# Patient Record
Sex: MALE | Race: BLACK OR AFRICAN AMERICAN
[De-identification: names, ages, dates, MRNs, and addresses within clinical notes are randomized per-mention and may not be internally consistent; named-entity substitution may affect disease eponyms.]

---

## 2018-02-06 ENCOUNTER — HOSPITAL ENCOUNTER (INPATIENT)
Dept: HOSPITAL 72 - EMR | Age: 63
LOS: 5 days | Discharge: NURSING, REHAB OR LONG-TERM CARE | DRG: 190 | End: 2018-02-11
Payer: MEDICARE

## 2018-02-06 VITALS — WEIGHT: 169 LBS | HEIGHT: 74 IN | BODY MASS INDEX: 21.69 KG/M2

## 2018-02-06 VITALS — DIASTOLIC BLOOD PRESSURE: 98 MMHG | SYSTOLIC BLOOD PRESSURE: 146 MMHG

## 2018-02-06 VITALS — DIASTOLIC BLOOD PRESSURE: 79 MMHG | SYSTOLIC BLOOD PRESSURE: 138 MMHG

## 2018-02-06 VITALS — DIASTOLIC BLOOD PRESSURE: 82 MMHG | SYSTOLIC BLOOD PRESSURE: 136 MMHG

## 2018-02-06 DIAGNOSIS — J44.1: ICD-10-CM

## 2018-02-06 DIAGNOSIS — J18.9: ICD-10-CM

## 2018-02-06 DIAGNOSIS — F17.200: ICD-10-CM

## 2018-02-06 DIAGNOSIS — J44.0: Primary | ICD-10-CM

## 2018-02-06 DIAGNOSIS — Z23: ICD-10-CM

## 2018-02-06 DIAGNOSIS — E87.6: ICD-10-CM

## 2018-02-06 LAB
ADD MANUAL DIFF: NO
ALBUMIN SERPL-MCNC: 4 G/DL (ref 3.4–5)
ALBUMIN/GLOB SERPL: 0.9 {RATIO} (ref 1–2.7)
ALP SERPL-CCNC: 81 U/L (ref 46–116)
ALT SERPL-CCNC: 23 U/L (ref 12–78)
ANION GAP SERPL CALC-SCNC: 9 MMOL/L (ref 5–15)
AST SERPL-CCNC: 24 U/L (ref 15–37)
BASOPHILS NFR BLD AUTO: 0.7 % (ref 0–2)
BILIRUB SERPL-MCNC: 0.7 MG/DL (ref 0.2–1)
BUN SERPL-MCNC: 8 MG/DL (ref 7–18)
CALCIUM SERPL-MCNC: 9 MG/DL (ref 8.5–10.1)
CHLORIDE SERPL-SCNC: 101 MMOL/L (ref 98–107)
CK MB SERPL-MCNC: 1.6 NG/ML (ref 0–3.6)
CK SERPL-CCNC: 265 U/L (ref 26–308)
CO2 SERPL-SCNC: 29 MMOL/L (ref 21–32)
CREAT SERPL-MCNC: 0.9 MG/DL (ref 0.55–1.3)
EOSINOPHIL NFR BLD AUTO: 0.1 % (ref 0–3)
ERYTHROCYTE [DISTWIDTH] IN BLOOD BY AUTOMATED COUNT: 11.8 % (ref 11.6–14.8)
GLOBULIN SER-MCNC: 4.3 G/DL
HCT VFR BLD CALC: 46.3 % (ref 42–52)
HGB BLD-MCNC: 15.9 G/DL (ref 14.2–18)
LYMPHOCYTES NFR BLD AUTO: 9.5 % (ref 20–45)
MCV RBC AUTO: 84 FL (ref 80–99)
MONOCYTES NFR BLD AUTO: 8.8 % (ref 1–10)
NEUTROPHILS NFR BLD AUTO: 80.9 % (ref 45–75)
PLATELET # BLD: 152 K/UL (ref 150–450)
POTASSIUM SERPL-SCNC: 3.8 MMOL/L (ref 3.5–5.1)
RBC # BLD AUTO: 5.48 M/UL (ref 4.7–6.1)
SODIUM SERPL-SCNC: 139 MMOL/L (ref 136–145)
WBC # BLD AUTO: 11 K/UL (ref 4.8–10.8)

## 2018-02-06 PROCEDURE — 84100 ASSAY OF PHOSPHORUS: CPT

## 2018-02-06 PROCEDURE — 82550 ASSAY OF CK (CPK): CPT

## 2018-02-06 PROCEDURE — 99285 EMERGENCY DEPT VISIT HI MDM: CPT

## 2018-02-06 PROCEDURE — 71045 X-RAY EXAM CHEST 1 VIEW: CPT

## 2018-02-06 PROCEDURE — 90732 PPSV23 VACC 2 YRS+ SUBQ/IM: CPT

## 2018-02-06 PROCEDURE — 94664 DEMO&/EVAL PT USE INHALER: CPT

## 2018-02-06 PROCEDURE — 80048 BASIC METABOLIC PNL TOTAL CA: CPT

## 2018-02-06 PROCEDURE — 84484 ASSAY OF TROPONIN QUANT: CPT

## 2018-02-06 PROCEDURE — 80053 COMPREHEN METABOLIC PANEL: CPT

## 2018-02-06 PROCEDURE — 87070 CULTURE OTHR SPECIMN AEROBIC: CPT

## 2018-02-06 PROCEDURE — 36415 COLL VENOUS BLD VENIPUNCTURE: CPT

## 2018-02-06 PROCEDURE — 83735 ASSAY OF MAGNESIUM: CPT

## 2018-02-06 PROCEDURE — 87040 BLOOD CULTURE FOR BACTERIA: CPT

## 2018-02-06 PROCEDURE — 85025 COMPLETE CBC W/AUTO DIFF WBC: CPT

## 2018-02-06 PROCEDURE — 85007 BL SMEAR W/DIFF WBC COUNT: CPT

## 2018-02-06 PROCEDURE — 94640 AIRWAY INHALATION TREATMENT: CPT

## 2018-02-06 PROCEDURE — 93005 ELECTROCARDIOGRAM TRACING: CPT

## 2018-02-06 PROCEDURE — 87205 SMEAR GRAM STAIN: CPT

## 2018-02-06 PROCEDURE — 82553 CREATINE MB FRACTION: CPT

## 2018-02-06 PROCEDURE — 83880 ASSAY OF NATRIURETIC PEPTIDE: CPT

## 2018-02-06 RX ADMIN — THEOPHYLLINE ANHYDROUS SCH MG: 100 CAPSULE, EXTENDED RELEASE ORAL at 21:48

## 2018-02-06 RX ADMIN — HEPARIN SODIUM SCH UNITS: 5000 INJECTION INTRAVENOUS; SUBCUTANEOUS at 21:49

## 2018-02-06 RX ADMIN — METHYLPREDNISOLONE SODIUM SUCCINATE SCH MG: 125 INJECTION, POWDER, FOR SOLUTION INTRAMUSCULAR; INTRAVENOUS at 23:44

## 2018-02-06 RX ADMIN — DEXTROSE MONOHYDRATE SCH MLS/HR: 50 INJECTION, SOLUTION INTRAVENOUS at 23:37

## 2018-02-06 NOTE — CONSULTATION
History of Present Illness


General


Date patient seen:  Feb 6, 2018


Chief Complaint:  Upper Respiratory Illness


Referring physician:  Dr. Uriarte


Reason for Consultation:  Shortness of breath suspected COPD excacerbation





Present Illness


HPI


The patient is a 62-year-old man with pmhx COPD who presents 


to [facility] with chief complaint of increased shortness of breath

, 


coughing and congestion.  THe patient admits that he has run out of his inhaler


medicine and has not seen his primary care doctor for extended period of time. 


Patient states he used to receive breathing treatments at home but has since


not been able to get his medicine due to insurance reasons and cost. The patient


is being admitted for treatment and I was asked to consult from pulmonary point


of view.


Allergies:  


Coded Allergies:  


     No Known Allergies (Unverified , 2/6/18)





Medication History


Scheduled


Fluticasone/Salmeterol (Advair 250-50 Diskus), 1 PUFF INH EVERY 12 HOURS, (

Reported)


Nicotine 14MG Patch* (Nicoderm Cq 14MG*), 1 EACH TD DAILY, (Reported)


Quetiapine Fumarate* (Seroquel*), 100 MG ORAL QHS, (Reported)


Theophylline (Theodur*), 100 MG ORAL TWICE A DAY, (Reported)


Trazodone Hcl* (Desyrel*), 50 MG ORAL BEDTIME, (Reported)





Scheduled PRN


Cyclobenzaprine Hcl* (Flexeril*), 10 MG ORAL QHS PRN for For Pain, (Reported)


Ibuprofen (Ibuprofen*), 800 MG ORAL THREE TIMES A DAY PRN for For Pain, (

Reported)


Ipratropium/Albuterol Sulfate (Combivent Respimat Inhal [place]), 1 PUFF IH for 

Bronchospasm, (Reported)





Patient History


Healthcare decision maker





Resuscitation status





Advanced Directive on File








Past Medical/Surgical History


Past Medical/Surgical History:  


(1) Acute exacerbation of chronic obstructive pulmonary disease (COPD)


(2) Pneumonia


(3) COPD exacerbation





Review of Systems


Constitutional:  Reports: malaise, weakness


Respiratory:  Reports: cough, shortness of breath, stridor, wheezing





Physical Exam


General Appearance:  severe distress


Lines, tubes and drains:  peripheral


HEENT:  normocephalic, atraumatic, anicteric, PERRL


Neck:  non-tender, normal alignment, supple, normal inspection


Respiratory/Chest:  chest wall non-tender, decreased breath sounds, expiratory 

wheezing, inspiratory wheezing, pleural rub


Breasts:  no masses


Cardiovascular/Chest:  regular rhythm, no JVD, tachycardia


Abdomen:  normal bowel sounds, non tender, soft, no organomegaly, no mass


Genitourinary/Rectal:  normal genital exam, normal rectal exam


Extremities:  normal range of motion, non-tender, normal inspection, no calf 

tenderness


Skin Exam:  normal pigmentation, warm/dry


Neurologic:  CNs II-XII grossly normal, no motor/sensory deficits





Last 24 Hour Vital Signs








  Date Time  Temp Pulse Resp B/P (MAP) Pulse Ox O2 Delivery O2 Flow Rate FiO2


 


2/6/18 17:32  92 18  99 Nasal Cannula 2.0 28


 


2/6/18 17:07  92 21   Nasal Cannula 2.0 28


 


2/6/18 17:03  92 18  96 Nasal Cannula 2.0 28


 


2/6/18 16:17 99.3  24 136/82 93 Room Air  


 


2/6/18 16:17  82 24   Nasal Cannula 2.0 93


 


2/6/18 15:29 99.3 82 24 136/82 93 Room Air  











Laboratory Tests








Test


  2/6/18


17:00


 


White Blood Count


  11.0 K/UL


(4.8-10.8)  H


 


Red Blood Count


  5.48 M/UL


(4.70-6.10)


 


Hemoglobin


  15.9 G/DL


(14.2-18.0)


 


Hematocrit


  46.3 %


(42.0-52.0)


 


Mean Corpuscular Volume 84 FL (80-99)  


 


Mean Corpuscular Hemoglobin


  29.0 PG


(27.0-31.0)


 


Mean Corpuscular Hemoglobin


Concent 34.3 G/DL


(32.0-36.0)


 


Red Cell Distribution Width


  11.8 %


(11.6-14.8)


 


Platelet Count


  152 K/UL


(150-450)


 


Mean Platelet Volume


  8.1 FL


(6.5-10.1)


 


Neutrophils (%) (Auto)


  80.9 %


(45.0-75.0)  H


 


Lymphocytes (%) (Auto)


  9.5 %


(20.0-45.0)  L


 


Monocytes (%) (Auto)


  8.8 %


(1.0-10.0)


 


Eosinophils (%) (Auto)


  0.1 %


(0.0-3.0)


 


Basophils (%) (Auto)


  0.7 %


(0.0-2.0)


 


Sodium Level


  139 MMOL/L


(136-145)


 


Potassium Level


  3.8 MMOL/L


(3.5-5.1)


 


Chloride Level


  101 MMOL/L


()


 


Carbon Dioxide Level


  29 MMOL/L


(21-32)


 


Anion Gap


  9 mmol/L


(5-15)


 


Blood Urea Nitrogen


  8 mg/dL (7-18)


 


 


Creatinine


  0.9 MG/DL


(0.55-1.30)


 


Estimat Glomerular Filtration


Rate > 60 mL/min


(>60)


 


Glucose Level


  85 MG/DL


()


 


Calcium Level


  9.0 MG/DL


(8.5-10.1)


 


Total Bilirubin


  0.7 MG/DL


(0.2-1.0)


 


Aspartate Amino Transf


(AST/SGOT) 24 U/L (15-37)


 


 


Alanine Aminotransferase


(ALT/SGPT) 23 U/L (12-78)


 


 


Alkaline Phosphatase


  81 U/L


()


 


Total Creatine Kinase


  265 U/L


()


 


Creatine Kinase MB


  1.6 NG/ML


(0.0-3.6)


 


Creatine Kinase MB Relative


Index 0.6  


 


 


Troponin I


  0.000 ng/mL


(0.000-0.056)


 


Pro-B-Type Natriuretic Peptide


  422 pg/mL


(0-125)  H


 


Total Protein


  8.3 G/DL


(6.4-8.2)  H


 


Albumin


  4.0 G/DL


(3.4-5.0)


 


Globulin 4.3 g/dL  


 


Albumin/Globulin Ratio


  0.9 (1.0-2.7)


L








Height (Feet):  6


Height (Inches):  2.00


Weight (Pounds):  169


Medications





Current Medications








 Medications


  (Trade)  Dose


 Ordered  Sig/Zana


 Route


 PRN Reason  Start Time


 Stop Time Status Last Admin


Dose Admin


 


 Levofloxacin  150 ml @ 


 100 mls/hr  NOW  ONCE


 IVPB


   2/6/18 18:15


 2/6/18 19:44  2/6/18 18:15


 











Assessment/Plan


Problem List:  


(1) Acute exacerbation of chronic obstructive pulmonary disease (COPD)


ICD Codes:  J44.1 - Chronic obstructive pulmonary disease with (acute) 

exacerbation


SNOMED:  901816373


Status:  stable, progressing


Assessment/Plan


Respiratory treatment


IV steroids


IV abx


Check cultures


DVT propylasix











NIALL PAT Feb 6, 2018 19:20

## 2018-02-06 NOTE — EMERGENCY ROOM REPORT
History of Present Illness


General


Chief Complaint:  Upper Respiratory Illness


Source:  Medical Record





Present Illness


HPI


Patient presents with complaints of cough and shortness of breath


Patient has history of COPD denies any vomiting or diarrhea


He feels that he had a cold earlier


And has not been worsening


Questionable low-grade fever


Denies any chest pain


Denies any calf pain or swelling denies any pleurisy





Patient had tried breathing treatment at home however not fully resolving his 

discomfort


Allergies:  


Coded Allergies:  


     No Known Allergies (Unverified , 2/6/18)





Patient History


Past Medical History:  see triage record


Pertinent Family History:  none


Reviewed Nursing Documentation:  PMH: Agreed, PSxH: Agreed





Nursing Documentation-PMH


Hx COPD:  Yes


History Of Psychiatric Problem:  Yes - Schizophrenia





Review of Systems


All Other Systems:  negative except mentioned in HPI





Physical Exam





Vital Signs








  Date Time  Temp Pulse Resp B/P (MAP) Pulse Ox O2 Delivery O2 Flow Rate FiO2


 


2/6/18 15:29 99.3 82 24 136/82 93 Room Air  


 


2/6/18 16:17       2.0 93








Sp02 EP Interpretation:  reviewed, normal


General Appearance:  mild distress - Appears short of breath


Head:  normocephalic, atraumatic


Eyes:  bilateral eye PERRL, bilateral eye EOMI


ENT:  hearing grossly normal, normal pharynx, TMs + canals normal, uvula midline


Neck:  full range of motion, supple, no meningismus, no bony tend


Respiratory:  no retraction, no accessory muscle use, wheezing - Patient has 

wheezing and crackles diffusely appears mildly tachypneic,


Cardiovascular #1:  normal peripheral pulses, regular rate, rhythm, no edema, 

no gallop, no JVD, no murmur


Gastrointestinal:  normal bowel sounds, non tender, soft, no mass, no 

organomegaly, non-distended, no guarding, no hernia, no pulsatile mass, no 

rebound


Genitourinary:  no CVA tenderness


Musculoskeletal:  normal inspection


Neurologic:  oriented x3, responsive, CNs III-XII nml as tested, motor strength/

tone normal, sensory intact


Psychiatric:  mood/affect normal


Skin:  normal color, no rash, warm/dry, palpation normal


Lymphatic:  normal inspection, no adenopathy





Medical Decision Making


Diagnostic Impression:  


 Primary Impression:  


 Pneumonia


 Additional Impression:  


 COPD exacerbation


ER Course


Patient is a fairly complex patient with multiple differential to consideration 

including but not limited to cardiac cardiopulmonary and vascular emergencies





Patient's x-ray shows some increased perihilar fullness consideration for 

pneumonia is also made





Patient COPD is addressed with reading treatments and steroids patient has done 

better however requiring repeat evaluation and further inpatient care





Labs








Test


  2/6/18


17:00


 


White Blood Count


  11.0 K/UL


(4.8-10.8)


 


Red Blood Count


  5.48 M/UL


(4.70-6.10)


 


Hemoglobin


  15.9 G/DL


(14.2-18.0)


 


Hematocrit


  46.3 %


(42.0-52.0)


 


Mean Corpuscular Volume 84 FL (80-99) 


 


Mean Corpuscular Hemoglobin


  29.0 PG


(27.0-31.0)


 


Mean Corpuscular Hemoglobin


Concent 34.3 G/DL


(32.0-36.0)


 


Red Cell Distribution Width


  11.8 %


(11.6-14.8)


 


Platelet Count


  152 K/UL


(150-450)


 


Mean Platelet Volume


  8.1 FL


(6.5-10.1)


 


Neutrophils (%) (Auto)


  80.9 %


(45.0-75.0)


 


Lymphocytes (%) (Auto)


  9.5 %


(20.0-45.0)


 


Monocytes (%) (Auto)


  8.8 %


(1.0-10.0)


 


Eosinophils (%) (Auto)


  0.1 %


(0.0-3.0)


 


Basophils (%) (Auto)


  0.7 %


(0.0-2.0)


 


Sodium Level


  139 MMOL/L


(136-145)


 


Potassium Level


  3.8 MMOL/L


(3.5-5.1)


 


Chloride Level


  101 MMOL/L


()


 


Carbon Dioxide Level


  29 MMOL/L


(21-32)


 


Anion Gap


  9 mmol/L


(5-15)


 


Blood Urea Nitrogen 8 mg/dL (7-18) 


 


Creatinine


  0.9 MG/DL


(0.55-1.30)


 


Estimat Glomerular Filtration


Rate > 60 mL/min


(>60)


 


Glucose Level


  85 MG/DL


()


 


Calcium Level


  9.0 MG/DL


(8.5-10.1)


 


Total Bilirubin


  0.7 MG/DL


(0.2-1.0)


 


Aspartate Amino Transf


(AST/SGOT) 24 U/L (15-37) 


 


 


Alanine Aminotransferase


(ALT/SGPT) 23 U/L (12-78) 


 


 


Alkaline Phosphatase


  81 U/L


()


 


Total Creatine Kinase


  265 U/L


()


 


Creatine Kinase MB


  1.6 NG/ML


(0.0-3.6)


 


Creatine Kinase MB Relative


Index 0.6 


 


 


Troponin I


  0.000 ng/mL


(0.000-0.056)


 


Pro-B-Type Natriuretic Peptide


  422 pg/mL


(0-125)


 


Total Protein


  8.3 G/DL


(6.4-8.2)


 


Albumin


  4.0 G/DL


(3.4-5.0)


 


Globulin 4.3 g/dL 


 


Albumin/Globulin Ratio 0.9 (1.0-2.7) 








Rhythm Strip Diag. Results


EP Interpretation:  yes


Rate:  77


Rhythm:  NSR, no PVC's, no ectopy





Chest X-Ray Diagnostic Results


Chest X-Ray Diagnostic Results :  


   Chest X-Ray Ordered:  Yes


   # of Views/Limited/Complete:  1 View


   Indication:  Shortness of Breath


   EP Interpretation:  Yes


   Interpretation:  no effusion, no pneumothorax, other - Mild increased 

perihilar fullness


   Impression:  Other - Parahilar fullness consider pneumonia


   Electronically Signed by:  Max Cortes DO





Last Vital Signs








  Date Time  Temp Pulse Resp B/P (MAP) Pulse Ox O2 Delivery O2 Flow Rate FiO2


 


2/6/18 17:32  92 18  99 Nasal Cannula 2.0 28


 


2/6/18 16:17 99.3   136/82    








Status:  improved


Disposition:  ADMITTED AS INPATIENT


Condition:  Serious


Referrals:  


NON PHYSICIAN (PCP)











MAX CORTES D.O. Feb 6, 2018 20:11

## 2018-02-07 VITALS — DIASTOLIC BLOOD PRESSURE: 90 MMHG | SYSTOLIC BLOOD PRESSURE: 140 MMHG

## 2018-02-07 VITALS — SYSTOLIC BLOOD PRESSURE: 156 MMHG | DIASTOLIC BLOOD PRESSURE: 93 MMHG

## 2018-02-07 VITALS — SYSTOLIC BLOOD PRESSURE: 123 MMHG | DIASTOLIC BLOOD PRESSURE: 74 MMHG

## 2018-02-07 VITALS — DIASTOLIC BLOOD PRESSURE: 80 MMHG | SYSTOLIC BLOOD PRESSURE: 137 MMHG

## 2018-02-07 VITALS — SYSTOLIC BLOOD PRESSURE: 112 MMHG | DIASTOLIC BLOOD PRESSURE: 78 MMHG

## 2018-02-07 VITALS — SYSTOLIC BLOOD PRESSURE: 143 MMHG | DIASTOLIC BLOOD PRESSURE: 86 MMHG

## 2018-02-07 RX ADMIN — METHYLPREDNISOLONE SODIUM SUCCINATE SCH MG: 125 INJECTION, POWDER, FOR SOLUTION INTRAMUSCULAR; INTRAVENOUS at 17:20

## 2018-02-07 RX ADMIN — DEXTROSE MONOHYDRATE SCH MLS/HR: 50 INJECTION, SOLUTION INTRAVENOUS at 05:10

## 2018-02-07 RX ADMIN — THEOPHYLLINE ANHYDROUS SCH MG: 100 CAPSULE, EXTENDED RELEASE ORAL at 08:49

## 2018-02-07 RX ADMIN — HEPARIN SODIUM SCH UNITS: 5000 INJECTION INTRAVENOUS; SUBCUTANEOUS at 08:49

## 2018-02-07 RX ADMIN — DEXTROSE MONOHYDRATE SCH MLS/HR: 50 INJECTION, SOLUTION INTRAVENOUS at 13:12

## 2018-02-07 RX ADMIN — METHYLPREDNISOLONE SODIUM SUCCINATE SCH MG: 125 INJECTION, POWDER, FOR SOLUTION INTRAMUSCULAR; INTRAVENOUS at 11:36

## 2018-02-07 RX ADMIN — THEOPHYLLINE ANHYDROUS SCH MG: 100 CAPSULE, EXTENDED RELEASE ORAL at 20:41

## 2018-02-07 RX ADMIN — DEXTROSE MONOHYDRATE SCH MLS/HR: 50 INJECTION, SOLUTION INTRAVENOUS at 20:42

## 2018-02-07 RX ADMIN — METHYLPREDNISOLONE SODIUM SUCCINATE SCH MG: 125 INJECTION, POWDER, FOR SOLUTION INTRAMUSCULAR; INTRAVENOUS at 05:10

## 2018-02-07 RX ADMIN — METHYLPREDNISOLONE SODIUM SUCCINATE SCH MG: 125 INJECTION, POWDER, FOR SOLUTION INTRAMUSCULAR; INTRAVENOUS at 22:59

## 2018-02-07 RX ADMIN — HEPARIN SODIUM SCH UNITS: 5000 INJECTION INTRAVENOUS; SUBCUTANEOUS at 20:43

## 2018-02-07 NOTE — DIAGNOSTIC IMAGING REPORT
Indication: Chest pain

 

Technique: One view of the chest

 

Comparison: none

 

Findings: The lungs are hyperinflated. There is some central bronchial wall

thickening and prominent reticular interstitial markings at the lung bases. No acute

infiltrates, effusions, or congestion. Normal heart size. Retrocardiac lucency could

represent a hiatal hernia

 

Impression: COPD

 

No acute process

 

Possible hiatal hernia

## 2018-02-07 NOTE — PULMONOLOGY PROGRESS NOTE
Assessment/Plan


Problems:  


(1) Acute exacerbation of chronic obstructive pulmonary disease (COPD)


Assessment/Plan


respiratory treatment


iv abx


iv steroids


check cultures





Subjective


ROS Limited/Unobtainable:  No


Interval Events:  feeling better


Allergies:  


Coded Allergies:  


     No Known Allergies (Unverified , 2/6/18)





Objective





Last 24 Hour Vital Signs








  Date Time  Temp Pulse Resp B/P (MAP) Pulse Ox O2 Delivery O2 Flow Rate FiO2


 


2/7/18 12:01 97.6 71 21 143/86 94 Room Air  


 


2/7/18 08:15 97.9 78 21 137/80 95 Room Air  


 


2/7/18 04:00 98.2 83 21 123/74 98   


 


2/7/18 04:00      Nasal Cannula 2.0 


 


2/7/18 00:00      Nasal Cannula 2.0 


 


2/7/18 00:00 98.1 79 21 112/78 94   


 


2/6/18 20:34 99.3 92 18 138/79 99 Nasal Cannula 2.0 28


 


2/6/18 20:31 99.3 92 18 138/79 99 Nasal Cannula 2.0 28


 


2/6/18 20:15 99.1 91 21 146/98 97   


 


2/6/18 17:32  92 18  99 Nasal Cannula 2.0 28


 


2/6/18 17:07  92 21   Nasal Cannula 2.0 28


 


2/6/18 17:03  92 18  96 Nasal Cannula 2.0 28


 


2/6/18 16:17 99.3  24 136/82 93 Room Air  


 


2/6/18 16:17  82 24   Nasal Cannula 2.0 93

















Intake and Output  


 


 2/6/18 2/7/18





 19:00 07:00


 


Intake Total  137.5 ml


 


Balance  137.5 ml


 


  


 


IV Total  137.5 ml


 


# Voids  3


 


# Bowel Movements  2








Objective


Head:  normocephalic, atraumatic


Eyes:  bilateral eye PERRL, bilateral eye EOMI


ENT:  hearing grossly normal, normal pharynx, TMs + canals normal, uvula midline


Neck:  full range of motion, supple, no meningismus, no bony tend


Respiratory:  no retraction, no accessory muscle use, wheezing - Patient has 

wheezing and crackles diffusely appears mildly tachypneic,


Cardiovascular #1:  normal peripheral pulses, regular rate, rhythm, no edema, 

no gallop, no JVD, no murmur


Gastrointestinal:  normal bowel sounds, non tender, soft, no mass, no 

organomegaly, non-distended, no guarding, no hernia, no pulsatile mass, no 

rebound


Genitourinary:  no CVA tenderness


Musculoskeletal:  normal inspection


Neurologic:  oriented x3, responsive, CNs III-XII nml as tested, motor strength/

tone normal, sensory intact


Psychiatric:  mood/affect normal


Laboratory Tests


2/6/18 17:00: 


White Blood Count 11.0H, Red Blood Count 5.48, Hemoglobin 15.9, Hematocrit 46.3

, Mean Corpuscular Volume 84, Mean Corpuscular Hemoglobin 29.0, Mean 

Corpuscular Hemoglobin Concent 34.3, Red Cell Distribution Width 11.8, Platelet 

Count 152, Mean Platelet Volume 8.1, Neutrophils (%) (Auto) 80.9H, Lymphocytes (

%) (Auto) 9.5L, Monocytes (%) (Auto) 8.8, Eosinophils (%) (Auto) 0.1, Basophils 

(%) (Auto) 0.7, Sodium Level 139, Potassium Level 3.8, Chloride Level 101, 

Carbon Dioxide Level 29, Anion Gap 9, Blood Urea Nitrogen 8, Creatinine 0.9, 

Estimat Glomerular Filtration Rate > 60, Glucose Level 85, Calcium Level 9.0, 

Total Bilirubin 0.7, Aspartate Amino Transf (AST/SGOT) 24, Alanine 

Aminotransferase (ALT/SGPT) 23, Alkaline Phosphatase 81, Total Creatine Kinase 

265, Creatine Kinase MB 1.6, Creatine Kinase MB Relative Index 0.6, Troponin I 

0.000, Pro-B-Type Natriuretic Peptide 422H, Total Protein 8.3H, Albumin 4.0, 

Globulin 4.3, Albumin/Globulin Ratio 0.9L





Current Medications








 Medications


  (Trade)  Dose


 Ordered  Sig/Zana


 Route


 PRN Reason  Start Time


 Stop Time Status Last Admin


Dose Admin


 


 Albuterol/


 Ipratropium


  (Albuterol/


 Ipratropium)  3 ml  Q4H  PRN


 HHN


 dyspnea  2/6/18 19:30


 2/11/18 19:29   


 


 


 Dextrose


  (Dextrose 50%)    STAT  PRN


 IV


 Hypoglycemia  2/6/18 19:30


 3/8/18 19:29   


 


 


 Heparin Sodium


  (Porcine)


  (Heparin 5000


 units/ml)  5,000 units  EVERY 12  HOURS


 SUBQ


   2/6/18 21:00


 3/8/18 20:59  2/6/18 21:49


 


 


 Ketorolac


 Tromethamine


  (Toradol 30mg)  30 mg  Q8H  PRN


 IV


 moderate pain 4-6  2/6/18 19:30


 2/11/18 19:29   


 


 


 Lorazepam


  (Ativan 2mg/ml


 1ml)  0.5 mg  Q4H  PRN


 IV


 For Anxiety  2/6/18 19:30


 2/13/18 19:29   


 


 


 Methylprednisolone


 Sodium Succinate


  (Solu-MEDROL)  60 mg  EVERY 6  HOURS


 IV


   2/7/18 00:00


 3/9/18 00:00  2/7/18 11:36


 


 


 Morphine Sulfate


  (Morphine


 Sulfate)  2 mg  Q4H  PRN


 IVP


 severe pain 7-10  2/6/18 19:30


 2/13/18 19:29   


 


 


 Nitroglycerin


  (Ntg)  0.4 mg  Q5M X 3 DOSES PRN


 SL


 Prn Chest Pain  2/6/18 19:30


 3/8/18 19:29   


 


 


 Ondansetron HCl


  (Zofran)  4 mg  Q6H  PRN


 IVP


 Nausea & Vomiting  2/6/18 19:30


 3/8/18 19:29   


 


 


 Piperacillin Sod/


 Tazobactam Sod


 3.375 gm/Sodium


 Chloride  110 ml @ 


 27.5 mls/hr  Q8HR


 IVPB


   2/6/18 22:00


 2/13/18 21:59  2/7/18 13:12


 


 


 Promethazine HCl/


 Codeine


  (Phenergan with


 Codeine)  5 ml  Q6H  PRN


 ORAL


 cough  2/6/18 19:30


 3/8/18 19:29   


 


 


 Temazepam


  (Restoril)  15 mg  HSPRN  PRN


 ORAL


 Insomnia  2/6/18 19:30


 2/13/18 19:29  2/6/18 21:48


 


 


 Theophylline


  (Julien-Dur)  100 mg  EVERY 12  HOURS


 ORAL


   2/6/18 21:00


 3/8/18 20:59  2/7/18 08:49


 

















NIALL PAT Feb 7, 2018 15:36

## 2018-02-07 NOTE — HISTORY AND PHYSICAL REPORT
DATE OF ADMISSION:  02/06/2018



TIME:  12 noon.



CONSULTANTS:  Toan Norman M.D.



CHIEF COMPLAINT:  Shortness of breath and exacerbation of chronic

obstructive pulmonary disease.



BRIEF HISTORY:  The patient is a 62-year-old male, who lives at home,

presents with one week of increased shortness of breath, coughing and

congested.  The patient was getting worse.  The patient came to [facility]

last night, diagnosed with exacerbation of COPD and admitted to medical

floor for further treatment.  Currently, slight short of breath, in bed.

No complaint.



PAST MEDICAL HISTORY:  COPD.



PAST SURGICAL HISTORY:  Lung wedge resection for _____.



MEDICATIONS:  Methylprednisolone, Zosyn, theophylline, albuterol,

Ketoralac, lorazepam, morphine, nitroglycerin, Zofran, Phenergan with

codeine, and temazepam.



ALLERGIES:  Denies.



SOCIAL HISTORY:  No smoke.  Positive alcohol.  No intravenous drug abuse.

 



FAMILY HISTORY:  Noncontributory.



REVIEW OF SYSTEMS:  No chest pain.  Slight short of breath.  No nausea,

vomiting, or diarrhea.



PHYSICAL EXAMINATION:

GENERAL:  Calm in bed, oriented x3, no acute distress.

VITAL SIGNS:  Temperature is 97 degrees, pulse 71, respirations 21, and

blood pressure 143/86.

CARDIOVASCULAR:  No murmur.

LUNGS:  Poor air exchange.

ABDOMEN:  Bowel sound positive.  Nontender.  Nondistended.

EXTREMITIES:  No cyanosis, clubbing or edema.

NEUROLOGIC:  The patient moves all extremities slightly weak.



LABORATORY AND DIAGNOSTIC DATA:  White count 11.8, otherwise CBC is normal

and BMP is normal.  Troponin 0.00.  BNP is 422.



ASSESSMENT:  Exacerbation of chronic obstructive pulmonary disease.



PLAN:  Continue premedications.  Antibiotics per Infectious Disease.  Taper

off steroids.  O2 and pulmonary treatment.  CBC and BMP in the morning.

We will continue to follow this patient medically.









  ______________________________________________

  Juan M Uriarte D.O.





DR:  ARIAN

D:  02/07/2018 12:30

T:  02/07/2018 18:43

JOB#:  [id number]

CC:

## 2018-02-07 NOTE — CARDIOLOGY REPORT
--------------- APPROVED REPORT --------------





EKG Measurement

Heart [id number]

ND [id number]

[id number]

[id number]

[id number]





Normal sinus rhythm

Possible Left atrial enlargement

Borderline ECG

## 2018-02-08 VITALS — SYSTOLIC BLOOD PRESSURE: 135 MMHG | DIASTOLIC BLOOD PRESSURE: 92 MMHG

## 2018-02-08 VITALS — DIASTOLIC BLOOD PRESSURE: 89 MMHG | SYSTOLIC BLOOD PRESSURE: 138 MMHG

## 2018-02-08 VITALS — DIASTOLIC BLOOD PRESSURE: 82 MMHG | SYSTOLIC BLOOD PRESSURE: 130 MMHG

## 2018-02-08 VITALS — DIASTOLIC BLOOD PRESSURE: 90 MMHG | SYSTOLIC BLOOD PRESSURE: 140 MMHG

## 2018-02-08 VITALS — DIASTOLIC BLOOD PRESSURE: 90 MMHG | SYSTOLIC BLOOD PRESSURE: 152 MMHG

## 2018-02-08 LAB
ADD MANUAL DIFF: YES
ANION GAP SERPL CALC-SCNC: 8 MMOL/L (ref 5–15)
BUN SERPL-MCNC: 10 MG/DL (ref 7–18)
CALCIUM SERPL-MCNC: 8.8 MG/DL (ref 8.5–10.1)
CHLORIDE SERPL-SCNC: 103 MMOL/L (ref 98–107)
CO2 SERPL-SCNC: 25 MMOL/L (ref 21–32)
CREAT SERPL-MCNC: 0.9 MG/DL (ref 0.55–1.3)
ERYTHROCYTE [DISTWIDTH] IN BLOOD BY AUTOMATED COUNT: 11.6 % (ref 11.6–14.8)
HCT VFR BLD CALC: 41.7 % (ref 42–52)
HGB BLD-MCNC: 15.1 G/DL (ref 14.2–18)
MCV RBC AUTO: 83 FL (ref 80–99)
PLATELET # BLD: 209 K/UL (ref 150–450)
POTASSIUM SERPL-SCNC: 3.8 MMOL/L (ref 3.5–5.1)
RBC # BLD AUTO: 5.01 M/UL (ref 4.7–6.1)
SODIUM SERPL-SCNC: 136 MMOL/L (ref 136–145)
WBC # BLD AUTO: 20 K/UL (ref 4.8–10.8)

## 2018-02-08 RX ADMIN — HEPARIN SODIUM SCH UNITS: 5000 INJECTION INTRAVENOUS; SUBCUTANEOUS at 08:19

## 2018-02-08 RX ADMIN — THEOPHYLLINE ANHYDROUS SCH MG: 100 CAPSULE, EXTENDED RELEASE ORAL at 08:18

## 2018-02-08 RX ADMIN — METHYLPREDNISOLONE SODIUM SUCCINATE SCH MG: 125 INJECTION, POWDER, FOR SOLUTION INTRAMUSCULAR; INTRAVENOUS at 05:42

## 2018-02-08 RX ADMIN — METHYLPREDNISOLONE SODIUM SUCCINATE SCH MG: 125 INJECTION, POWDER, FOR SOLUTION INTRAMUSCULAR; INTRAVENOUS at 17:52

## 2018-02-08 RX ADMIN — DEXTROSE MONOHYDRATE SCH MLS/HR: 50 INJECTION, SOLUTION INTRAVENOUS at 05:42

## 2018-02-08 RX ADMIN — THEOPHYLLINE ANHYDROUS SCH MG: 100 CAPSULE, EXTENDED RELEASE ORAL at 21:27

## 2018-02-08 RX ADMIN — DEXTROSE MONOHYDRATE SCH MLS/HR: 50 INJECTION, SOLUTION INTRAVENOUS at 21:27

## 2018-02-08 RX ADMIN — METHYLPREDNISOLONE SODIUM SUCCINATE SCH MG: 125 INJECTION, POWDER, FOR SOLUTION INTRAMUSCULAR; INTRAVENOUS at 12:00

## 2018-02-08 RX ADMIN — HEPARIN SODIUM SCH UNITS: 5000 INJECTION INTRAVENOUS; SUBCUTANEOUS at 21:30

## 2018-02-08 RX ADMIN — DEXTROSE MONOHYDRATE SCH MLS/HR: 50 INJECTION, SOLUTION INTRAVENOUS at 14:29

## 2018-02-08 NOTE — PULMONOLOGY PROGRESS NOTE
Assessment/Plan


Problems:  


(1) Acute exacerbation of chronic obstructive pulmonary disease (COPD)


Assessment/Plan


respiratory treatment


iv abx


iv steroids


check cultures


taper steroids to QD





Subjective


ROS Limited/Unobtainable:  No


Interval Events:  feeling better


Allergies:  


Coded Allergies:  


     No Known Allergies (Unverified , 2/6/18)





Objective





Last 24 Hour Vital Signs








  Date Time  Temp Pulse Resp B/P (MAP) Pulse Ox O2 Delivery O2 Flow Rate FiO2


 


2/8/18 16:54 97.3 111 20 152/90 95   


 


2/8/18 12:00 97.0 87 18 130/82 96   


 


2/8/18 08:00 97.8 84 19  93   


 


2/8/18 04:00 97.7 87 20 135/92 94   


 


2/8/18 00:00 97.9 89 21 138/89 95   


 


2/7/18 20:00 98.2 69 21 140/90 93   

















Intake and Output  


 


 2/7/18 2/8/18





 19:00 07:00


 


Intake Total 1152.5 ml 137.5 ml


 


Balance 1152.5 ml 137.5 ml


 


  


 


Intake Oral 960 ml 


 


IV Total 192.5 ml 137.5 ml


 


# Voids 2 1


 


# Bowel Movements 1 








Objective


Head:  normocephalic, atraumatic


Eyes:  bilateral eye PERRL, bilateral eye EOMI


ENT:  hearing grossly normal, normal pharynx, TMs + canals normal, uvula midline


Neck:  full range of motion, supple, no meningismus, no bony tend


Respiratory:  no retraction, no accessory muscle use, wheezing - Patient has 

wheezing and crackles diffusely appears mildly tachypneic,


Cardiovascular #1:  normal peripheral pulses, regular rate, rhythm, no edema, 

no gallop, no JVD, no murmur


Gastrointestinal:  normal bowel sounds, non tender, soft, no mass, no 

organomegaly, non-distended, no guarding, no hernia, no pulsatile mass, no 

rebound


Genitourinary:  no CVA tenderness


Musculoskeletal:  normal inspection


Neurologic:  oriented x3, responsive, CNs III-XII nml as tested, motor strength/

tone normal, sensory intact


Psychiatric:  mood/affect normal





Microbiology








 Date/Time


Source Procedure


Growth Status


 


 


 2/6/18 17:00


Blood Blood Culture - Preliminary


NO GROWTH AFTER 24 HOURS Resulted


 


 2/6/18 17:00


Blood Blood Culture - Preliminary


NO GROWTH AFTER 24 HOURS Resulted








Laboratory Tests


2/8/18 06:35: 


White Blood Count 20.0H, Red Blood Count 5.01, Hemoglobin 15.1, Hematocrit 41.7L

, Mean Corpuscular Volume 83, Mean Corpuscular Hemoglobin 30.2, Mean 

Corpuscular Hemoglobin Concent 36.2H, Red Cell Distribution Width 11.6, 

Platelet Count 209, Mean Platelet Volume 8.8, Neutrophils (%) (Auto) , 

Lymphocytes (%) (Auto) , Monocytes (%) (Auto) , Eosinophils (%) (Auto) , 

Basophils (%) (Auto) , Differential Total Cells Counted 100, Neutrophils % (

Manual) 86H, Lymphocytes % (Manual) 10L, Monocytes % (Manual) 3, Eosinophils % (

Manual) 0, Basophils % (Manual) 0, Band Neutrophils 1, Platelet Estimate 

Adequate, Platelet Morphology Normal, Red Blood Cell Morphology Normal, Sodium 

Level 136, Potassium Level 3.8, Chloride Level 103, Carbon Dioxide Level 25, 

Anion Gap 8, Blood Urea Nitrogen 10, Creatinine 0.9, Estimat Glomerular 

Filtration Rate > 60, Glucose Level 151H, Calcium Level 8.8





Current Medications








 Medications


  (Trade)  Dose


 Ordered  Sig/Zana


 Route


 PRN Reason  Start Time


 Stop Time Status Last Admin


Dose Admin


 


 Albuterol/


 Ipratropium


  (Albuterol/


 Ipratropium)  3 ml  Q4H  PRN


 HHN


 dyspnea  2/6/18 19:30


 2/11/18 19:29   


 


 


 Dextrose


  (Dextrose 50%)    STAT  PRN


 IV


 Hypoglycemia  2/6/18 19:30


 3/8/18 19:29   


 


 


 Heparin Sodium


  (Porcine)


  (Heparin 5000


 units/ml)  5,000 units  EVERY 12  HOURS


 SUBQ


   2/6/18 21:00


 3/8/18 20:59  2/8/18 08:19


 


 


 Ketorolac


 Tromethamine


  (Toradol 30mg)  30 mg  Q8H  PRN


 IV


 moderate pain 4-6  2/6/18 19:30


 2/11/18 19:29   


 


 


 Lorazepam


  (Ativan 2mg/ml


 1ml)  0.5 mg  Q4H  PRN


 IV


 For Anxiety  2/6/18 19:30


 2/13/18 19:29   


 


 


 Methylprednisolone


 Sodium Succinate


  (Solu-MEDROL)  60 mg  EVERY 6  HOURS


 IV


   2/7/18 00:00


 3/9/18 00:00  2/8/18 17:52


 


 


 Morphine Sulfate


  (Morphine


 Sulfate)  2 mg  Q4H  PRN


 IVP


 severe pain 7-10  2/6/18 19:30


 2/13/18 19:29   


 


 


 Nitroglycerin


  (Ntg)  0.4 mg  Q5M X 3 DOSES PRN


 SL


 Prn Chest Pain  2/6/18 19:30


 3/8/18 19:29   


 


 


 Ondansetron HCl


  (Zofran)  4 mg  Q6H  PRN


 IVP


 Nausea & Vomiting  2/6/18 19:30


 3/8/18 19:29   


 


 


 Piperacillin Sod/


 Tazobactam Sod


 3.375 gm/Sodium


 Chloride  110 ml @ 


 27.5 mls/hr  Q8HR


 IVPB


   2/6/18 22:00


 2/13/18 21:59  2/8/18 14:29


 


 


 Promethazine HCl/


 Codeine


  (Phenergan with


 Codeine)  5 ml  Q6H  PRN


 ORAL


 cough  2/6/18 19:30


 3/8/18 19:29   


 


 


 Temazepam


  (Restoril)  15 mg  HSPRN  PRN


 ORAL


 Insomnia  2/6/18 19:30


 2/13/18 19:29  2/7/18 22:59


 


 


 Theophylline


  (Julien-Dur)  100 mg  EVERY 12  HOURS


 ORAL


   2/6/18 21:00


 3/8/18 20:59  2/8/18 08:18


 

















NIALL PAT Feb 8, 2018 18:12

## 2018-02-08 NOTE — GENERAL PROGRESS NOTE
Assessment/Plan


Problem List:  


(1) Acute exacerbation of chronic obstructive pulmonary disease (COPD)


ICD Codes:  J44.1 - Chronic obstructive pulmonary disease with (acute) 

exacerbation


SNOMED:  784659722


(2) Pneumonia


ICD Codes:  J18.9 - Pneumonia, unspecified organism


SNOMED:  547795763


(3) COPD exacerbation


ICD Codes:  J44.1 - Chronic obstructive pulmonary disease with (acute) 

exacerbation


SNOMED:  022548352


Status:  stable, progressing, tolerating diet


Assessment/Plan


o2 pulm tx abx cbc bmp am





Subjective


Constitutional:  Reports: weakness


Respiratory:  Reports: shortness of breath


Allergies:  


Coded Allergies:  


     No Known Allergies (Unverified , 2/6/18)


All Systems:  reviewed and negative except above


Subjective


calm in bed





Objective





Last 24 Hour Vital Signs








  Date Time  Temp Pulse Resp B/P (MAP) Pulse Ox O2 Delivery O2 Flow Rate FiO2


 


2/8/18 12:00 97.0 87 18 130/82 96   


 


2/8/18 08:00 97.8 84 19  93   


 


2/8/18 04:00 97.7 87 20 135/92 94   


 


2/8/18 00:00 97.9 89 21 138/89 95   


 


2/7/18 20:00 98.2 69 21 140/90 93   


 


2/7/18 16:04 97.8 83 21 156/93 97 Room Air  

















Intake and Output  


 


 2/7/18 2/8/18





 19:00 07:00


 


Intake Total 1152.5 ml 137.5 ml


 


Balance 1152.5 ml 137.5 ml


 


  


 


Intake Oral 960 ml 


 


IV Total 192.5 ml 137.5 ml


 


# Voids 2 1


 


# Bowel Movements 1 








Laboratory Tests


2/8/18 06:35: 


White Blood Count 20.0H, Red Blood Count 5.01, Hemoglobin 15.1, Hematocrit 41.7L

, Mean Corpuscular Volume 83, Mean Corpuscular Hemoglobin 30.2, Mean 

Corpuscular Hemoglobin Concent 36.2H, Red Cell Distribution Width 11.6, 

Platelet Count 209, Mean Platelet Volume 8.8, Neutrophils (%) (Auto) , 

Lymphocytes (%) (Auto) , Monocytes (%) (Auto) , Eosinophils (%) (Auto) , 

Basophils (%) (Auto) , Differential Total Cells Counted 100, Neutrophils % (

Manual) 86H, Lymphocytes % (Manual) 10L, Monocytes % (Manual) 3, Eosinophils % (

Manual) 0, Basophils % (Manual) 0, Band Neutrophils 1, Platelet Estimate 

Adequate, Platelet Morphology Normal, Red Blood Cell Morphology Normal, Sodium 

Level 136, Potassium Level 3.8, Chloride Level 103, Carbon Dioxide Level 25, 

Anion Gap 8, Blood Urea Nitrogen 10, Creatinine 0.9, Estimat Glomerular 

Filtration Rate > 60, Glucose Level 151H, Calcium Level 8.8


Height (Feet):  6


Height (Inches):  2.00


Weight (Pounds):  169


General Appearance:  lethargic


EENT:  normal ENT inspection


Neck:  normal alignment


Cardiovascular:  normal peripheral pulses, normal rate, regular rhythm


Respiratory/Chest:  decreased breath sounds


Abdomen:  normal bowel sounds, non tender, soft


Extremities:  normal inspection


Edema:  no edema noted Arm (L), no edema noted Arm (R), no edema noted Leg (L), 

no edema noted Leg (R), no edema noted Pedal (L), no edema noted Pedal (R), no 

edema noted Generalized


Neurologic:  responsive, motor weakness


Skin:  normal pigmentation, warm/dry











JENNIFER CALLAHAN Feb 8, 2018 13:42

## 2018-02-09 VITALS — SYSTOLIC BLOOD PRESSURE: 129 MMHG | DIASTOLIC BLOOD PRESSURE: 76 MMHG

## 2018-02-09 VITALS — DIASTOLIC BLOOD PRESSURE: 69 MMHG | SYSTOLIC BLOOD PRESSURE: 124 MMHG

## 2018-02-09 VITALS — DIASTOLIC BLOOD PRESSURE: 78 MMHG | SYSTOLIC BLOOD PRESSURE: 133 MMHG

## 2018-02-09 VITALS — SYSTOLIC BLOOD PRESSURE: 119 MMHG | DIASTOLIC BLOOD PRESSURE: 74 MMHG

## 2018-02-09 VITALS — DIASTOLIC BLOOD PRESSURE: 104 MMHG | SYSTOLIC BLOOD PRESSURE: 150 MMHG

## 2018-02-09 VITALS — SYSTOLIC BLOOD PRESSURE: 138 MMHG | DIASTOLIC BLOOD PRESSURE: 88 MMHG

## 2018-02-09 LAB
ADD MANUAL DIFF: YES
ANION GAP SERPL CALC-SCNC: 8 MMOL/L (ref 5–15)
BUN SERPL-MCNC: 14 MG/DL (ref 7–18)
CALCIUM SERPL-MCNC: 8.8 MG/DL (ref 8.5–10.1)
CHLORIDE SERPL-SCNC: 102 MMOL/L (ref 98–107)
CO2 SERPL-SCNC: 26 MMOL/L (ref 21–32)
CREAT SERPL-MCNC: 0.9 MG/DL (ref 0.55–1.3)
ERYTHROCYTE [DISTWIDTH] IN BLOOD BY AUTOMATED COUNT: 11.9 % (ref 11.6–14.8)
HCT VFR BLD CALC: 43.2 % (ref 42–52)
HGB BLD-MCNC: 15 G/DL (ref 14.2–18)
MCV RBC AUTO: 84 FL (ref 80–99)
PLATELET # BLD: 241 K/UL (ref 150–450)
POTASSIUM SERPL-SCNC: 3.7 MMOL/L (ref 3.5–5.1)
RBC # BLD AUTO: 5.16 M/UL (ref 4.7–6.1)
SODIUM SERPL-SCNC: 136 MMOL/L (ref 136–145)
WBC # BLD AUTO: 19.2 K/UL (ref 4.8–10.8)

## 2018-02-09 RX ADMIN — IPRATROPIUM BROMIDE AND ALBUTEROL SULFATE PRN ML: .5; 3 SOLUTION RESPIRATORY (INHALATION) at 20:08

## 2018-02-09 RX ADMIN — HEPARIN SODIUM SCH UNITS: 5000 INJECTION INTRAVENOUS; SUBCUTANEOUS at 21:14

## 2018-02-09 RX ADMIN — HEPARIN SODIUM SCH UNITS: 5000 INJECTION INTRAVENOUS; SUBCUTANEOUS at 08:33

## 2018-02-09 RX ADMIN — DEXTROSE MONOHYDRATE SCH MLS/HR: 50 INJECTION, SOLUTION INTRAVENOUS at 21:13

## 2018-02-09 RX ADMIN — IPRATROPIUM BROMIDE AND ALBUTEROL SULFATE PRN ML: .5; 3 SOLUTION RESPIRATORY (INHALATION) at 00:40

## 2018-02-09 RX ADMIN — THEOPHYLLINE ANHYDROUS SCH MG: 100 CAPSULE, EXTENDED RELEASE ORAL at 21:13

## 2018-02-09 RX ADMIN — THEOPHYLLINE ANHYDROUS SCH MG: 100 CAPSULE, EXTENDED RELEASE ORAL at 08:31

## 2018-02-09 RX ADMIN — DEXTROSE MONOHYDRATE SCH MLS/HR: 50 INJECTION, SOLUTION INTRAVENOUS at 14:35

## 2018-02-09 RX ADMIN — DEXTROSE MONOHYDRATE SCH MLS/HR: 50 INJECTION, SOLUTION INTRAVENOUS at 05:36

## 2018-02-09 NOTE — PULMONOLOGY PROGRESS NOTE
Assessment/Plan


Problems:  


(1) Acute exacerbation of chronic obstructive pulmonary disease (COPD)


Assessment/Plan


respiratory treatment


iv abx, on zosyn


iv steroids, decrease to 30


check cultures


tirate fio2


check cultures





Subjective


ROS Limited/Unobtainable:  No


Constitutional:  Reports: no symptoms


HEENT:  Repors: no symptoms


Respiratory:  Reports: no symptoms


Allergies:  


Coded Allergies:  


     No Known Allergies (Unverified , 2/6/18)





Objective





Last 24 Hour Vital Signs








  Date Time  Temp Pulse Resp B/P (MAP) Pulse Ox O2 Delivery O2 Flow Rate FiO2


 


2/9/18 20:19  83 20  98 Room Air  21


 


2/9/18 20:09  78 20  94 Room Air  21


 


2/9/18 20:09  78 18   Room Air  21


 


2/9/18 20:00 97.7 78 20 129/76 95   


 


2/9/18 16:14 97.6 60 23 150/104 99 Room Air  


 


2/9/18 12:15 97.5 65 18 133/78  Room Air  


 


2/9/18 08:15 97.0 71 17 119/74 97 Room Air  


 


2/9/18 04:00 98.1 62 18 124/69 93   


 


2/9/18 00:49  62 18  97 Room Air 2.0 21


 


2/9/18 00:42  60 20  95 Room Air  21


 


2/9/18 00:00 97.5 54 19 138/88 93 Room Air  

















Intake and Output  


 


 2/8/18 2/9/18





 19:00 07:00


 


Intake Total 1330.0 ml 110.0 ml


 


Balance 1330.0 ml 110.0 ml


 


  


 


Intake Oral 1220 ml 


 


IV Total 110.0 ml 110.0 ml


 


# Voids 8 3


 


# Bowel Movements  1








Objective


Head:  normocephalic, atraumatic


Eyes:  bilateral eye PERRL, bilateral eye EOMI


ENT:  hearing grossly normal, normal pharynx, TMs + canals normal, uvula midline


Neck:  full range of motion, supple, no meningismus, no bony tend


Respiratory:  no retraction, no accessory muscle use, wheezing - Patient has 

wheezing and crackles diffusely appears mildly tachypneic,


Cardiovascular #1:  normal peripheral pulses, regular rate, rhythm, no edema, 

no gallop, no JVD, no murmur


Gastrointestinal:  normal bowel sounds, non tender, soft, no mass, no 

organomegaly, non-distended, no guarding, no hernia, no pulsatile mass, no 

rebound


Genitourinary:  no CVA tenderness


Musculoskeletal:  normal inspection


Neurologic:  oriented x3, responsive, CNs III-XII nml as tested, motor strength/

tone normal, sensory intact


Psychiatric:  mood/affect normal


Laboratory Tests


2/9/18 07:25: 


White Blood Count 19.2H, Red Blood Count 5.16, Hemoglobin 15.0, Hematocrit 43.2

, Mean Corpuscular Volume 84, Mean Corpuscular Hemoglobin 29.1, Mean 

Corpuscular Hemoglobin Concent 34.8, Red Cell Distribution Width 11.9, Platelet 

Count 241, Mean Platelet Volume 8.1, Neutrophils (%) (Auto) , Lymphocytes (%) (

Auto) , Monocytes (%) (Auto) , Eosinophils (%) (Auto) , Basophils (%) (Auto) , 

Differential Total Cells Counted 100, Neutrophils % (Manual) 84H, Lymphocytes % 

(Manual) 6L, Monocytes % (Manual) 10, Eosinophils % (Manual) 0, Basophils % (

Manual) 0, Band Neutrophils 0, Platelet Estimate Adequate, Platelet Morphology 

Normal, Red Blood Cell Morphology Normal, Sodium Level 136, Potassium Level 3.7

, Chloride Level 102, Carbon Dioxide Level 26, Anion Gap 8, Blood Urea Nitrogen 

14, Creatinine 0.9, Estimat Glomerular Filtration Rate > 60, Glucose Level 105, 

Calcium Level 8.8





Current Medications








 Medications


  (Trade)  Dose


 Ordered  Sig/Zana


 Route


 PRN Reason  Start Time


 Stop Time Status Last Admin


Dose Admin


 


 Albuterol/


 Ipratropium


  (Albuterol/


 Ipratropium)  3 ml  Q4H  PRN


 HHN


 dyspnea  2/6/18 19:30


 2/11/18 19:29  2/9/18 20:08


 


 


 Dextrose


  (Dextrose 50%)    STAT  PRN


 IV


 Hypoglycemia  2/6/18 19:30


 3/8/18 19:29   


 


 


 Heparin Sodium


  (Porcine)


  (Heparin 5000


 units/ml)  5,000 units  EVERY 12  HOURS


 SUBQ


   2/6/18 21:00


 3/8/18 20:59  2/9/18 21:14


 


 


 Ketorolac


 Tromethamine


  (Toradol 30mg)  30 mg  Q8H  PRN


 IV


 moderate pain 4-6  2/6/18 19:30


 2/11/18 19:29   


 


 


 Lorazepam


  (Ativan 2mg/ml


 1ml)  0.5 mg  Q4H  PRN


 IV


 For Anxiety  2/6/18 19:30


 2/13/18 19:29   


 


 


 Methylprednisolone


 Sodium Succinate


  (Solu-MEDROL)  60 mg  DAILY


 IV


   2/9/18 09:00


 3/9/18 00:00  2/9/18 08:31


 


 


 Morphine Sulfate


  (Morphine


 Sulfate)  2 mg  Q4H  PRN


 IVP


 severe pain 7-10  2/6/18 19:30


 2/13/18 19:29   


 


 


 Nitroglycerin


  (Ntg)  0.4 mg  Q5M X 3 DOSES PRN


 SL


 Prn Chest Pain  2/6/18 19:30


 3/8/18 19:29   


 


 


 Ondansetron HCl


  (Zofran)  4 mg  Q6H  PRN


 IVP


 Nausea & Vomiting  2/6/18 19:30


 3/8/18 19:29   


 


 


 Piperacillin Sod/


 Tazobactam Sod


 3.375 gm/Sodium


 Chloride  110 ml @ 


 27.5 mls/hr  Q8HR


 IVPB


   2/6/18 22:00


 2/13/18 21:59  2/9/18 21:13


 


 


 Promethazine HCl/


 Codeine


  (Phenergan with


 Codeine)  5 ml  Q6H  PRN


 ORAL


 cough  2/6/18 19:30


 3/8/18 19:29   


 


 


 Temazepam


  (Restoril)  15 mg  HSPRN  PRN


 ORAL


 Insomnia  2/6/18 19:30


 2/13/18 19:29  2/9/18 21:56


 


 


 Theophylline


  (Julien-Dur)  100 mg  EVERY 12  HOURS


 ORAL


   2/6/18 21:00


 3/8/18 20:59  2/9/18 21:13


 

















NIALL PAT Feb 9, 2018 22:46

## 2018-02-09 NOTE — GENERAL PROGRESS NOTE
Assessment/Plan


Problem List:  


(1) Acute exacerbation of chronic obstructive pulmonary disease (COPD)


ICD Codes:  J44.1 - Chronic obstructive pulmonary disease with (acute) 

exacerbation


SNOMED:  167013174


(2) Pneumonia


ICD Codes:  J18.9 - Pneumonia, unspecified organism


SNOMED:  400488433


(3) COPD exacerbation


ICD Codes:  J44.1 - Chronic obstructive pulmonary disease with (acute) 

exacerbation


SNOMED:  915784403


Status:  stable, progressing, tolerating diet


Assessment/Plan


o2 pulm tx abx cbc bmp am dc w hh if clear





Subjective


Constitutional:  Reports: weakness


Respiratory:  Reports: shortness of breath


Allergies:  


Coded Allergies:  


     No Known Allergies (Unverified , 2/6/18)


All Systems:  reviewed and negative except above


Subjective


calm in bed





Objective





Last 24 Hour Vital Signs








  Date Time  Temp Pulse Resp B/P (MAP) Pulse Ox O2 Delivery O2 Flow Rate FiO2


 


2/9/18 08:15 97.0 71 17 119/74 97 Room Air  


 


2/9/18 04:00 98.1 62 18 124/69 93   


 


2/9/18 00:49  62 18  97 Room Air 2.0 21


 


2/9/18 00:42  60 20  95 Room Air  21


 


2/9/18 00:00 97.5 54 19 138/88 93 Room Air  


 


2/8/18 20:00 97.5 64 19 140/90 94 Room Air  


 


2/8/18 16:54 97.3 111 20 152/90 95   

















Intake and Output  


 


 2/8/18 2/9/18





 19:00 07:00


 


Intake Total 1330.0 ml 110.0 ml


 


Balance 1330.0 ml 110.0 ml


 


  


 


Intake Oral 1220 ml 


 


IV Total 110.0 ml 110.0 ml


 


# Voids 8 3


 


# Bowel Movements  1








Laboratory Tests


2/9/18 07:25: 


White Blood Count 19.2H, Red Blood Count 5.16, Hemoglobin 15.0, Hematocrit 43.2

, Mean Corpuscular Volume 84, Mean Corpuscular Hemoglobin 29.1, Mean 

Corpuscular Hemoglobin Concent 34.8, Red Cell Distribution Width 11.9, Platelet 

Count 241, Mean Platelet Volume 8.1, Neutrophils (%) (Auto) , Lymphocytes (%) (

Auto) , Monocytes (%) (Auto) , Eosinophils (%) (Auto) , Basophils (%) (Auto) , 

Differential Total Cells Counted 100, Neutrophils % (Manual) 84H, Lymphocytes % 

(Manual) 6L, Monocytes % (Manual) 10, Eosinophils % (Manual) 0, Basophils % (

Manual) 0, Band Neutrophils 0, Platelet Estimate Adequate, Platelet Morphology 

Normal, Red Blood Cell Morphology Normal, Sodium Level 136, Potassium Level 3.7

, Chloride Level 102, Carbon Dioxide Level 26, Anion Gap 8, Blood Urea Nitrogen 

14, Creatinine 0.9, Estimat Glomerular Filtration Rate > 60, Glucose Level 105, 

Calcium Level 8.8


Height (Feet):  6


Height (Inches):  2.00


Weight (Pounds):  169


General Appearance:  alert


EENT:  normal ENT inspection


Neck:  normal alignment


Cardiovascular:  normal peripheral pulses, normal rate, regular rhythm


Respiratory/Chest:  decreased breath sounds


Abdomen:  normal bowel sounds, non tender, soft


Extremities:  normal inspection


Edema:  no edema noted Arm (L), no edema noted Arm (R), no edema noted Leg (L), 

no edema noted Leg (R), no edema noted Pedal (L), no edema noted Pedal (R), no 

edema noted Generalized


Neurologic:  responsive, motor weakness


Skin:  normal pigmentation, warm/dry











JENNIFER CALLAHAN Feb 9, 2018 12:09

## 2018-02-10 VITALS — DIASTOLIC BLOOD PRESSURE: 70 MMHG | SYSTOLIC BLOOD PRESSURE: 124 MMHG

## 2018-02-10 VITALS — DIASTOLIC BLOOD PRESSURE: 80 MMHG | SYSTOLIC BLOOD PRESSURE: 139 MMHG

## 2018-02-10 VITALS — SYSTOLIC BLOOD PRESSURE: 143 MMHG | DIASTOLIC BLOOD PRESSURE: 81 MMHG

## 2018-02-10 VITALS — DIASTOLIC BLOOD PRESSURE: 86 MMHG | SYSTOLIC BLOOD PRESSURE: 136 MMHG

## 2018-02-10 VITALS — SYSTOLIC BLOOD PRESSURE: 150 MMHG | DIASTOLIC BLOOD PRESSURE: 95 MMHG

## 2018-02-10 VITALS — SYSTOLIC BLOOD PRESSURE: 136 MMHG | DIASTOLIC BLOOD PRESSURE: 86 MMHG

## 2018-02-10 VITALS — SYSTOLIC BLOOD PRESSURE: 134 MMHG | DIASTOLIC BLOOD PRESSURE: 86 MMHG

## 2018-02-10 LAB
ADD MANUAL DIFF: NO
ANION GAP SERPL CALC-SCNC: 5 MMOL/L (ref 5–15)
BASOPHILS NFR BLD AUTO: 0.7 % (ref 0–2)
BUN SERPL-MCNC: 15 MG/DL (ref 7–18)
CALCIUM SERPL-MCNC: 8.3 MG/DL (ref 8.5–10.1)
CHLORIDE SERPL-SCNC: 102 MMOL/L (ref 98–107)
CO2 SERPL-SCNC: 30 MMOL/L (ref 21–32)
CREAT SERPL-MCNC: 1 MG/DL (ref 0.55–1.3)
EOSINOPHIL NFR BLD AUTO: 0.1 % (ref 0–3)
ERYTHROCYTE [DISTWIDTH] IN BLOOD BY AUTOMATED COUNT: 11.7 % (ref 11.6–14.8)
HCT VFR BLD CALC: 42.6 % (ref 42–52)
HGB BLD-MCNC: 14.4 G/DL (ref 14.2–18)
LYMPHOCYTES NFR BLD AUTO: 18.8 % (ref 20–45)
MCV RBC AUTO: 85 FL (ref 80–99)
MONOCYTES NFR BLD AUTO: 9.5 % (ref 1–10)
NEUTROPHILS NFR BLD AUTO: 71 % (ref 45–75)
PLATELET # BLD: 212 K/UL (ref 150–450)
POTASSIUM SERPL-SCNC: 3.2 MMOL/L (ref 3.5–5.1)
RBC # BLD AUTO: 5.04 M/UL (ref 4.7–6.1)
SODIUM SERPL-SCNC: 137 MMOL/L (ref 136–145)
WBC # BLD AUTO: 11.7 K/UL (ref 4.8–10.8)

## 2018-02-10 RX ADMIN — HEPARIN SODIUM SCH UNITS: 5000 INJECTION INTRAVENOUS; SUBCUTANEOUS at 20:40

## 2018-02-10 RX ADMIN — DEXTROSE MONOHYDRATE SCH MLS/HR: 50 INJECTION, SOLUTION INTRAVENOUS at 22:16

## 2018-02-10 RX ADMIN — HEPARIN SODIUM SCH UNITS: 5000 INJECTION INTRAVENOUS; SUBCUTANEOUS at 09:38

## 2018-02-10 RX ADMIN — IPRATROPIUM BROMIDE AND ALBUTEROL SULFATE PRN ML: .5; 3 SOLUTION RESPIRATORY (INHALATION) at 06:43

## 2018-02-10 RX ADMIN — THEOPHYLLINE ANHYDROUS SCH MG: 100 CAPSULE, EXTENDED RELEASE ORAL at 20:38

## 2018-02-10 RX ADMIN — DEXTROSE MONOHYDRATE SCH MLS/HR: 50 INJECTION, SOLUTION INTRAVENOUS at 05:54

## 2018-02-10 RX ADMIN — DEXTROSE MONOHYDRATE SCH MLS/HR: 50 INJECTION, SOLUTION INTRAVENOUS at 13:54

## 2018-02-10 RX ADMIN — THEOPHYLLINE ANHYDROUS SCH MG: 100 CAPSULE, EXTENDED RELEASE ORAL at 09:35

## 2018-02-10 NOTE — PULMONOLOGY PROGRESS NOTE
Assessment/Plan


Problems:  


(1) Acute exacerbation of chronic obstructive pulmonary disease (COPD)


Assessment/Plan


respiratory treatment


iv abx, on zosyn


dc steroids





check cultures


tirate fio2


check cultures


dc planning





Subjective


ROS Limited/Unobtainable:  No


Interval Events:  feeling better


Allergies:  


Coded Allergies:  


     No Known Allergies (Unverified , 2/6/18)





Objective





Last 24 Hour Vital Signs








  Date Time  Temp Pulse Resp B/P (MAP) Pulse Ox O2 Delivery O2 Flow Rate FiO2


 


2/10/18 08:15 97.5 90 20 124/70  Room Air  


 


2/10/18 06:51  58 20  97 Room Air  21


 


2/10/18 06:46  54 18   Room Air  21


 


2/10/18 06:45  54 18  96 Room Air  21


 


2/10/18 04:00      Room Air  


 


2/10/18 04:00 97.2 57 20 143/81 96   


 


2/10/18 00:00      Room Air  


 


2/10/18 00:00 97.3 58 20 139/80 95   


 


2/9/18 20:19  83 20  98 Room Air  21


 


2/9/18 20:09  78 20  94 Room Air  21


 


2/9/18 20:09  78 18   Room Air  21


 


2/9/18 20:00 97.7 78 20 129/76 95   


 


2/9/18 20:00      Room Air  


 


2/9/18 16:14 97.6 60 23 150/104 99 Room Air  


 


2/9/18 12:15 97.5 65 18 133/78  Room Air  

















Intake and Output  


 


 2/9/18 2/10/18





 19:00 07:00


 


Intake Total 672.5 ml 110.0 ml


 


Balance 672.5 ml 110.0 ml


 


  


 


Intake Oral 480 ml 


 


IV Total 192.5 ml 110.0 ml


 


# Voids 1 3








Objective


Head:  normocephalic, atraumatic


Eyes:  bilateral eye PERRL, bilateral eye EOMI


ENT:  hearing grossly normal, normal pharynx, TMs + canals normal, uvula midline


Neck:  full range of motion, supple, no meningismus, no bony tend


Respiratory:  no retraction, no accessory muscle use, wheezing - Patient has 

wheezing and crackles diffusely appears mildly tachypneic,


Cardiovascular #1:  normal peripheral pulses, regular rate, rhythm, no edema, 

no gallop, no JVD, no murmur


Gastrointestinal:  normal bowel sounds, non tender, soft, no mass, no 

organomegaly, non-distended, no guarding, no hernia, no pulsatile mass, no 

rebound


Genitourinary:  no CVA tenderness


Musculoskeletal:  normal inspection


Neurologic:  oriented x3, responsive, CNs III-XII nml as tested, motor strength/

tone normal, sensory intact


Psychiatric:  mood/affect normal





Microbiology








 Date/Time


Source Procedure


Growth Status


 


 


 2/9/18 03:30


Sputum Gram Stain - Final Resulted


 


 2/9/18 03:30


Sputum Sputum Culture - Preliminary


NO GROWTH Resulted








Laboratory Tests


2/10/18 06:55: 


White Blood Count 11.7H, Red Blood Count 5.04, Hemoglobin 14.4, Hematocrit 42.6

, Mean Corpuscular Volume 85, Mean Corpuscular Hemoglobin 28.5, Mean 

Corpuscular Hemoglobin Concent 33.8, Red Cell Distribution Width 11.7, Platelet 

Count 212, Mean Platelet Volume 8.1, Neutrophils (%) (Auto) 71.0, Lymphocytes (%

) (Auto) 18.8L, Monocytes (%) (Auto) 9.5, Eosinophils (%) (Auto) 0.1, Basophils 

(%) (Auto) 0.7, Sodium Level 137, Potassium Level 3.2L, Chloride Level 102, 

Carbon Dioxide Level 30, Anion Gap 5, Blood Urea Nitrogen 15, Creatinine 1.0, 

Estimat Glomerular Filtration Rate > 60, Glucose Level 87, Calcium Level 8.3L





Current Medications








 Medications


  (Trade)  Dose


 Ordered  Sig/Zana


 Route


 PRN Reason  Start Time


 Stop Time Status Last Admin


Dose Admin


 


 Albuterol/


 Ipratropium


  (Albuterol/


 Ipratropium)  3 ml  Q4H  PRN


 HHN


 dyspnea  2/6/18 19:30


 2/11/18 19:29  2/10/18 06:43


 


 


 Dextrose


  (Dextrose 50%)    STAT  PRN


 IV


 Hypoglycemia  2/6/18 19:30


 3/8/18 19:29   


 


 


 Heparin Sodium


  (Porcine)


  (Heparin 5000


 units/ml)  5,000 units  EVERY 12  HOURS


 SUBQ


   2/6/18 21:00


 3/8/18 20:59  2/10/18 09:38


 


 


 Ketorolac


 Tromethamine


  (Toradol 30mg)  30 mg  Q8H  PRN


 IV


 moderate pain 4-6  2/6/18 19:30


 2/11/18 19:29   


 


 


 Lorazepam


  (Ativan 2mg/ml


 1ml)  0.5 mg  Q4H  PRN


 IV


 For Anxiety  2/6/18 19:30


 2/13/18 19:29   


 


 


 Methylprednisolone


 Sodium Succinate


  (Solu-MEDROL)  30 mg  DAILY


 IVP


   2/10/18 09:00


 3/12/18 08:59  2/10/18 09:35


 


 


 Morphine Sulfate


  (Morphine


 Sulfate)  2 mg  Q4H  PRN


 IVP


 severe pain 7-10  2/6/18 19:30


 2/13/18 19:29   


 


 


 Nitroglycerin


  (Ntg)  0.4 mg  Q5M X 3 DOSES PRN


 SL


 Prn Chest Pain  2/6/18 19:30


 3/8/18 19:29   


 


 


 Ondansetron HCl


  (Zofran)  4 mg  Q6H  PRN


 IVP


 Nausea & Vomiting  2/6/18 19:30


 3/8/18 19:29   


 


 


 Piperacillin Sod/


 Tazobactam Sod


 3.375 gm/Sodium


 Chloride  110 ml @ 


 27.5 mls/hr  Q8HR


 IVPB


   2/6/18 22:00


 2/13/18 21:59  2/10/18 05:54


 


 


 Potassium Chloride


  (K-Dur)  40 meq  ONCE  ONCE


 ORAL


   2/10/18 12:00


 2/10/18 12:01   


 


 


 Promethazine HCl/


 Codeine


  (Phenergan with


 Codeine)  5 ml  Q6H  PRN


 ORAL


 cough  2/6/18 19:30


 3/8/18 19:29  2/10/18 06:36


 


 


 Temazepam


  (Restoril)  15 mg  HSPRN  PRN


 ORAL


 Insomnia  2/6/18 19:30


 2/13/18 19:29  2/9/18 21:56


 


 


 Theophylline


  (Julien-Dur)  100 mg  EVERY 12  HOURS


 ORAL


   2/6/18 21:00


 3/8/18 20:59  2/10/18 09:35


 

















NIALL PAT Feb 10, 2018 11:56

## 2018-02-10 NOTE — GENERAL PROGRESS NOTE
Assessment/Plan


Problem List:  


(1) Acute exacerbation of chronic obstructive pulmonary disease (COPD)


ICD Codes:  J44.1 - Chronic obstructive pulmonary disease with (acute) 

exacerbation


SNOMED:  938030907


(2) Pneumonia


ICD Codes:  J18.9 - Pneumonia, unspecified organism


SNOMED:  444515945


(3) COPD exacerbation


ICD Codes:  J44.1 - Chronic obstructive pulmonary disease with (acute) 

exacerbation


SNOMED:  437794206


Status:  stable, progressing, tolerating diet


Assessment/Plan


o2 pulm tx abx cbc bmp am dc w hh if clear





Subjective


Constitutional:  Reports: weakness


Allergies:  


Coded Allergies:  


     No Known Allergies (Unverified , 2/6/18)


All Systems:  reviewed and negative except above


Subjective


calm in bed





Objective





Last 24 Hour Vital Signs








  Date Time  Temp Pulse Resp B/P (MAP) Pulse Ox O2 Delivery O2 Flow Rate FiO2


 


2/10/18 08:15 97.5 90 20 124/70  Room Air  


 


2/10/18 06:51  58 20  97 Room Air  21


 


2/10/18 06:46  54 18   Room Air  21


 


2/10/18 06:45  54 18  96 Room Air  21


 


2/10/18 04:00      Room Air  


 


2/10/18 04:00 97.2 57 20 143/81 96   


 


2/10/18 00:00      Room Air  


 


2/10/18 00:00 97.3 58 20 139/80 95   


 


2/9/18 20:19  83 20  98 Room Air  21


 


2/9/18 20:09  78 20  94 Room Air  21


 


2/9/18 20:09  78 18   Room Air  21


 


2/9/18 20:00 97.7 78 20 129/76 95   


 


2/9/18 20:00      Room Air  


 


2/9/18 16:14 97.6 60 23 150/104 99 Room Air  


 


2/9/18 12:15 97.5 65 18 133/78  Room Air  

















Intake and Output  


 


 2/9/18 2/10/18





 19:00 07:00


 


Intake Total 672.5 ml 110.0 ml


 


Balance 672.5 ml 110.0 ml


 


  


 


Intake Oral 480 ml 


 


IV Total 192.5 ml 110.0 ml


 


# Voids 1 3








Laboratory Tests


2/10/18 06:55: 


White Blood Count 11.7H, Red Blood Count 5.04, Hemoglobin 14.4, Hematocrit 42.6

, Mean Corpuscular Volume 85, Mean Corpuscular Hemoglobin 28.5, Mean 

Corpuscular Hemoglobin Concent 33.8, Red Cell Distribution Width 11.7, Platelet 

Count 212, Mean Platelet Volume 8.1, Neutrophils (%) (Auto) 71.0, Lymphocytes (%

) (Auto) 18.8L, Monocytes (%) (Auto) 9.5, Eosinophils (%) (Auto) 0.1, Basophils 

(%) (Auto) 0.7, Sodium Level [Pending], Potassium Level [Pending], Chloride 

Level [Pending], Carbon Dioxide Level [Pending], Blood Urea Nitrogen [Pending], 

Creatinine [Pending], Estimat Glomerular Filtration Rate [Pending], Glucose 

Level [Pending], Calcium Level [Pending]


Height (Feet):  6


Height (Inches):  2.00


Weight (Pounds):  169


General Appearance:  alert


EENT:  normal ENT inspection


Neck:  normal alignment


Cardiovascular:  normal peripheral pulses, normal rate, regular rhythm


Respiratory/Chest:  chest wall non-tender, lungs clear, normal breath sounds


Abdomen:  normal bowel sounds, non tender, soft


Extremities:  normal inspection


Edema:  no edema noted Arm (L), no edema noted Arm (R), no edema noted Leg (L), 

no edema noted Leg (R), no edema noted Pedal (L), no edema noted Pedal (R), no 

edema noted Generalized


Neurologic:  responsive, motor weakness


Skin:  normal pigmentation, warm/dry











JENNIFER CALLAHAN Feb 10, 2018 08:40

## 2018-02-11 VITALS — DIASTOLIC BLOOD PRESSURE: 87 MMHG | SYSTOLIC BLOOD PRESSURE: 136 MMHG

## 2018-02-11 VITALS — SYSTOLIC BLOOD PRESSURE: 113 MMHG | DIASTOLIC BLOOD PRESSURE: 85 MMHG

## 2018-02-11 VITALS — DIASTOLIC BLOOD PRESSURE: 77 MMHG | SYSTOLIC BLOOD PRESSURE: 126 MMHG

## 2018-02-11 VITALS — SYSTOLIC BLOOD PRESSURE: 135 MMHG | DIASTOLIC BLOOD PRESSURE: 90 MMHG

## 2018-02-11 LAB
ADD MANUAL DIFF: NO
ALBUMIN SERPL-MCNC: 2.9 G/DL (ref 3.4–5)
ALBUMIN/GLOB SERPL: 0.8 {RATIO} (ref 1–2.7)
ALP SERPL-CCNC: 58 U/L (ref 46–116)
ALT SERPL-CCNC: 54 U/L (ref 12–78)
ANION GAP SERPL CALC-SCNC: 5 MMOL/L (ref 5–15)
AST SERPL-CCNC: 31 U/L (ref 15–37)
BASOPHILS NFR BLD AUTO: 1.1 % (ref 0–2)
BILIRUB SERPL-MCNC: 0.3 MG/DL (ref 0.2–1)
BUN SERPL-MCNC: 12 MG/DL (ref 7–18)
CALCIUM SERPL-MCNC: 8.6 MG/DL (ref 8.5–10.1)
CHLORIDE SERPL-SCNC: 102 MMOL/L (ref 98–107)
CO2 SERPL-SCNC: 31 MMOL/L (ref 21–32)
CREAT SERPL-MCNC: 0.9 MG/DL (ref 0.55–1.3)
EOSINOPHIL NFR BLD AUTO: 0.6 % (ref 0–3)
ERYTHROCYTE [DISTWIDTH] IN BLOOD BY AUTOMATED COUNT: 12 % (ref 11.6–14.8)
GLOBULIN SER-MCNC: 3.6 G/DL
HCT VFR BLD CALC: 47.2 % (ref 42–52)
HGB BLD-MCNC: 16.3 G/DL (ref 14.2–18)
LYMPHOCYTES NFR BLD AUTO: 25.6 % (ref 20–45)
MCV RBC AUTO: 85 FL (ref 80–99)
MONOCYTES NFR BLD AUTO: 8.5 % (ref 1–10)
NEUTROPHILS NFR BLD AUTO: 64.3 % (ref 45–75)
PHOSPHATE SERPL-MCNC: 3.6 MG/DL (ref 2.5–4.9)
PLATELET # BLD: 226 K/UL (ref 150–450)
POTASSIUM SERPL-SCNC: 3 MMOL/L (ref 3.5–5.1)
RBC # BLD AUTO: 5.57 M/UL (ref 4.7–6.1)
SODIUM SERPL-SCNC: 138 MMOL/L (ref 136–145)
WBC # BLD AUTO: 12.4 K/UL (ref 4.8–10.8)

## 2018-02-11 RX ADMIN — THEOPHYLLINE ANHYDROUS SCH MG: 100 CAPSULE, EXTENDED RELEASE ORAL at 08:20

## 2018-02-11 RX ADMIN — DEXTROSE MONOHYDRATE SCH MLS/HR: 50 INJECTION, SOLUTION INTRAVENOUS at 05:44

## 2018-02-11 RX ADMIN — HEPARIN SODIUM SCH UNITS: 5000 INJECTION INTRAVENOUS; SUBCUTANEOUS at 08:27

## 2018-02-11 NOTE — GENERAL PROGRESS NOTE
Assessment/Plan


Problem List:  


(1) Acute exacerbation of chronic obstructive pulmonary disease (COPD)


ICD Codes:  J44.1 - Chronic obstructive pulmonary disease with (acute) 

exacerbation


SNOMED:  707720798


(2) Pneumonia


ICD Codes:  J18.9 - Pneumonia, unspecified organism


SNOMED:  215375993


(3) COPD exacerbation


ICD Codes:  J44.1 - Chronic obstructive pulmonary disease with (acute) 

exacerbation


SNOMED:  466733094


Status:  stable, progressing, tolerating diet


Assessment/Plan


o2 pulm tx abx dc w hh if clear





Subjective


Constitutional:  Reports: weakness


Allergies:  


Coded Allergies:  


     No Known Allergies (Unverified , 2/6/18)


All Systems:  reviewed and negative except above


Subjective


calm in bed





Objective





Last 24 Hour Vital Signs








  Date Time  Temp Pulse Resp B/P (MAP) Pulse Ox O2 Delivery O2 Flow Rate FiO2


 


2/11/18 08:02 97.6 84 19 136/87 96   


 


2/11/18 07:02  71 18   Room Air  21


 


2/11/18 04:00 97.9 86 20 113/85 97   


 


2/11/18 00:00 97.2 81 21 126/77 94   


 


2/10/18 20:00 97.2 66 21 150/95 96   


 


2/10/18 19:15  67 18   Room Air  21


 


2/10/18 16:00 97.7 83 20 134/86  Room Air  


 


2/10/18 13:54 97.2 77 18 136/86  Room Air  


 


2/10/18 12:00 97.2 77  136/86  Room Air  

















Intake and Output  


 


 2/10/18 2/11/18





 19:00 07:00


 


Intake Total 1415.0 ml 350.0 ml


 


Balance 1415.0 ml 350.0 ml


 


  


 


Intake Oral 1250 ml 240 ml


 


IV Total 165.0 ml 110.0 ml


 


# Voids 2 1


 


# Bowel Movements 3 








Laboratory Tests


2/11/18 07:54: 


White Blood Count [Pending], Red Blood Count [Pending], Hemoglobin [Pending], 

Hematocrit [Pending], Mean Corpuscular Volume [Pending], Mean Corpuscular 

Hemoglobin [Pending], Mean Corpuscular Hemoglobin Concent [Pending], Red Cell 

Distribution Width [Pending], Platelet Count [Pending], Mean Platelet Volume [

Pending], Neutrophils (%) (Auto) [Pending], Lymphocytes (%) (Auto) [Pending], 

Monocytes (%) (Auto) [Pending], Eosinophils (%) (Auto) [Pending], Basophils (%) 

(Auto) [Pending], Sodium Level [Pending], Potassium Level [Pending], Chloride 

Level [Pending], Carbon Dioxide Level [Pending], Blood Urea Nitrogen [Pending], 

Creatinine [Pending], Estimat Glomerular Filtration Rate [Pending], Glucose 

Level [Pending], Calcium Level [Pending], Phosphorus Level [Pending], Magnesium 

Level [Pending], Total Bilirubin [Pending], Aspartate Amino Transf (AST/SGOT) [

Pending], Alanine Aminotransferase (ALT/SGPT) [Pending], Alkaline Phosphatase [

Pending], Total Protein [Pending], Albumin [Pending], Globulin [Pending]


Height (Feet):  6


Height (Inches):  2.00


Weight (Pounds):  169


General Appearance:  alert


EENT:  normal ENT inspection


Neck:  normal alignment


Cardiovascular:  normal peripheral pulses, normal rate, regular rhythm


Respiratory/Chest:  decreased breath sounds


Abdomen:  normal bowel sounds, non tender, soft


Extremities:  normal inspection


Edema:  no edema noted Arm (L), no edema noted Arm (R), no edema noted Leg (L), 

no edema noted Leg (R), no edema noted Pedal (L), no edema noted Pedal (R), no 

edema noted Generalized


Neurologic:  responsive, motor weakness


Skin:  normal pigmentation, warm/dry











JENNIFER CALLAHAN Feb 11, 2018 09:07

## 2018-02-11 NOTE — CONSULTATION
DATE OF CONSULTATION:  02/11/2018



INFECTIOUS DISEASES CONSULTATION



PRIMARY ATTENDING PHYSICIAN:  Juan M Uriarte D.O.



REASON FOR CONSULTATION:  COPD exacerbation.



HISTORY OF PRESENT ILLNESS:  A 62-year-old  male admitted

on 02/06/2018 complaining of cough and shortness of breath.  The patient

has a history of COPD.



PAST MEDICAL HISTORY:  Significant for chronic bronchitis.  The patient has

a history of right lung surgery.  He has history of nicotine dependence.



MEDICATIONS:  Zosyn, heparin, theophylline, albuterol, ipratropium inhaler,

Toradol, lorazepam, morphine, Phenergan With Codeine, and Restoril.



ALLERGIES:  No known drug allergy.



SOCIAL HISTORY:  He states he quit smoking two weeks ago, single, lives

alone.  He states that he has a brother, he got sick recently with

pneumonia.  He did have flu shot, but he did not have pneumococcal

vaccination and wants to have pneumococcal vaccination.



REVIEW OF SYSTEMS:  Currently, he is doing better.  He has no fever.  No

chills.  No coughing.  No shortness of breath.  No nausea.  No vomiting.

No diarrhea.  No problem passing urine.



PHYSICAL EXAMINATION:

VITAL SIGNS:  Temperature 97.6 degrees, pulse 84, blood pressure 136/87.

GENERAL APPEARANCE:  No acute distress.  Seems well developed.

HEAD AND NECK:  No oral lesion.

HEART:  S1 and S2 regular.

LUNGS:  Have few rhonchi with coarse sounds.

ABDOMEN:  Soft and nontender.

EXTREMITIES:  He has no edema.

NEUROLOGIC:  Awake, alert, and oriented x3.



LABORATORY AND DIAGNOSTIC DATA:  Blood cultures are negative

.  Sodium 138, potassium 3, chloride 102, bicarbonate 31, BUN 12,

creatinine 0.9, and glucose is 110.  WBC 20.4, hemoglobin 16.3, hematocrit

47.2, and platelets 226,000.  Chest x-ray showed no infiltrate.



IMPRESSION:

1. Chronic obstructive pulmonary disease exacerbation, improving.

2. Hypokalemia.

3. Nicotine dependence, recently quit.



RECOMMENDATIONS:  We will get pneumococcal vaccination.  Finish the five

days course of antibiotic.  I agreed with discharge to home.



At the end of my exam, I thank Dr. Juan M Uriarte for involving me in the

care of this patient.









  ______________________________________________

  Dariel Rahban, M.D.





DR:  Milagro

D:  02/11/2018 10:10

T:  02/11/2018 19:34

JOB#:  [id number]

CC:



JOEL

## 2018-02-11 NOTE — PULMONOLOGY PROGRESS NOTE
Assessment/Plan


Problems:  


(1) Acute exacerbation of chronic obstructive pulmonary disease (COPD)


Assessment/Plan


respiratory treatment


iv abx, on zosyn


dc steroids





check cultures


tirate fio2


check cultures


dc planning with oral theophyline


d/w dr Uriarte





Subjective


ROS Limited/Unobtainable:  No


Constitutional:  Reports: no symptoms


HEENT:  Repors: no symptoms


Respiratory:  Reports: no symptoms


Allergies:  


Coded Allergies:  


     No Known Allergies (Unverified , 2/6/18)





Objective





Last 24 Hour Vital Signs








  Date Time  Temp Pulse Resp B/P (MAP) Pulse Ox O2 Delivery O2 Flow Rate FiO2


 


2/11/18 08:02 97.6 84 19 136/87 96   


 


2/11/18 07:02  71 18   Room Air  21


 


2/11/18 04:00 97.9 86 20 113/85 97   


 


2/11/18 00:00 97.2 81 21 126/77 94   


 


2/10/18 20:00 97.2 66 21 150/95 96   


 


2/10/18 19:15  67 18   Room Air  21


 


2/10/18 16:00 97.7 83 20 134/86  Room Air  


 


2/10/18 13:54 97.2 77 18 136/86  Room Air  


 


2/10/18 12:00 97.2 77  136/86  Room Air  

















Intake and Output  


 


 2/10/18 2/11/18





 19:00 07:00


 


Intake Total 1415.0 ml 350.0 ml


 


Balance 1415.0 ml 350.0 ml


 


  


 


Intake Oral 1250 ml 240 ml


 


IV Total 165.0 ml 110.0 ml


 


# Voids 2 1


 


# Bowel Movements 3 








Objective


Head:  normocephalic, atraumatic


Eyes:  bilateral eye PERRL, bilateral eye EOMI


ENT:  hearing grossly normal, normal pharynx, TMs + canals normal, uvula midline


Neck:  full range of motion, supple, no meningismus, no bony tend


Respiratory:  no retraction, no accessory muscle use, wheezing - Patient has 

wheezing and crackles diffusely appears mildly tachypneic,


Cardiovascular #1:  normal peripheral pulses, regular rate, rhythm, no edema, 

no gallop, no JVD, no murmur


Gastrointestinal:  normal bowel sounds, non tender, soft, no mass, no 

organomegaly, non-distended, no guarding, no hernia, no pulsatile mass, no 

rebound


Genitourinary:  no CVA tenderness


Musculoskeletal:  normal inspection


Neurologic:  oriented x3, responsive, CNs III-XII nml as tested, motor strength/

tone normal, sensory intact


Psychiatric:  mood/affect normal





Microbiology








 Date/Time


Source Procedure


Growth Status


 


 


 2/9/18 03:30


Sputum Gram Stain - Final Resulted


 


 2/9/18 03:30 Sputum Culture - Preliminary


YEAST


Usual Upper Respiratory Chanel Resulted








Laboratory Tests


2/11/18 07:54: 


White Blood Count 12.4H, Red Blood Count 5.57, Hemoglobin 16.3, Hematocrit 47.2

, Mean Corpuscular Volume 85, Mean Corpuscular Hemoglobin 29.3, Mean 

Corpuscular Hemoglobin Concent 34.6, Red Cell Distribution Width 12.0, Platelet 

Count 226, Mean Platelet Volume 7.6, Neutrophils (%) (Auto) 64.3, Lymphocytes (%

) (Auto) 25.6, Monocytes (%) (Auto) 8.5, Eosinophils (%) (Auto) 0.6, Basophils (

%) (Auto) 1.1, Sodium Level 138, Potassium Level 3.0L, Chloride Level 102, 

Carbon Dioxide Level 31, Anion Gap 5, Blood Urea Nitrogen 12, Creatinine 0.9, 

Estimat Glomerular Filtration Rate > 60, Glucose Level 110H, Calcium Level 8.6, 

Phosphorus Level 3.6, Magnesium Level 1.8, Total Bilirubin 0.3, Aspartate Amino 

Transf (AST/SGOT) 31, Alanine Aminotransferase (ALT/SGPT) 54, Alkaline 

Phosphatase 58, Total Protein 6.5, Albumin 2.9L, Globulin 3.6, Albumin/Globulin 

Ratio 0.8L





Current Medications








 Medications


  (Trade)  Dose


 Ordered  Sig/Zana


 Route


 PRN Reason  Start Time


 Stop Time Status Last Admin


Dose Admin


 


 Albuterol/


 Ipratropium


  (Albuterol/


 Ipratropium)  3 ml  Q4H  PRN


 HHN


 dyspnea  2/6/18 19:30


 2/11/18 19:29  2/10/18 06:43


 


 


 Dextrose


  (Dextrose 50%)    STAT  PRN


 IV


 Hypoglycemia  2/6/18 19:30


 3/8/18 19:29   


 


 


 Heparin Sodium


  (Porcine)


  (Heparin 5000


 units/ml)  5,000 units  EVERY 12  HOURS


 SUBQ


   2/6/18 21:00


 3/8/18 20:59  2/11/18 08:27


 


 


 Ketorolac


 Tromethamine


  (Toradol 30mg)  30 mg  Q8H  PRN


 IV


 moderate pain 4-6  2/6/18 19:30


 2/11/18 19:29   


 


 


 Lorazepam


  (Ativan 2mg/ml


 1ml)  0.5 mg  Q4H  PRN


 IV


 For Anxiety  2/6/18 19:30


 2/13/18 19:29   


 


 


 Morphine Sulfate


  (Morphine


 Sulfate)  2 mg  Q4H  PRN


 IVP


 severe pain 7-10  2/6/18 19:30


 2/13/18 19:29   


 


 


 Nitroglycerin


  (Ntg)  0.4 mg  Q5M X 3 DOSES PRN


 SL


 Prn Chest Pain  2/6/18 19:30


 3/8/18 19:29   


 


 


 Ondansetron HCl


  (Zofran)  4 mg  Q6H  PRN


 IVP


 Nausea & Vomiting  2/6/18 19:30


 3/8/18 19:29   


 


 


 Piperacillin Sod/


 Tazobactam Sod


 3.375 gm/Sodium


 Chloride  110 ml @ 


 27.5 mls/hr  Q8HR


 IVPB


   2/6/18 22:00


 2/13/18 21:59  2/11/18 05:44


 


 


 Pneumococcal


 Polyvalent Vaccine


  (Pneumovax)  0.5 ml  ONCE ONCE


 IM


   2/11/18 10:15


 2/11/18 10:16 UNV  


 


 


 Promethazine HCl/


 Codeine


  (Phenergan with


 Codeine)  5 ml  Q6H  PRN


 ORAL


 cough  2/6/18 19:30


 3/8/18 19:29  2/10/18 06:36


 


 


 Temazepam


  (Restoril)  15 mg  HSPRN  PRN


 ORAL


 Insomnia  2/6/18 19:30


 2/13/18 19:29  2/10/18 20:38


 


 


 Theophylline


  (Julien-Dur)  100 mg  EVERY 12  HOURS


 ORAL


   2/6/18 21:00


 3/8/18 20:59  2/11/18 08:20


 

















NIALL PAT Feb 11, 2018 10:58

## 2018-02-13 NOTE — DISCHARGE SUMMARY 2 SIG
DATE OF ADMISSION:  02/06/2018



DATE OF DISCHARGE:  02/11/2018



REASON FOR ADMISSION:   62-year-old male with past medical

history of schizophrenia, chronic obstructive pulmonary disease,  

smoker, presented to emergency department with cough and shortness of

breath.  He was diagnosed with acute chronic obstructive pulmonary disease

exacerbation and pneumonia and was admitted for further management.



HOSPITAL COURSE:  The patient was admitted.  Pulmonology and ID consults

were requested.  Sputum culture was positive for Candida.  Blood cultures

were negative.  The patient was on empiric antibiotics.  The patient was

started on the IV steroids, which were gradually tapered down. 

Supplemental oxygen provided as needed to keep pulse oximetry above 92%.

Pulmonary toilet provided with bronchodilator.  Trial of theophylline started.  

The patient was on antitussive as needed.  DVT prophylaxis provided.  

Chest x-ray revealed evidence of chronic obstructive pulmonary disease.  

No apparent abnormalities.    Potassium was replaced,  prior to discharge.

Magnesium was stable.  The patient was counseled on smoking cessation.  

The patient was on nicotine patch, continue at the skilled nursing facility.   d

Respiratory status improved.  The patient was stable for discharge.



FINAL DIAGNOSES:

1. Acute chronic obstructive pulmonary disease exacerbation.

2. Possible pneumonia.

3. Nicotine dependency.

4. Hypokalemia.



DISCHARGE MEDICATIONS:  See medication reconciliation list.



DISCHARGE INSTRUCTIONS:  The patient was discharged to skilled nursing

facility.  Follow up with medical doctor at the facility.







  ______________________________________________

  Juan M Uriarte D.O.



I have been assigned to dictate discharge summary on this account and I

was not involved in the patient's management.



  ______________________________________________

  Yoana Marroquin (Vanchtein) NANUJA





DR:  QUANG

D:  02/13/2018 10:44

T:  02/13/2018 23:23

JOB#:  [id number]

CC:



JOEL

## 2018-02-13 NOTE — DISCHARGE SUMMARY
Discharge Summary


Hospital Course


Date of Admission


Feb 6, 2018 at 18:37


Date of Discharge


Feb 11, 2018 at 14:12


Admitting Diagnosis


pneumonia, copd exacerbation


HPI


Shon Treviño is a 62 year old male who was admitted on Feb 6, 2018 at 18:37 

for Pneumonia, Chronic Obstructive Pulmonary Disease


Hospital Course


dc summary [id number]





Discharge Medications


Continued Medications:  


Cyclobenzaprine Hcl* (Flexeril*) 10 Mg Tablet


10 MG ORAL QHS PRN for For Pain





Fluticasone/Salmeterol (Advair 250-50 Diskus) 1 Each Blst.w.dev


1 PUFF INH EVERY 12 HOURS





Ibuprofen (Ibuprofen*) 200 Mg Tablet


800 MG ORAL THREE TIMES A DAY PRN for For Pain





Ipratropium/Albuterol Sulfate (Combivent Respimat Inhal Spray) 4 Gm Mist.inhal


1 PUFF IH PRN for Bronchospasm





Nicotine 14MG Patch* (Nicoderm Cq 14MG*) 1 Each Patch.td24


1 EACH TD DAILY





Quetiapine Fumarate* (Seroquel*) 100 Mg Tablet


100 MG ORAL QHS





Theophylline (Theodur*) 100 Mg Tab.er.12h


100 MG ORAL TWICE A DAY, #30 TAB 0 Refills





Trazodone Hcl* (Desyrel*) 50 Mg Tablet


50 MG ORAL BEDTIME











Discharge


Condition Upon Discharge:  stable


Discharge Disposition


Patient was discharged to Home with  services


Discharge Diagnoses:  





Discharge Instructions


Discharge Instructions


Special Instructions


I have been assigned to complete a D/C Summary on this account. I was not 

involved in the patient management











Yoana Marroquin NP (Vanchtein) Feb 13, 2018 10:45

## 2018-03-12 ENCOUNTER — HOSPITAL ENCOUNTER (OUTPATIENT)
Dept: HOSPITAL 72 - RAD | Age: 63
Discharge: HOME | End: 2018-03-12
Payer: MEDICARE

## 2018-03-12 DIAGNOSIS — R05: Primary | ICD-10-CM

## 2018-03-12 PROCEDURE — 71046 X-RAY EXAM CHEST 2 VIEWS: CPT

## 2018-03-12 NOTE — DIAGNOSTIC IMAGING REPORT
Indication: Cough

 

Technique: 2 views of the chest

 

Comparison: February 6, 2018

 

Findings: The lungs are hyperinflated. Lungs and pleural spaces are clear. Heart size

is normal. The bones are unremarkable

 

Impression: Negative

## 2018-09-19 ENCOUNTER — HOSPITAL ENCOUNTER (EMERGENCY)
Dept: HOSPITAL 72 - EMR | Age: 63
Discharge: HOME | End: 2018-09-19
Payer: MEDICARE

## 2018-09-19 VITALS — WEIGHT: 190 LBS | HEIGHT: 75 IN | BODY MASS INDEX: 23.62 KG/M2

## 2018-09-19 VITALS — DIASTOLIC BLOOD PRESSURE: 91 MMHG | SYSTOLIC BLOOD PRESSURE: 140 MMHG

## 2018-09-19 VITALS — SYSTOLIC BLOOD PRESSURE: 130 MMHG | DIASTOLIC BLOOD PRESSURE: 80 MMHG

## 2018-09-19 DIAGNOSIS — M62.838: ICD-10-CM

## 2018-09-19 DIAGNOSIS — J44.9: ICD-10-CM

## 2018-09-19 DIAGNOSIS — M54.12: Primary | ICD-10-CM

## 2018-09-19 DIAGNOSIS — Z87.891: ICD-10-CM

## 2018-09-19 LAB
ADD MANUAL DIFF: NO
ALBUMIN SERPL-MCNC: 4 G/DL (ref 3.4–5)
ALBUMIN/GLOB SERPL: 1.1 {RATIO} (ref 1–2.7)
ALP SERPL-CCNC: 76 U/L (ref 46–116)
ALT SERPL-CCNC: 28 U/L (ref 12–78)
ANION GAP SERPL CALC-SCNC: 7 MMOL/L (ref 5–15)
AST SERPL-CCNC: 20 U/L (ref 15–37)
BASOPHILS NFR BLD AUTO: 0.8 % (ref 0–2)
BILIRUB SERPL-MCNC: 0.4 MG/DL (ref 0.2–1)
BUN SERPL-MCNC: 13 MG/DL (ref 7–18)
CALCIUM SERPL-MCNC: 9.3 MG/DL (ref 8.5–10.1)
CHLORIDE SERPL-SCNC: 102 MMOL/L (ref 98–107)
CK SERPL-CCNC: 287 U/L (ref 26–308)
CO2 SERPL-SCNC: 26 MMOL/L (ref 21–32)
CREAT SERPL-MCNC: 1 MG/DL (ref 0.55–1.3)
EOSINOPHIL NFR BLD AUTO: 1.1 % (ref 0–3)
ERYTHROCYTE [DISTWIDTH] IN BLOOD BY AUTOMATED COUNT: 11.6 % (ref 11.6–14.8)
GLOBULIN SER-MCNC: 3.5 G/DL
HCT VFR BLD CALC: 47.4 % (ref 42–52)
HGB BLD-MCNC: 15.6 G/DL (ref 14.2–18)
LYMPHOCYTES NFR BLD AUTO: 26.6 % (ref 20–45)
MCV RBC AUTO: 82 FL (ref 80–99)
MONOCYTES NFR BLD AUTO: 7.9 % (ref 1–10)
NEUTROPHILS NFR BLD AUTO: 63.6 % (ref 45–75)
PLATELET # BLD: 152 K/UL (ref 150–450)
POTASSIUM SERPL-SCNC: 4 MMOL/L (ref 3.5–5.1)
RBC # BLD AUTO: 5.76 M/UL (ref 4.7–6.1)
SODIUM SERPL-SCNC: 135 MMOL/L (ref 136–145)
WBC # BLD AUTO: 5.8 K/UL (ref 4.8–10.8)

## 2018-09-19 PROCEDURE — 85025 COMPLETE CBC W/AUTO DIFF WBC: CPT

## 2018-09-19 PROCEDURE — 99284 EMERGENCY DEPT VISIT MOD MDM: CPT

## 2018-09-19 PROCEDURE — 82550 ASSAY OF CK (CPK): CPT

## 2018-09-19 PROCEDURE — 71045 X-RAY EXAM CHEST 1 VIEW: CPT

## 2018-09-19 PROCEDURE — 36415 COLL VENOUS BLD VENIPUNCTURE: CPT

## 2018-09-19 PROCEDURE — 93005 ELECTROCARDIOGRAM TRACING: CPT

## 2018-09-19 PROCEDURE — 96374 THER/PROPH/DIAG INJ IV PUSH: CPT

## 2018-09-19 PROCEDURE — 80053 COMPREHEN METABOLIC PANEL: CPT

## 2018-09-19 PROCEDURE — 84484 ASSAY OF TROPONIN QUANT: CPT

## 2018-09-19 PROCEDURE — 72040 X-RAY EXAM NECK SPINE 2-3 VW: CPT

## 2018-09-19 PROCEDURE — 96375 TX/PRO/DX INJ NEW DRUG ADDON: CPT

## 2018-09-19 NOTE — EMERGENCY ROOM REPORT
History of Present Illness


General


Chief Complaint:  Neck Pain


Source:  Patient





Present Illness


HPI


Patient presents with left-sided upper back and neck pain that radiates down 

his left arm.  This started 2 weeks ago.  He was given a prescription for Norco 

which hasn't helped much.  Pain is 10/10 at this time and aching and sharp.  He 

says he gets numbness in his left hand on occasion.





The patient was treated for pneumonia in February.  He stopped smoking 2 months 

ago.  He has COPD.  Denies anterior chest pain or dyspnea.  No fevers, URI sy, 

NVD.





The patient was sent by his physician to have evaluation with EKG, chest x-ray 

and possible neurologic evaluation.





No dysuria, trauma, rashes, weakness, headache.


Allergies:  


Coded Allergies:  


     No Known Allergies (Unverified , 2/6/18)





Patient History


Past Medical History:  see triage record


Social History:  Reports: smoking - Stopped 2 months


Social History Narrative


at home


Reviewed Nursing Documentation:  PMH: Agreed; PSxH: Agreed





Nursing Documentation-PMH


Past Medical History:  No History, Except For


Hx COPD:  Yes





Review of Systems


All Other Systems:  negative except mentioned in HPI





Physical Exam





Vital Signs








  Date Time  Temp Pulse Resp B/P (MAP) Pulse Ox O2 Delivery O2 Flow Rate FiO2


 


9/19/18 10:11 97.7 76 18 149/94 93 Room Air  





 97.7       








Sp02 EP Interpretation:  reviewed, normal


General Appearance:  well appearing, no apparent distress, GCS 15


Head:  normocephalic


Eyes:  bilateral eye normal inspection, bilateral eye PERRL


ENT:  moist mucus membranes


Neck:  supple


Respiratory:  chest non-tender - see back, lungs clear, normal breath sounds


Cardiovascular #1:  regular rate, rhythm


Cardiovascular #2:  2+ radial (R)


Gastrointestinal:  normal inspection, normal bowel sounds, non tender, no mass, 

non-distended


Musculoskeletal:  digits/nails normal, gait/station normal, normal range of 

motion, tender - L upper back inner scapula area with muscle spasm TTP


Neurologic:  alert, oriented x3, motor strength/tone normal, DTRs symmetric, 

sensory intact - though states occasional numbness of back of L hand, normal 

gait, speech normal


Psychiatric:  mood/affect normal


Skin:  normal inspection, warm/dry





Medical Decision Making


Diagnostic Impression:  


 Primary Impression:  


 Cervical radiculopathy


 Additional Impression:  


 Muscle spasm


ER Course


Patient presents with left-sided posterior chest pain.  Need to exclude acute 

myocardial infarction, pneumothorax, infection.  His exam is more consistent 

with muscle spasm.  Also it appears that he's got radiculopathy coming from his 

neck.  Evaluation will be with EKG, chest x-ray, C-spine films and labs.  The 

patient will be treated with IV Toradol and also acetaminophen.





EKG without injury - normal.  CBC, CMP and troponin normal.  C spine with DJD.  

CXR with COPD.





Still with pain.  Morphine ordered.





Improved.





Discussed and showed patient findings.  Discussed treatment plan.





Patient stable for outpatient observation and treatment.





Labs








Test


  9/19/18


10:35


 


White Blood Count


  5.8 K/UL


(4.8-10.8)


 


Red Blood Count


  5.76 M/UL


(4.70-6.10)


 


Hemoglobin


  15.6 G/DL


(14.2-18.0)


 


Hematocrit


  47.4 %


(42.0-52.0)


 


Mean Corpuscular Volume 82 FL (80-99) 


 


Mean Corpuscular Hemoglobin


  27.1 PG


(27.0-31.0)


 


Mean Corpuscular Hemoglobin


Concent 33.0 G/DL


(32.0-36.0)


 


Red Cell Distribution Width


  11.6 %


(11.6-14.8)


 


Platelet Count


  152 K/UL


(150-450)


 


Mean Platelet Volume


  7.2 FL


(6.5-10.1)


 


Neutrophils (%) (Auto)


  63.6 %


(45.0-75.0)


 


Lymphocytes (%) (Auto)


  26.6 %


(20.0-45.0)


 


Monocytes (%) (Auto)


  7.9 %


(1.0-10.0)


 


Eosinophils (%) (Auto)


  1.1 %


(0.0-3.0)


 


Basophils (%) (Auto)


  0.8 %


(0.0-2.0)


 


Sodium Level


  135 MMOL/L


(136-145)


 


Potassium Level


  4.0 MMOL/L


(3.5-5.1)


 


Chloride Level


  102 MMOL/L


()


 


Carbon Dioxide Level


  26 MMOL/L


(21-32)


 


Anion Gap


  7 mmol/L


(5-15)


 


Blood Urea Nitrogen


  13 mg/dL


(7-18)


 


Creatinine


  1.0 MG/DL


(0.55-1.30)


 


Estimat Glomerular Filtration


Rate > 60 mL/min


(>60)


 


Glucose Level


  100 MG/DL


()


 


Calcium Level


  9.3 MG/DL


(8.5-10.1)


 


Total Bilirubin


  0.4 MG/DL


(0.2-1.0)


 


Aspartate Amino Transf


(AST/SGOT) 20 U/L (15-37) 


 


 


Alanine Aminotransferase


(ALT/SGPT) 28 U/L (12-78) 


 


 


Alkaline Phosphatase


  76 U/L


()


 


Total Creatine Kinase


  287 U/L


()


 


Troponin I


  0.017 ng/mL


(0.000-0.056)


 


Total Protein


  7.5 G/DL


(6.4-8.2)


 


Albumin


  4.0 G/DL


(3.4-5.0)


 


Globulin 3.5 g/dL 


 


Albumin/Globulin Ratio 1.1 (1.0-2.7) 








EKG Diagnostic Results


Rate:  normal


Rhythm:  NSR


ST Segments:  no acute changes





Rhythm Strip Diag. Results


EP Interpretation:  yes


Rhythm:  NSR, no PVC's, no ectopy





Chest X-Ray Diagnostic Results


Chest X-Ray Diagnostic Results :  


   Chest X-Ray Ordered:  Yes


   # of Views/Limited/Complete:  1 View


   Indication:  Other


   EP Interpretation:  Yes


   Interpretation:  no consolidation, no effusion, no pneumothorax, other - 

COPD with scarring


   Impression:  Other


   Electronically Signed by:  Esteban Muñoz MD





Other X-Ray Diagnostic Results


Other X-Ray Diagnostic Results :  


   X-Ray ordered:  c spine


   # of Views/Limited Vs Complete:  3 View


   Indication:  Pain


   EP Interpretation:  Yes


   Interpretation:  no dislocation, no soft tissue swelling, no fractures, 

other - DJD


   Impression:  Other


   Electronically Signed by:  Esteban Muñoz MD





Last Vital Signs








  Date Time  Temp Pulse Resp B/P (MAP) Pulse Ox O2 Delivery O2 Flow Rate FiO2


 


9/19/18 12:49 98.2 72 16 130/80 98 Room Air  








Status:  improved


Disposition:  HOME, SELF-CARE


Condition:  Improved


Scripts


Methocarbamol* (ROBAXIN*) 500 Mg Tablet


500 MG PO TID, #10 TAB 0 Refills


   Prov: Esteban Muñoz M.D.         9/19/18 


Ibuprofen* (MOTRIN*) 600 Mg Tablet


600 MG ORAL Q6H PRN for For Pain, #20 TAB


   Prov: Esteban Muñoz M.D.         9/19/18 


Oxycodone/Acetaminophen 5-325* (PERCOCET 5-325 MG TABLET*) 1 Each Tablet


1 TAB ORAL Q6H PRN for For Pain, #10 TAB


   Prov: Esteban Muñoz M.D.         9/19/18











Esteban Muñoz M.D. Sep 19, 2018 10:32

## 2018-09-19 NOTE — DIAGNOSTIC IMAGING REPORT
Indication: Chest pain

 

Technique: One view of the chest

 

Comparison: none

 

Findings: Again demonstrated is generalized mild interstitial prominence and central

bronchial wall thickening. Areas of linear scarring are seen in the right mid upper

lung and in the bilateral lung apices. No acute infiltrates. No effusions. Normal

heart size

 

Impression: Generalized interstitial prominence, likely on the basis of COPD changes,

stable. No acute process

## 2018-09-19 NOTE — DIAGNOSTIC IMAGING REPORT
Indication: Neck pain

 

Technique: 3 views of the cervical spine

 

Comparison: none

 

Findings: There is slight posterior offset of C5 on C6. The remaining bony alignment

is normal. There is degenerative disc narrowing C5-6. The remaining disc spaces are

preserved. No prevertebral soft tissue swelling. No acute fractures. No dislocations.

 

Impression: Degenerative changes, as described

 

No acute bony trauma

## 2019-06-18 ENCOUNTER — HOSPITAL ENCOUNTER (INPATIENT)
Dept: HOSPITAL 72 - EMR | Age: 64
LOS: 4 days | Discharge: HOME | DRG: 192 | End: 2019-06-22
Payer: MEDICARE

## 2019-06-18 VITALS — DIASTOLIC BLOOD PRESSURE: 74 MMHG | SYSTOLIC BLOOD PRESSURE: 132 MMHG

## 2019-06-18 VITALS — WEIGHT: 189.06 LBS | BODY MASS INDEX: 24.26 KG/M2 | HEIGHT: 74 IN

## 2019-06-18 VITALS — SYSTOLIC BLOOD PRESSURE: 124 MMHG | DIASTOLIC BLOOD PRESSURE: 78 MMHG

## 2019-06-18 VITALS — DIASTOLIC BLOOD PRESSURE: 79 MMHG | SYSTOLIC BLOOD PRESSURE: 107 MMHG

## 2019-06-18 VITALS — SYSTOLIC BLOOD PRESSURE: 124 MMHG | DIASTOLIC BLOOD PRESSURE: 77 MMHG

## 2019-06-18 VITALS — DIASTOLIC BLOOD PRESSURE: 79 MMHG | SYSTOLIC BLOOD PRESSURE: 134 MMHG

## 2019-06-18 VITALS — SYSTOLIC BLOOD PRESSURE: 131 MMHG | DIASTOLIC BLOOD PRESSURE: 78 MMHG

## 2019-06-18 VITALS — SYSTOLIC BLOOD PRESSURE: 129 MMHG | DIASTOLIC BLOOD PRESSURE: 81 MMHG

## 2019-06-18 DIAGNOSIS — J44.1: Primary | ICD-10-CM

## 2019-06-18 DIAGNOSIS — G47.00: ICD-10-CM

## 2019-06-18 DIAGNOSIS — K59.00: ICD-10-CM

## 2019-06-18 LAB
ADD MANUAL DIFF: NO
ALBUMIN SERPL-MCNC: 3.8 G/DL (ref 3.4–5)
ALBUMIN/GLOB SERPL: 1.1 {RATIO} (ref 1–2.7)
ALP SERPL-CCNC: 76 U/L (ref 46–116)
ALT SERPL-CCNC: 27 U/L (ref 12–78)
ANION GAP SERPL CALC-SCNC: 7 MMOL/L (ref 5–15)
APPEARANCE UR: CLEAR
APTT PPP: (no result) S
AST SERPL-CCNC: 23 U/L (ref 15–37)
BASOPHILS NFR BLD AUTO: 0.7 % (ref 0–2)
BILIRUB SERPL-MCNC: 0.5 MG/DL (ref 0.2–1)
BUN SERPL-MCNC: 18 MG/DL (ref 7–18)
CALCIUM SERPL-MCNC: 9.3 MG/DL (ref 8.5–10.1)
CHLORIDE SERPL-SCNC: 102 MMOL/L (ref 98–107)
CK MB SERPL-MCNC: 2.6 NG/ML (ref 0–3.6)
CK SERPL-CCNC: 726 U/L (ref 26–308)
CO2 SERPL-SCNC: 28 MMOL/L (ref 21–32)
CREAT SERPL-MCNC: 0.8 MG/DL (ref 0.55–1.3)
EOSINOPHIL NFR BLD AUTO: 0.7 % (ref 0–3)
ERYTHROCYTE [DISTWIDTH] IN BLOOD BY AUTOMATED COUNT: 12.3 % (ref 11.6–14.8)
GLOBULIN SER-MCNC: 3.6 G/DL
GLUCOSE UR STRIP-MCNC: NEGATIVE MG/DL
HCT VFR BLD CALC: 48.5 % (ref 42–52)
HGB BLD-MCNC: 15.9 G/DL (ref 14.2–18)
KETONES UR QL STRIP: NEGATIVE
LEUKOCYTE ESTERASE UR QL STRIP: NEGATIVE
LYMPHOCYTES NFR BLD AUTO: 16.2 % (ref 20–45)
MCV RBC AUTO: 81 FL (ref 80–99)
MONOCYTES NFR BLD AUTO: 9.9 % (ref 1–10)
NEUTROPHILS NFR BLD AUTO: 72.5 % (ref 45–75)
NITRITE UR QL STRIP: NEGATIVE
PH UR STRIP: 7 [PH] (ref 4.5–8)
PLATELET # BLD: 134 K/UL (ref 150–450)
POTASSIUM SERPL-SCNC: 3.8 MMOL/L (ref 3.5–5.1)
PROT UR QL STRIP: NEGATIVE
RBC # BLD AUTO: 6.01 M/UL (ref 4.7–6.1)
SODIUM SERPL-SCNC: 137 MMOL/L (ref 136–145)
SP GR UR STRIP: 1 (ref 1–1.03)
UROBILINOGEN UR-MCNC: NORMAL MG/DL (ref 0–1)
WBC # BLD AUTO: 6.8 K/UL (ref 4.8–10.8)

## 2019-06-18 PROCEDURE — 71045 X-RAY EXAM CHEST 1 VIEW: CPT

## 2019-06-18 PROCEDURE — 82553 CREATINE MB FRACTION: CPT

## 2019-06-18 PROCEDURE — 80048 BASIC METABOLIC PNL TOTAL CA: CPT

## 2019-06-18 PROCEDURE — 96365 THER/PROPH/DIAG IV INF INIT: CPT

## 2019-06-18 PROCEDURE — 94640 AIRWAY INHALATION TREATMENT: CPT

## 2019-06-18 PROCEDURE — 85007 BL SMEAR W/DIFF WBC COUNT: CPT

## 2019-06-18 PROCEDURE — 93005 ELECTROCARDIOGRAM TRACING: CPT

## 2019-06-18 PROCEDURE — 87205 SMEAR GRAM STAIN: CPT

## 2019-06-18 PROCEDURE — 84484 ASSAY OF TROPONIN QUANT: CPT

## 2019-06-18 PROCEDURE — 83605 ASSAY OF LACTIC ACID: CPT

## 2019-06-18 PROCEDURE — 96366 THER/PROPH/DIAG IV INF ADDON: CPT

## 2019-06-18 PROCEDURE — 85025 COMPLETE CBC W/AUTO DIFF WBC: CPT

## 2019-06-18 PROCEDURE — 80053 COMPREHEN METABOLIC PANEL: CPT

## 2019-06-18 PROCEDURE — 94664 DEMO&/EVAL PT USE INHALER: CPT

## 2019-06-18 PROCEDURE — 87040 BLOOD CULTURE FOR BACTERIA: CPT

## 2019-06-18 PROCEDURE — 81003 URINALYSIS AUTO W/O SCOPE: CPT

## 2019-06-18 PROCEDURE — 36415 COLL VENOUS BLD VENIPUNCTURE: CPT

## 2019-06-18 PROCEDURE — 82550 ASSAY OF CK (CPK): CPT

## 2019-06-18 PROCEDURE — 99285 EMERGENCY DEPT VISIT HI MDM: CPT

## 2019-06-18 PROCEDURE — 87070 CULTURE OTHR SPECIMN AEROBIC: CPT

## 2019-06-18 RX ADMIN — DEXTROSE MONOHYDRATE SCH MLS/HR: 50 INJECTION, SOLUTION INTRAVENOUS at 21:24

## 2019-06-18 RX ADMIN — THEOPHYLLINE ANHYDROUS SCH MG: 100 CAPSULE, EXTENDED RELEASE ORAL at 21:24

## 2019-06-18 RX ADMIN — HEPARIN SODIUM SCH UNITS: 5000 INJECTION INTRAVENOUS; SUBCUTANEOUS at 21:28

## 2019-06-18 RX ADMIN — METHYLPREDNISOLONE SODIUM SUCCINATE SCH MG: 125 INJECTION, POWDER, FOR SOLUTION INTRAMUSCULAR; INTRAVENOUS at 18:00

## 2019-06-18 RX ADMIN — METHOCARBAMOL SCH MG: 500 TABLET, FILM COATED ORAL at 21:23

## 2019-06-18 RX ADMIN — TRAZODONE HYDROCHLORIDE SCH MG: 50 TABLET ORAL at 21:23

## 2019-06-18 NOTE — NUR
ED Nurse Note:

pt remains without worsening s/s.  resp therapist aware to administer another 
hhn.  tolerate po intake well.  aware and agrees to plan for admission.  vss.

## 2019-06-18 NOTE — NUR
ED Nurse Note:

pt remains wihtout increased dyspnea.  no swabs sent as doesnt meet critieria.  
pt aware and agrees to admission here.  belongings list done.

## 2019-06-18 NOTE — NUR
NURSE NOTES:

Patient arrived from ED at 1850 via bed, accompanied by ER tech. Patient is awake alert and 
oriented x4, no acute distress noted. Ambulating steadily. RR even and unlabored, patient 
denies SOB, states his breathing is "much better". VS taken and stable. Patient oriented to 
room, side rails upx2, bed low and locked, call light in reach, will endorse admission to 
night shift nurse.

## 2019-06-18 NOTE — EMERGENCY ROOM REPORT
History of Present Illness


General


Chief Complaint:  Upper Respiratory Illness


Source:  Patient





Present Illness


HPI


This patient states he has had ongoing cough with sputum production for the 

past 3 weeks.  He states that he did see his primary care physician and 

finished a course of azithromycin last week.  He has been using his Spiriva and 

Advair.  He states that he continues to have shortness of breath.  He states 

his symptoms are worse on exertion.  He also has significant amount of thick 

green sputum production.  He states he has been unable to sleep at night.  He 

denies fever or chills.  He denies nausea or vomiting.  He has no other 

complaints.


Allergies:  


Coded Allergies:  


     Tuna (Verified  Allergy, Unknown, 6/18/19)





Patient History


Past Medical History:  see triage record, COPD, other - HCV


Social History:  Denies: smoking - Quit last October, alcohol use, drug use





Nursing Documentation-[facility]


Past Medical History:  No History, Except For


Hx COPD:  Yes





Review of Systems


All Other Systems:  negative except mentioned in HPI





Physical Exam





Vital Signs








  Date Time  Temp Pulse Resp B/P (MAP) Pulse Ox O2 Delivery O2 Flow Rate FiO2


 


6/18/19 11:01 98.1 79 18 130/79 (96) 96 Room Air  


 


6/18/19 12:58        21








Sp02 EP Interpretation:  reviewed, normal


General Appearance:  no apparent distress, alert, GCS 15, non-toxic


Head:  normocephalic, atraumatic


Eyes:  bilateral eye normal inspection, bilateral eye PERRL


ENT:  hearing grossly normal, normal pharynx, no angioedema, normal voice


Neck:  full range of motion, supple/symm/no masses


Respiratory:  chest non-tender, no respiratory distress, no retraction, no 

accessory muscle use, speaking full sentences, wheezing, expiration


Cardiovascular #1:  regular rate, rhythm, no edema


Gastrointestinal:  normal bowel sounds, non tender, soft, non-distended, no 

guarding, no rebound


Rectal:  deferred


Musculoskeletal:  back normal, gait/station normal, normal range of motion, non-

tender, calf tenderness


Neurologic:  alert, oriented x3, responsive, motor strength/tone normal, 

sensory intact, speech normal


Psychiatric:  judgement/insight normal, memory normal, mood/affect normal, no 

suicidal/homicidal ideation


Skin:  normal color, no rash, warm/dry, well hydrated





Medical Decision Making


Diagnostic Impression:  


 Primary Impression:  


 COPD exacerbation


ER Course


This patient presents with COPD exacerbation.  I did give the patient albuterol 

and prednisone and broad-spectrum antibiotics.  He continued to have expiratory 

wheezes on exam and an irritable cough and shortness of breath.  Therefore, I 

will admit this patient for pulmonary hygiene and evaluation by pulmonology.





Laboratory Tests








Test


  6/18/19


11:30


 


White Blood Count


  6.8 K/UL


(4.8-10.8)


 


Red Blood Count


  6.01 M/UL


(4.70-6.10)


 


Hemoglobin


  15.9 G/DL


(14.2-18.0)


 


Hematocrit


  48.5 %


(42.0-52.0)


 


Mean Corpuscular Volume 81 FL (80-99)  


 


Mean Corpuscular Hemoglobin


  26.4 PG


(27.0-31.0)  L


 


Mean Corpuscular Hemoglobin


Concent 32.8 G/DL


(32.0-36.0)


 


Red Cell Distribution Width


  12.3 %


(11.6-14.8)


 


Platelet Count


  134 K/UL


(150-450)  L


 


Mean Platelet Volume


  9.2 FL


(6.5-10.1)


 


Neutrophils (%) (Auto)


  72.5 %


(45.0-75.0)


 


Lymphocytes (%) (Auto)


  16.2 %


(20.0-45.0)  L


 


Monocytes (%) (Auto)


  9.9 %


(1.0-10.0)


 


Eosinophils (%) (Auto)


  0.7 %


(0.0-3.0)


 


Basophils (%) (Auto)


  0.7 %


(0.0-2.0)


 


Sodium Level


  137 MMOL/L


(136-145)


 


Potassium Level


  3.8 MMOL/L


(3.5-5.1)


 


Chloride Level


  102 MMOL/L


()


 


Carbon Dioxide Level


  28 MMOL/L


(21-32)


 


Anion Gap


  7 mmol/L


(5-15)


 


Blood Urea Nitrogen


  18 mg/dL


(7-18)


 


Creatinine


  0.8 MG/DL


(0.55-1.30)


 


Estimate Glomerular


Filtration Rate > 60 mL/min


(>60)


 


Glucose Level


  110 MG/DL


()  H


 


Lactic Acid Level


  1.60 mmol/L


(0.4-2.0)


 


Calcium Level


  9.3 MG/DL


(8.5-10.1)


 


Total Bilirubin


  0.5 MG/DL


(0.2-1.0)


 


Aspartate Amino Transferase


(AST) 23 U/L (15-37)


 


 


Alanine Aminotransferase (ALT)


  27 U/L (12-78)


 


 


Alkaline Phosphatase


  76 U/L


()


 


Total Creatine Kinase


  726 U/L


()  H


 


Creatine Kinase MB


  2.6 NG/ML


(0.0-3.6)


 


Creatine Kinase MB Relative


Index 0.3  


 


 


Troponin I


  0.017 ng/mL


(0.000-0.056)


 


Total Protein


  7.4 G/DL


(6.4-8.2)


 


Albumin


  3.8 G/DL


(3.4-5.0)


 


Globulin 3.6 g/dL  


 


Albumin/Globulin Ratio 1.1 (1.0-2.7)  








EKG Diagnostic Results


Rate:  normal


Rhythm:  NSR


ST Segments:  no acute changes





Rhythm Strip Diag. Results


EP Interpretation:  yes


Rate:  70's


Rhythm:  NSR, no PVC's, no ectopy





Chest X-Ray Diagnostic Results


Chest X-Ray Diagnostic Results :  


   Chest X-Ray Ordered:  Yes


   # of Views/Limited/Complete:  1 View


   EP Interpretation:  Yes


   Interpretation:  no consolidation, no effusion, no pneumothorax, no acute 

cardiopulmonary disease, other - Hyperinflated


   Impression:  No acute disease


   Electronically Signed by:  Lilia Coreas DO





Last Vital Signs








  Date Time  Temp Pulse Resp B/P (MAP) Pulse Ox O2 Delivery O2 Flow Rate FiO2


 


6/18/19 13:15  79 19  100 Room Air  


 


6/18/19 12:58        21


 


6/18/19 12:18    129/81    


 


6/18/19 11:01 98.1       








Disposition:  ADMITTED AS INPATIENT


Condition:  Serious


Referrals:  


Juan M Uriarte DO (PCP)











Lilia Coreas DO Jun 18, 2019 14:50

## 2019-06-18 NOTE — DIAGNOSTIC IMAGING REPORT
Indication: Cough

 

Technique: One view of the chest

 

Comparison: 9/19/2018

 

Findings: Lungs pleural spaces are clear. Heart size is normal. Prominent

interstitial markings and areas of scarring, upper lobe hyperinflation suggests COPD

changes. No significant interim change

 

Impression: No acute process

 

Suspect COPD

## 2019-06-18 NOTE — NUR
NURSE NOTES:

RECEIVED PT FROM DARA DIETZ. PT IS AWAKE, SITTING UP IN BED, AAOX4. PT IS ON ROOM AIR, NO 
ACUTE DISTRESS NOTED. PT DENIES SOB AND PAIN AT THE MOMENT. IV ON LEFT FOREARM 20G IS INTACT 
AND PATENT. BED IS LOCKED AT THE LOWEST POSITION WITH BED ALARMS ACTIVE, SIDE RAILS UP X2, 
AND CALL LIGHT IS WITHIN REACH. WILL CONTINUE TO MONITOR.

## 2019-06-18 NOTE — NUR
ED Nurse Note:

pt presents from home with c/o contiued cough and green phlegm x 3 weeks 
despite course of abx therapy.  denies cp, n/v.  able to speak full sentences 
well.  deep inspiration does induce coughing.  lungs with exp wheeze with the 
cough noted.

## 2019-06-18 NOTE — NUR
ED Nurse Note:

tolerates 1st 1000ml ivf well.  continues to speak full sentences well.  resp 
therapist aware to administer hhn.

## 2019-06-19 VITALS — SYSTOLIC BLOOD PRESSURE: 129 MMHG | DIASTOLIC BLOOD PRESSURE: 82 MMHG

## 2019-06-19 VITALS — SYSTOLIC BLOOD PRESSURE: 145 MMHG | DIASTOLIC BLOOD PRESSURE: 90 MMHG

## 2019-06-19 VITALS — DIASTOLIC BLOOD PRESSURE: 80 MMHG | SYSTOLIC BLOOD PRESSURE: 119 MMHG

## 2019-06-19 VITALS — SYSTOLIC BLOOD PRESSURE: 130 MMHG | DIASTOLIC BLOOD PRESSURE: 80 MMHG

## 2019-06-19 VITALS — SYSTOLIC BLOOD PRESSURE: 113 MMHG | DIASTOLIC BLOOD PRESSURE: 64 MMHG

## 2019-06-19 VITALS — SYSTOLIC BLOOD PRESSURE: 122 MMHG | DIASTOLIC BLOOD PRESSURE: 78 MMHG

## 2019-06-19 RX ADMIN — HEPARIN SODIUM SCH UNITS: 5000 INJECTION INTRAVENOUS; SUBCUTANEOUS at 08:51

## 2019-06-19 RX ADMIN — PROMETHAZINE HYDROCHLORIDE PRN ML: 6.25 SOLUTION ORAL at 23:33

## 2019-06-19 RX ADMIN — POLYETHYLENE GLYCOL 3350 SCH GM: 17 POWDER, FOR SOLUTION ORAL at 21:00

## 2019-06-19 RX ADMIN — THEOPHYLLINE ANHYDROUS SCH MG: 100 CAPSULE, EXTENDED RELEASE ORAL at 08:47

## 2019-06-19 RX ADMIN — DEXTROSE MONOHYDRATE SCH MLS/HR: 50 INJECTION, SOLUTION INTRAVENOUS at 14:01

## 2019-06-19 RX ADMIN — METHOCARBAMOL SCH MG: 500 TABLET, FILM COATED ORAL at 12:35

## 2019-06-19 RX ADMIN — DOCUSATE SODIUM SCH MG: 100 CAPSULE, LIQUID FILLED ORAL at 17:09

## 2019-06-19 RX ADMIN — METHOCARBAMOL SCH MG: 500 TABLET, FILM COATED ORAL at 17:10

## 2019-06-19 RX ADMIN — METHYLPREDNISOLONE SODIUM SUCCINATE SCH MG: 125 INJECTION, POWDER, FOR SOLUTION INTRAMUSCULAR; INTRAVENOUS at 00:44

## 2019-06-19 RX ADMIN — HEPARIN SODIUM SCH UNITS: 5000 INJECTION INTRAVENOUS; SUBCUTANEOUS at 21:26

## 2019-06-19 RX ADMIN — THEOPHYLLINE ANHYDROUS SCH MG: 100 CAPSULE, EXTENDED RELEASE ORAL at 17:10

## 2019-06-19 RX ADMIN — PROMETHAZINE HYDROCHLORIDE PRN ML: 6.25 SOLUTION ORAL at 09:03

## 2019-06-19 RX ADMIN — TRAZODONE HYDROCHLORIDE SCH MG: 50 TABLET ORAL at 21:00

## 2019-06-19 RX ADMIN — METHYLPREDNISOLONE SODIUM SUCCINATE SCH MG: 125 INJECTION, POWDER, FOR SOLUTION INTRAMUSCULAR; INTRAVENOUS at 23:32

## 2019-06-19 RX ADMIN — DEXTROSE MONOHYDRATE SCH MLS/HR: 50 INJECTION, SOLUTION INTRAVENOUS at 05:38

## 2019-06-19 RX ADMIN — METHYLPREDNISOLONE SODIUM SUCCINATE SCH MG: 125 INJECTION, POWDER, FOR SOLUTION INTRAMUSCULAR; INTRAVENOUS at 12:35

## 2019-06-19 RX ADMIN — DEXTROSE MONOHYDRATE SCH MLS/HR: 50 INJECTION, SOLUTION INTRAVENOUS at 21:26

## 2019-06-19 RX ADMIN — METHYLPREDNISOLONE SODIUM SUCCINATE SCH MG: 125 INJECTION, POWDER, FOR SOLUTION INTRAMUSCULAR; INTRAVENOUS at 05:38

## 2019-06-19 RX ADMIN — METHYLPREDNISOLONE SODIUM SUCCINATE SCH MG: 125 INJECTION, POWDER, FOR SOLUTION INTRAMUSCULAR; INTRAVENOUS at 17:09

## 2019-06-19 RX ADMIN — METHOCARBAMOL SCH MG: 500 TABLET, FILM COATED ORAL at 08:49

## 2019-06-19 NOTE — NUR
NURSE NOTES:

Dr PAT visited pt and he is aware about PLT, ordered hold heparin if PLT<[id number]. noted 
and carried out. will continue to monitor.

## 2019-06-19 NOTE — NUR
CASE MANAGEMENT: INITIAL REVIEW 



65 YO M PRESENTED TO OUR ED FROM HOME 



CC: URI 



PMHx: COPD



Si:COPD EXACERBATION. 

T 98.1 HR 79 RR 18 B/P 130/79 SATS 96% ON RA 

 TOTAL  



IS: CEFEPIME IV X1 

NS BOLUS X1 

DUO NEB HHN X1 

PREDNISONE IV X1 



***PATIENT ADMITTED TO MED/SURG STATUS 6/18/2019 @ 7012***



DCP: PATIENT TO BE DISCHARGED TO HOME ONCE MEDICALLY CLEARED. 



PLAN OF CARE: 

 PT EVAL 

-------------------------------------------------------------------------------

Addendum: 06/20/19 at 0944 by Larisa Villalta CM

-------------------------------------------------------------------------------

INTERQUAL MET

## 2019-06-19 NOTE — HISTORY AND PHYSICAL REPORT
DATE OF ADMISSION:  06/18/2019

DATE AND TIME SEEN:  06/19/2019 at 3 p.m.



CONSULTANT:  Dr. Toan Norman.



CHIEF COMPLAINT:  Shortness of breath, COPD exacerbation.



BRIEF HISTORY:  This is a 64-year-old male, who lives at home, comes to my

office monthly, presents with three weeks of increased shortness of

breath, coughing and congestion, came to [place], diagnosed with COPD

exacerbation, admitted to medical floor for further treatment.  Currently,

calm in bed, slight short of breath.  No complaint.



REVIEW OF SYSTEMS:  No chest pain.  Slight short of breath.  No nausea,

vomiting, or diarrhea.



PAST MEDICAL HISTORY:  COPD and emphysema.



PAST SURGICAL HISTORY:  Right knee.



ALLERGIES:  Denies.



MEDICATIONS:  Include fleets enema, _____ sodium, Zosyn, Seroquel, Desyrel,

heparin, Restoril, albuterol, Robaxin, and methylprednisolone.



SOCIAL HISTORY:  No smoking.  Positive alcohol.  No intravenous drug abuse.

Positive marijuana use.



PHYSICAL EXAMINATION:

GENERAL:  Calm in bed, oriented x3, in no acute distress.

VITAL SIGNS:  Temperature 98 degrees, pulse 76, respirations 19, and blood

pressure 129/82.

CARDIOVASCULAR:  No murmur.

LUNGS:  Poor exchange, slight wheeze bilaterally.

ABDOMEN:  Bowel sounds positive.  Nontender.  Nondistended.

EXTREMITIES:  No cyanosis, clubbing, or edema.

NEUROLOGIC:  The patient moves all extremities, slightly weak.



LABORATORY AND DIAGNOSTIC DATA:  Labs at this time show platelets 134,

otherwise CBC is normal.  BMP showed glucose 110.  .  Troponin

0.017.  Urinalysis is negative.



ASSESSMENT:  Chronic obstructive pulmonary disease exacerbation.



PLAN:

1. O2 and pulmonary treatment.

2. Antibiotics as ordered.

3. Taper off steroids.

4. We will continue to follow the patient.









  ______________________________________________

  Juan M Uriarte D.O.





DR:  TANIKA

D:  06/19/2019 15:04

T:  06/19/2019 17:11

JOB#:  [id number]

CC:

## 2019-06-19 NOTE — NUR
NURSE NOTES:

Received pt from RN THU. pt is alert and orient x4. Pt is in RA, No SOB or acute respiratory 
distress noted. Pt has intact iv access LFA 20G SL. pt is eating breakfast. All needs 
attended, bed is locked and is in the lowest position, call light within east reach. will 
continue to monitor.

## 2019-06-19 NOTE — CONSULTATION
History of Present Illness


General


Chief Complaint:  Upper Respiratory Illness





Present Illness


Allergies:  


Coded Allergies:  


     Tuna (Verified  Allergy, Unknown, 6/18/19)





Medication History


Scheduled


Fluticasone/Salmeterol (Advair 250-50 Diskus), 1 PUFF INH EVERY 12 HOURS, (

Reported)


Methocarbamol* (Robaxin*), 500 MG PO TID


Nicotine 14MG Patch* (Nicoderm Cq 14MG*), 1 EACH TD DAILY, (Reported)


Quetiapine Fumarate* (Seroquel*), 100 MG ORAL QHS, (Reported)


Theophylline (Theodur*), 100 MG ORAL TWICE A DAY, (Reported)


Trazodone Hcl* (Desyrel*), 50 MG ORAL BEDTIME, (Reported)





Scheduled PRN


Cyclobenzaprine Hcl* (Flexeril*), 10 MG ORAL QHS PRN for For Pain, (Reported)


Ibuprofen (Ibuprofen*), 800 MG ORAL THREE TIMES A DAY PRN for For Pain, (

Reported)


Ibuprofen* (Motrin*), 600 MG ORAL Q6H PRN for For Pain


Ipratropium/Albuterol Sulfate (Combivent Respimat Inhal [place]), 1 PUFF IH for 

Bronchospasm, (Reported)


Oxycodone/Acetaminophen 5-325* (Percocet 5-325 Mg Tablet*), 1 TAB ORAL Q6H PRN 

for For Pain





Patient History


Healthcare decision maker





Resuscitation status


Full Code


Advanced Directive on File








Physical Exam





Last 24 Hour Vital Signs








  Date Time  Temp Pulse Resp B/P (MAP) Pulse Ox O2 Delivery O2 Flow Rate FiO2


 


6/19/19 12:00 98.0 76 19 129/82 (98) 98   


 


6/19/19 08:00 98.0 77 20 122/78 (93) 97   


 


6/19/19 07:56  94 20  93 Room Air  21


 


6/19/19 04:00 97.6 75 20 119/80 (93) 92   


 


6/19/19 02:52  115 18  100 Room Air  21


 


6/19/19 02:35  112 20  98 Room Air  21


 


6/19/19 00:00 97.3 85 20 113/64 (80) 92   


 


6/18/19 23:07      Room Air  


 


6/18/19 20:00 98.8 78 22 134/79 (97) 94   


 


6/18/19 18:50 97.8 76 20 131/78 (95) 96   


 


6/18/19 18:43 98.0 80 18 127/70 96 Room Air  21


 


6/18/19 18:21 98.1 85 20 132/74 95 Room Air  21


 


6/18/19 17:31 98.1 81 19 124/77 96 Room Air  21


 


6/18/19 16:30  80 20 107/79 96 Room Air  


 


6/18/19 15:29  78 20  95 Room Air  


 


6/18/19 15:28  77 20 124/78 96 Room Air  

















Intake and Output  


 


 6/18/19 6/19/19





 18:59 06:59


 


Intake Total 2695 ml 830.0 ml


 


Output Total 500 ml 1480 ml


 


Balance 2195 ml -650.0 ml


 


  


 


Intake Oral 240 ml 


 


IV Total 2455 ml 110.0 ml


 


Other  720 ml


 


Output Urine Total 500 ml 1480 ml











Laboratory Tests








Test


  6/18/19


16:00


 


Urine Color Pale yellow  


 


Urine Appearance Clear  


 


Urine pH 7 (4.5-8.0)  


 


Urine Specific Gravity


  1.005


(1.005-1.035)


 


Urine Protein


  Negative


(NEGATIVE)


 


Urine Glucose (UA)


  Negative


(NEGATIVE)


 


Urine Ketones


  Negative


(NEGATIVE)


 


Urine Blood


  Negative


(NEGATIVE)


 


Urine Nitrite


  Negative


(NEGATIVE)


 


Urine Bilirubin


  Negative


(NEGATIVE)


 


Urine Urobilinogen


  Normal MG/DL


(0.0-1.0)


 


Urine Leukocyte Esterase


  Negative


(NEGATIVE)








Height (Feet):  6


Height (Inches):  2.00


Weight (Pounds):  189


Medications





Current Medications








 Medications


  (Trade)  Dose


 Ordered  Sig/Zana


 Route


 PRN Reason  Start Time


 Stop Time Status Last Admin


Dose Admin


 


 Albuterol/


 Ipratropium


  (Albuterol/


 Ipratropium)  3 ml  Q4H  PRN


 HHN


 dyspnea  6/18/19 16:00


 6/23/19 15:59  6/19/19 02:44


 


 


 Cyclobenzaprine


 HCl


  (Flexeril)  10 mg  QHS  PRN


 ORAL


 For Pain  6/18/19 16:00


 7/18/19 15:59   


 


 


 Dextrose


  (Dextrose 50%)  25 ml  Q30M  PRN


 IV


 Hypoglycemia  6/18/19 16:00


 7/18/19 15:59   


 


 


 Dextrose


  (Dextrose 50%)  50 ml  Q30M  PRN


 IV


 Hypoglycemia  6/18/19 16:00


 7/18/19 15:59   


 


 


 Docusate Sodium


  (Colace)  100 mg  THREE TIMES A  DAY


 ORAL


   6/19/19 18:00


 7/19/19 17:59 UNV  


 


 


 Heparin Sodium


  (Porcine)


  (Heparin 5000


 units/ml)  5,000 units  EVERY 12  HOURS


 SUBQ


   6/18/19 21:00


 7/18/19 20:59  6/18/19 21:28


 


 


 Lorazepam


  (Ativan 2mg/ml


 1ml)  0.5 mg  Q4H  PRN


 IV


 For Anxiety  6/18/19 16:00


 6/25/19 15:59   


 


 


 Methocarbamol


  (Robaxin)  500 mg  TID


 ORAL


   6/18/19 19:00


 7/18/19 18:59  6/19/19 12:35


 


 


 Methylprednisolone


 Sodium Succinate


  (Solu-MEDROL)  60 mg  EVERY 6  HOURS


 IV


   6/18/19 18:00


 7/18/19 17:59  6/19/19 12:35


 


 


 Mineral Oil


  (Fleet's Mineral


 Oil Enema)  133 ml  EVERY OTHER  DAY


 RECTAL


   6/21/19 09:00


 7/21/19 08:59 UNV  


 


 


 Morphine Sulfate


  (Morphine


 Sulfate)  2 mg  Q4H  PRN


 IVP


 severe pain 7-10  6/18/19 16:00


 6/25/19 15:59   


 


 


 Nitroglycerin


  (Ntg)  0.4 mg  Q5M X 3 DOSES PRN


 SL


 Prn Chest Pain  6/18/19 16:00


 7/18/19 15:59   


 


 


 Ondansetron HCl


  (Zofran)  4 mg  Q6H  PRN


 IVP


 Nausea & Vomiting  6/18/19 16:00


 7/18/19 15:59   


 


 


 Oxycodone/


 Acetaminophen


  (Percocet 5-325)  1 tab  Q6H  PRN


 ORAL


 Moderate Pain (Pain Scale 4-6)  6/18/19 16:00


 6/25/19 15:59   


 


 


 Piperacillin Sod/


 Tazobactam Sod


 3.375 gm/Sodium


 Chloride  110 ml @ 


 27.5 mls/hr  EVERY 8  HOURS


 IVPB


   6/18/19 22:00


 6/23/19 21:59  6/19/19 14:01


 


 


 Polyethylene


 Glycol


  (Miralax)  17 gm  BEDTIME


 ORAL


   6/19/19 21:00


 7/19/19 20:59 UNV  


 


 


 Promethazine HCl/


 Codeine


  (Phenergan with


 Codeine)  5 ml  Q6H  PRN


 ORAL


 cough  6/18/19 16:00


 7/18/19 15:59  6/19/19 09:03


 


 


 Quetiapine


 Fumarate


  (SEROquel)  100 mg  QHS


 ORAL


   6/18/19 21:00


 7/18/19 20:59  6/18/19 21:24


 


 


 Sennosides


  (Senokot)  8.6 mg  DAILY


 ORAL


   6/20/19 09:00


 7/20/19 08:59 UNV  


 


 


 Temazepam


  (Restoril)  15 mg  HSPRN  PRN


 ORAL


 Insomnia  6/18/19 21:00


 6/25/19 20:59   


 


 


 Theophylline


  (Julien-Dur)  100 mg  TWICE A  DAY


 ORAL


   6/18/19 19:30


 7/18/19 19:29  6/19/19 08:47


 


 


 Trazodone HCl


  (Desyrel)  50 mg  BEDTIME


 ORAL


   6/18/19 21:00


 7/18/19 20:59  6/18/19 21:23


 











Assessment/Plan


Problem List:  


(1) Acute exacerbation of chronic obstructive pulmonary disease (COPD)


ICD Codes:  J44.1 - Chronic obstructive pulmonary disease with (acute) 

exacerbation


SNOMED:  349938692


(2) Constipation


ICD Codes:  K59.00 - Constipation, unspecified


SNOMED:  20154132


(3) Emphysema lung


ICD Codes:  J43.9 - Emphysema, unspecified


SNOMED:  01747892











Toan Norman MD Jun 19, 2019 14:58

## 2019-06-19 NOTE — NUR
NURSE NOTES:

Received pt from Bushra RN. pt is alert and orient x4. Pt is on room air with no SOB or 
acute respiratory distress noted at this time. Pt has intact iv access LFA 20G SL All needs 
attended to , bed is locked and is in the lowest position, call light is within reach. Will 
continue to monitor and follow plan of care.

## 2019-06-20 VITALS — DIASTOLIC BLOOD PRESSURE: 80 MMHG | SYSTOLIC BLOOD PRESSURE: 125 MMHG

## 2019-06-20 VITALS — SYSTOLIC BLOOD PRESSURE: 125 MMHG | DIASTOLIC BLOOD PRESSURE: 75 MMHG

## 2019-06-20 VITALS — DIASTOLIC BLOOD PRESSURE: 78 MMHG | SYSTOLIC BLOOD PRESSURE: 131 MMHG

## 2019-06-20 VITALS — DIASTOLIC BLOOD PRESSURE: 87 MMHG | SYSTOLIC BLOOD PRESSURE: 147 MMHG

## 2019-06-20 VITALS — DIASTOLIC BLOOD PRESSURE: 69 MMHG | SYSTOLIC BLOOD PRESSURE: 130 MMHG

## 2019-06-20 VITALS — DIASTOLIC BLOOD PRESSURE: 84 MMHG | SYSTOLIC BLOOD PRESSURE: 136 MMHG

## 2019-06-20 LAB
ADD MANUAL DIFF: YES
ANION GAP SERPL CALC-SCNC: 12 MMOL/L (ref 5–15)
BUN SERPL-MCNC: 14 MG/DL (ref 7–18)
CALCIUM SERPL-MCNC: 9.2 MG/DL (ref 8.5–10.1)
CHLORIDE SERPL-SCNC: 102 MMOL/L (ref 98–107)
CO2 SERPL-SCNC: 26 MMOL/L (ref 21–32)
CREAT SERPL-MCNC: 1 MG/DL (ref 0.55–1.3)
ERYTHROCYTE [DISTWIDTH] IN BLOOD BY AUTOMATED COUNT: 12.4 % (ref 11.6–14.8)
HCT VFR BLD CALC: 43.6 % (ref 42–52)
HGB BLD-MCNC: 14.6 G/DL (ref 14.2–18)
MCV RBC AUTO: 81 FL (ref 80–99)
PLATELET # BLD: 150 K/UL (ref 150–450)
POTASSIUM SERPL-SCNC: 4.4 MMOL/L (ref 3.5–5.1)
RBC # BLD AUTO: 5.39 M/UL (ref 4.7–6.1)
SODIUM SERPL-SCNC: 140 MMOL/L (ref 136–145)
WBC # BLD AUTO: 15.3 K/UL (ref 4.8–10.8)

## 2019-06-20 RX ADMIN — METHYLPREDNISOLONE SODIUM SUCCINATE SCH MG: 125 INJECTION, POWDER, FOR SOLUTION INTRAMUSCULAR; INTRAVENOUS at 12:18

## 2019-06-20 RX ADMIN — DEXTROSE MONOHYDRATE SCH MLS/HR: 50 INJECTION, SOLUTION INTRAVENOUS at 21:25

## 2019-06-20 RX ADMIN — METHOCARBAMOL SCH MG: 500 TABLET, FILM COATED ORAL at 12:31

## 2019-06-20 RX ADMIN — Medication SCH MG: at 08:30

## 2019-06-20 RX ADMIN — DEXTROSE MONOHYDRATE SCH MLS/HR: 50 INJECTION, SOLUTION INTRAVENOUS at 14:07

## 2019-06-20 RX ADMIN — METHYLPREDNISOLONE SODIUM SUCCINATE SCH MG: 125 INJECTION, POWDER, FOR SOLUTION INTRAMUSCULAR; INTRAVENOUS at 06:06

## 2019-06-20 RX ADMIN — DOCUSATE SODIUM SCH MG: 100 CAPSULE, LIQUID FILLED ORAL at 08:29

## 2019-06-20 RX ADMIN — METHOCARBAMOL SCH MG: 500 TABLET, FILM COATED ORAL at 08:30

## 2019-06-20 RX ADMIN — THEOPHYLLINE ANHYDROUS SCH MG: 100 CAPSULE, EXTENDED RELEASE ORAL at 08:31

## 2019-06-20 RX ADMIN — TRAZODONE HYDROCHLORIDE SCH MG: 50 TABLET ORAL at 21:00

## 2019-06-20 RX ADMIN — ZOLPIDEM TARTRATE PRN MG: 5 TABLET ORAL at 21:57

## 2019-06-20 RX ADMIN — DEXTROSE MONOHYDRATE SCH MLS/HR: 50 INJECTION, SOLUTION INTRAVENOUS at 06:06

## 2019-06-20 RX ADMIN — METHOCARBAMOL SCH MG: 500 TABLET, FILM COATED ORAL at 17:35

## 2019-06-20 RX ADMIN — ZOLPIDEM TARTRATE PRN MG: 5 TABLET ORAL at 21:20

## 2019-06-20 RX ADMIN — HEPARIN SODIUM SCH UNITS: 5000 INJECTION INTRAVENOUS; SUBCUTANEOUS at 08:34

## 2019-06-20 RX ADMIN — DOCUSATE SODIUM SCH MG: 100 CAPSULE, LIQUID FILLED ORAL at 17:34

## 2019-06-20 RX ADMIN — HEPARIN SODIUM SCH UNITS: 5000 INJECTION INTRAVENOUS; SUBCUTANEOUS at 21:08

## 2019-06-20 RX ADMIN — THEOPHYLLINE ANHYDROUS SCH MG: 100 CAPSULE, EXTENDED RELEASE ORAL at 17:35

## 2019-06-20 RX ADMIN — DOCUSATE SODIUM SCH MG: 100 CAPSULE, LIQUID FILLED ORAL at 12:31

## 2019-06-20 RX ADMIN — POLYETHYLENE GLYCOL 3350 SCH GM: 17 POWDER, FOR SOLUTION ORAL at 21:00

## 2019-06-20 NOTE — CARDIOLOGY REPORT
--------------- APPROVED REPORT --------------





EKG Measurement

Heart [id number]

WV 134P84

[id number]

[id number]

[id number]





Normal sinus rhythm

Normal ECG

## 2019-06-20 NOTE — PULMONOLOGY PROGRESS NOTE
Assessment/Plan


Problems:  


(1) Acute exacerbation of chronic obstructive pulmonary disease (COPD)


(2) Constipation


(3) Emphysema lung


Assessment/Plan


improving


taper steroids


sputum and blood cultures are negative


respiratory treatment


titrate fio2 to sat of 92%


symptomatic treatment


dvt prophylaxis.





Subjective


Interval Events:  less cough


Allergies:  


Coded Allergies:  


     Tuna (Verified  Allergy, Unknown, 6/18/19)





Objective





Last 24 Hour Vital Signs








  Date Time  Temp Pulse Resp B/P (MAP) Pulse Ox O2 Delivery O2 Flow Rate FiO2


 


6/20/19 12:00 98.5 90 18 125/75 (92) 98   


 


6/20/19 09:20      Room Air  


 


6/20/19 08:27  72 16  94 Room Air  21


 


6/20/19 08:00 98.0 82 18 130/69 (89) 98   


 


6/20/19 04:00 98.6 77 18 131/78 (95) 98   


 


6/20/19 00:00 98.0 79 18 136/84 (101) 98   


 


6/19/19 21:00      Room Air  


 


6/19/19 20:00 98.1 76 18 145/90 (108) 96   


 


6/19/19 16:00 98.3 80 18 130/80 (97) 97   

















Intake and Output  


 


 6/19/19 6/20/19





 18:59 06:59


 


Intake Total 1460.0 ml 


 


Output Total  750 ml


 


Balance 1460.0 ml -750 ml


 


  


 


Intake Oral 1240 ml 


 


IV Total 220.0 ml 


 


Output Urine Total  750 ml


 


# Voids 6 


 


# Bowel Movements 1 








Objective


General Appearance:  WD/WN


HEENT:  normocephalic, atraumatic


Respiratory/Chest:  chest wall non-tender, rhonchi


Cardiovascular:  normal peripheral pulses, normal rate


Abdomen:  normal bowel sounds, soft, non tender


Genitourinary:  normal external genitalia


Extremities:  no clubbing


Skin:  no rash





Microbiology








 Date/Time


Source Procedure


Growth Status


 


 


 6/18/19 11:50


Blood Blood Culture - Preliminary


NO GROWTH AFTER 24 HOURS Resulted


 


 6/18/19 11:30


Blood Blood Culture - Preliminary


NO GROWTH AFTER 24 HOURS Resulted





 6/19/19 05:15


Sputum Gram Stain - Final Resulted


 


 6/19/19 05:15


Sputum Sputum Culture - Preliminary


NORMAL UPPER RESPIRATORY LÓPEZ AT 24 ... Resulted








Laboratory Tests


6/20/19 05:45: 


White Blood Count 15.3H, Red Blood Count 5.39, Hemoglobin 14.6, Hematocrit 43.6

, Mean Corpuscular Volume 81, Mean Corpuscular Hemoglobin 27.1, Mean 

Corpuscular Hemoglobin Concent 33.5, Red Cell Distribution Width 12.4, Platelet 

Count 150, Mean Platelet Volume 8.8, Neutrophils (%) (Auto) , Lymphocytes (%) (

Auto) , Monocytes (%) (Auto) , Eosinophils (%) (Auto) , Basophils (%) (Auto) , 

Differential Total Cells Counted 100, Neutrophils % (Manual) 85H, Lymphocytes % 

(Manual) 10L, Monocytes % (Manual) 5, Eosinophils % (Manual) 0, Basophils % (

Manual) 0, Band Neutrophils 0, Platelet Estimate Adequate, Platelet Morphology 

Normal, Red Blood Cell Morphology Normal, Sodium Level 140, Potassium Level 4.4

, Chloride Level 102, Carbon Dioxide Level 26, Anion Gap 12, Blood Urea 

Nitrogen 14, Creatinine 1.0, Estimat Glomerular Filtration Rate > 60, Glucose 

Level 124H, Calcium Level 9.2





Current Medications








 Medications


  (Trade)  Dose


 Ordered  Sig/Zana


 Route


 PRN Reason  Start Time


 Stop Time Status Last Admin


Dose Admin


 


 Albuterol/


 Ipratropium


  (Albuterol/


 Ipratropium)  3 ml  Q4H  PRN


 HHN


 dyspnea  6/18/19 16:00


 6/23/19 15:59  6/19/19 02:44


 


 


 Cyclobenzaprine


 HCl


  (Flexeril)  10 mg  QHS  PRN


 ORAL


 For Pain  6/18/19 16:00


 7/18/19 15:59   


 


 


 Dextrose


  (Dextrose 50%)  25 ml  Q30M  PRN


 IV


 Hypoglycemia  6/18/19 16:00


 7/18/19 15:59   


 


 


 Dextrose


  (Dextrose 50%)  50 ml  Q30M  PRN


 IV


 Hypoglycemia  6/18/19 16:00


 7/18/19 15:59   


 


 


 Docusate Sodium


  (Colace)  100 mg  THREE TIMES A  DAY


 ORAL


   6/19/19 18:00


 7/19/19 17:59  6/20/19 12:31


 


 


 Heparin Sodium


  (Porcine)


  (Heparin 5000


 units/ml)  5,000 units  EVERY 12  HOURS


 SUBQ


   6/18/19 21:00


 7/18/19 20:59  6/20/19 08:34


 


 


 Lorazepam


  (Ativan 2mg/ml


 1ml)  0.5 mg  Q4H  PRN


 IV


 For Anxiety  6/18/19 16:00


 6/25/19 15:59   


 


 


 Methocarbamol


  (Robaxin)  500 mg  TID


 ORAL


   6/18/19 19:00


 7/18/19 18:59  6/20/19 12:31


 


 


 Methylprednisolone


 Sodium Succinate


  (Solu-MEDROL)  60 mg  EVERY 6  HOURS


 IV


   6/18/19 18:00


 7/18/19 17:59  6/20/19 12:18


 


 


 Mineral Oil


  (Fleet's Mineral


 Oil Enema)  133 ml  EVERY OTHER  DAY


 RECTAL


   6/21/19 09:00


 7/21/19 08:59   


 


 


 Morphine Sulfate


  (Morphine


 Sulfate)  2 mg  Q4H  PRN


 IVP


 severe pain 7-10  6/18/19 16:00


 6/25/19 15:59   


 


 


 Nitroglycerin


  (Ntg)  0.4 mg  Q5M X 3 DOSES PRN


 SL


 Prn Chest Pain  6/18/19 16:00


 7/18/19 15:59   


 


 


 Ondansetron HCl


  (Zofran)  4 mg  Q6H  PRN


 IVP


 Nausea & Vomiting  6/18/19 16:00


 7/18/19 15:59   


 


 


 Oxycodone/


 Acetaminophen


  (Percocet 5-325)  1 tab  Q6H  PRN


 ORAL


 Moderate Pain (Pain Scale 4-6)  6/18/19 16:00


 6/25/19 15:59   


 


 


 Piperacillin Sod/


 Tazobactam Sod


 3.375 gm/Sodium


 Chloride  110 ml @ 


 27.5 mls/hr  EVERY 8  HOURS


 IVPB


   6/18/19 22:00


 6/23/19 21:59  6/20/19 06:06


 


 


 Polyethylene


 Glycol


  (Miralax)  17 gm  BEDTIME


 ORAL


   6/19/19 21:00


 7/19/19 20:59   


 


 


 Promethazine HCl/


 Codeine


  (Phenergan with


 Codeine)  5 ml  Q6H  PRN


 ORAL


 cough  6/18/19 16:00


 7/18/19 15:59  6/19/19 23:33


 


 


 Quetiapine


 Fumarate


  (SEROquel)  100 mg  QHS


 ORAL


   6/18/19 21:00


 7/18/19 20:59  6/18/19 21:24


 


 


 Sennosides


  (Senokot)  8.6 mg  DAILY


 ORAL


   6/20/19 09:00


 7/20/19 08:59  6/20/19 08:30


 


 


 Theophylline


  (Julien-Dur)  100 mg  TWICE A  DAY


 ORAL


   6/18/19 19:30


 7/18/19 19:29  6/20/19 08:31


 


 


 Trazodone HCl


  (Desyrel)  50 mg  BEDTIME


 ORAL


   6/18/19 21:00


 7/18/19 20:59  6/18/19 21:23


 


 


 Zolpidem Tartrate


  (Ambien)  5 mg  HSPRN  PRN


 ORAL


 Insomnia  6/20/19 12:45


 6/27/19 12:44   


 

















Toan Norman MD Jun 20, 2019 13:02

## 2019-06-20 NOTE — GENERAL PROGRESS NOTE
Assessment/Plan


Problem List:  


(1) Insomnia


ICD Codes:  G47.00 - Insomnia, unspecified


SNOMED:  043630505


(2) Acute exacerbation of chronic obstructive pulmonary disease (COPD)


ICD Codes:  J44.1 - Chronic obstructive pulmonary disease with (acute) 

exacerbation


SNOMED:  471913794


(3) Emphysema lung


ICD Codes:  J43.9 - Emphysema, unspecified


SNOMED:  64181075


Status:  unchanged


Assessment/Plan:


o2 pulm tx abx prn taper off steroids ambien prn cbc bmp am





Subjective


Constitutional:  Reports: weakness


Respiratory:  Reports: shortness of breath


Allergies:  


Coded Allergies:  


     Tuna (Verified  Allergy, Unknown, 6/18/19)


All Systems:  reviewed and negative except above


Subjective


calm in bed c/o insomnia





Objective





Last 24 Hour Vital Signs








  Date Time  Temp Pulse Resp B/P (MAP) Pulse Ox O2 Delivery O2 Flow Rate FiO2


 


6/20/19 09:20      Room Air  


 


6/20/19 08:27  72 16  94 Room Air  21


 


6/20/19 08:00 98.0 82 18 130/69 (89) 98   


 


6/20/19 04:00 98.6 77 18 131/78 (95) 98   


 


6/20/19 00:00 98.0 79 18 136/84 (101) 98   


 


6/19/19 21:00      Room Air  


 


6/19/19 20:00 98.1 76 18 145/90 (108) 96   


 


6/19/19 16:00 98.3 80 18 130/80 (97) 97   


 


6/19/19 12:00 98.0 76 19 129/82 (98) 98   

















Intake and Output  


 


 6/19/19 6/20/19





 18:59 06:59


 


Intake Total 1460.0 ml 


 


Output Total  750 ml


 


Balance 1460.0 ml -750 ml


 


  


 


Intake Oral 1240 ml 


 


IV Total 220.0 ml 


 


Output Urine Total  750 ml


 


# Voids 6 


 


# Bowel Movements 1 








Laboratory Tests


6/20/19 05:45: 


White Blood Count 15.3H, Red Blood Count 5.39, Hemoglobin 14.6, Hematocrit 43.6

, Mean Corpuscular Volume 81, Mean Corpuscular Hemoglobin 27.1, Mean 

Corpuscular Hemoglobin Concent 33.5, Red Cell Distribution Width 12.4, Platelet 

Count 150, Mean Platelet Volume 8.8, Neutrophils (%) (Auto) , Lymphocytes (%) (

Auto) , Monocytes (%) (Auto) , Eosinophils (%) (Auto) , Basophils (%) (Auto) , 

Differential Total Cells Counted 100, Neutrophils % (Manual) 85H, Lymphocytes % 

(Manual) 10L, Monocytes % (Manual) 5, Eosinophils % (Manual) 0, Basophils % (

Manual) 0, Band Neutrophils 0, Platelet Estimate Adequate, Platelet Morphology 

Normal, Red Blood Cell Morphology Normal, Sodium Level 140, Potassium Level 4.4

, Chloride Level 102, Carbon Dioxide Level 26, Anion Gap 12, Blood Urea 

Nitrogen 14, Creatinine 1.0, Estimat Glomerular Filtration Rate > 60, Glucose 

Level 124H, Calcium Level 9.2


Height (Feet):  6


Height (Inches):  2.00


Weight (Pounds):  189


General Appearance:  alert


EENT:  normal ENT inspection


Neck:  normal alignment


Cardiovascular:  normal peripheral pulses, normal rate, regular rhythm


Respiratory/Chest:  chest wall non-tender, lungs clear, normal breath sounds


Abdomen:  normal bowel sounds, non tender, soft


Extremities:  normal inspection


Edema:  no edema noted Arm (L), no edema noted Arm (R), no edema noted Leg (L), 

no edema noted Leg (R), no edema noted Pedal (L), no edema noted Pedal (R), no 

edema noted Generalized


Neurologic:  responsive, motor weakness


Skin:  normal pigmentation, warm/dry











Juan M Uriarte DO Jun 20, 2019 11:46

## 2019-06-20 NOTE — NUR
NURSE NOTES:

Patient resting respirations unlabored.NO complaints at this time.call light  within reach.

.

## 2019-06-20 NOTE — NUR
NURSE NOTES:

Patient is awake and alert,respirations are unlabored,patient ate breakfast.Call light 
within reach.IV heplock intact.

## 2019-06-20 NOTE — NUR
NURSE NOTES:

Received patient from DARA Morales. Patient was in bed, awake and alert x 4. No respiratory 
distress noted. Patient's IV intact. Bed is in lowest position with 2 side rails up. Bed 
alarm on. Call light in reach. Will continue to monitor the patient.

## 2019-06-20 NOTE — NUR
PT EVALUATION NOTE

Patient seen for initial evaluation, see complete evaluation for details. Patient presents 
with decreased activity tolerance due to SOB. Patient able to walk 40 ft with supervision, 
c/o SOB. Patient will benefit from skilled inpatient PT intervention to address strength, 
safety and activity tolerance with ambulation and stair training incorporating proper 
breathing techniques. Anticipate discharge home once medically cleared by MD. No DME needs 
identified at this time. 

-------------------------------------------------------------------------------

Addendum: 06/20/19 at 1335 by AMY DUGAN PT

-------------------------------------------------------------------------------

Amended: Links added.

## 2019-06-21 VITALS — SYSTOLIC BLOOD PRESSURE: 116 MMHG | DIASTOLIC BLOOD PRESSURE: 77 MMHG

## 2019-06-21 VITALS — SYSTOLIC BLOOD PRESSURE: 127 MMHG | DIASTOLIC BLOOD PRESSURE: 90 MMHG

## 2019-06-21 VITALS — SYSTOLIC BLOOD PRESSURE: 129 MMHG | DIASTOLIC BLOOD PRESSURE: 88 MMHG

## 2019-06-21 VITALS — SYSTOLIC BLOOD PRESSURE: 117 MMHG | DIASTOLIC BLOOD PRESSURE: 73 MMHG

## 2019-06-21 VITALS — DIASTOLIC BLOOD PRESSURE: 82 MMHG | SYSTOLIC BLOOD PRESSURE: 130 MMHG

## 2019-06-21 LAB
ADD MANUAL DIFF: NO
ANION GAP SERPL CALC-SCNC: 10 MMOL/L (ref 5–15)
BASOPHILS NFR BLD AUTO: 0.2 % (ref 0–2)
BUN SERPL-MCNC: 17 MG/DL (ref 7–18)
CALCIUM SERPL-MCNC: 9.3 MG/DL (ref 8.5–10.1)
CHLORIDE SERPL-SCNC: 103 MMOL/L (ref 98–107)
CO2 SERPL-SCNC: 28 MMOL/L (ref 21–32)
CREAT SERPL-MCNC: 1 MG/DL (ref 0.55–1.3)
EOSINOPHIL NFR BLD AUTO: 0.1 % (ref 0–3)
ERYTHROCYTE [DISTWIDTH] IN BLOOD BY AUTOMATED COUNT: 12.2 % (ref 11.6–14.8)
HCT VFR BLD CALC: 47.3 % (ref 42–52)
HGB BLD-MCNC: 16 G/DL (ref 14.2–18)
LYMPHOCYTES NFR BLD AUTO: 16.9 % (ref 20–45)
MCV RBC AUTO: 80 FL (ref 80–99)
MONOCYTES NFR BLD AUTO: 6.8 % (ref 1–10)
NEUTROPHILS NFR BLD AUTO: 76 % (ref 45–75)
PLATELET # BLD: 146 K/UL (ref 150–450)
POTASSIUM SERPL-SCNC: 3.6 MMOL/L (ref 3.5–5.1)
RBC # BLD AUTO: 5.94 M/UL (ref 4.7–6.1)
SODIUM SERPL-SCNC: 141 MMOL/L (ref 136–145)
WBC # BLD AUTO: 14.3 K/UL (ref 4.8–10.8)

## 2019-06-21 RX ADMIN — ZOLPIDEM TARTRATE PRN MG: 5 TABLET ORAL at 22:23

## 2019-06-21 RX ADMIN — DOCUSATE SODIUM SCH MG: 100 CAPSULE, LIQUID FILLED ORAL at 18:36

## 2019-06-21 RX ADMIN — DEXTROSE MONOHYDRATE SCH MLS/HR: 50 INJECTION, SOLUTION INTRAVENOUS at 21:22

## 2019-06-21 RX ADMIN — DOCUSATE SODIUM SCH MG: 100 CAPSULE, LIQUID FILLED ORAL at 13:34

## 2019-06-21 RX ADMIN — THEOPHYLLINE ANHYDROUS SCH MG: 100 CAPSULE, EXTENDED RELEASE ORAL at 09:39

## 2019-06-21 RX ADMIN — TRAZODONE HYDROCHLORIDE SCH MG: 50 TABLET ORAL at 20:31

## 2019-06-21 RX ADMIN — TRAZODONE HYDROCHLORIDE SCH MG: 50 TABLET ORAL at 20:25

## 2019-06-21 RX ADMIN — HEPARIN SODIUM SCH UNITS: 5000 INJECTION INTRAVENOUS; SUBCUTANEOUS at 09:42

## 2019-06-21 RX ADMIN — THEOPHYLLINE ANHYDROUS SCH MG: 100 CAPSULE, EXTENDED RELEASE ORAL at 18:35

## 2019-06-21 RX ADMIN — METHOCARBAMOL SCH MG: 500 TABLET, FILM COATED ORAL at 18:35

## 2019-06-21 RX ADMIN — DOCUSATE SODIUM SCH MG: 100 CAPSULE, LIQUID FILLED ORAL at 09:38

## 2019-06-21 RX ADMIN — DEXTROSE MONOHYDRATE SCH MLS/HR: 50 INJECTION, SOLUTION INTRAVENOUS at 13:49

## 2019-06-21 RX ADMIN — Medication SCH MG: at 09:39

## 2019-06-21 RX ADMIN — METHOCARBAMOL SCH MG: 500 TABLET, FILM COATED ORAL at 09:39

## 2019-06-21 RX ADMIN — ZOLPIDEM TARTRATE PRN MG: 5 TABLET ORAL at 21:22

## 2019-06-21 RX ADMIN — METHOCARBAMOL SCH MG: 500 TABLET, FILM COATED ORAL at 13:34

## 2019-06-21 RX ADMIN — DEXTROSE MONOHYDRATE SCH MLS/HR: 50 INJECTION, SOLUTION INTRAVENOUS at 05:41

## 2019-06-21 RX ADMIN — HEPARIN SODIUM SCH UNITS: 5000 INJECTION INTRAVENOUS; SUBCUTANEOUS at 20:27

## 2019-06-21 NOTE — NUR
NURSE NOTES:

Opened pt medication to administer at bedside and pt refused the medication. will waste the 
medication, charge nurse made aware.

## 2019-06-21 NOTE — NUR
NURSE NOTES:

Patient is awake and alert,respirations unlabored.Patient sitting up in bed and eating 
breakfast.Call light within reach.

## 2019-06-21 NOTE — NUR
NURSE NOTES:

Pt received Ambien 5mg at 9:20pm and pharmacy said it is okay to give the X1 dose an hour 
later, now giving the patient the X1 dose of 5mg.

## 2019-06-21 NOTE — NUR
NURSE NOTES:No complaints at this time.call light within reach.

 Patient resting and watching TV,respirations unlabored.

## 2019-06-21 NOTE — GENERAL PROGRESS NOTE
Assessment/Plan


Problem List:  


(1) Insomnia


ICD Codes:  G47.00 - Insomnia, unspecified


SNOMED:  799781264


(2) Acute exacerbation of chronic obstructive pulmonary disease (COPD)


ICD Codes:  J44.1 - Chronic obstructive pulmonary disease with (acute) 

exacerbation


SNOMED:  016091394


(3) Emphysema lung


ICD Codes:  J43.9 - Emphysema, unspecified


SNOMED:  18323951


Status:  stable, progressing


Assessment/Plan:


o2 pulm tx abx prn taper off steroids cbc bmp am dc plan w hh





Subjective


Constitutional:  Reports: weakness


Respiratory:  Reports: shortness of breath


Allergies:  


Coded Allergies:  


     Tuna (Verified  Allergy, Unknown, 6/18/19)


All Systems:  reviewed and negative except above


Subjective


calm in bed eating





Objective





Last 24 Hour Vital Signs








  Date Time  Temp Pulse Resp B/P (MAP) Pulse Ox O2 Delivery O2 Flow Rate FiO2


 


6/21/19 08:30  80 18  95 Room Air  21


 


6/21/19 04:00 97.7 71 18 130/82 (98) 94   


 


6/21/19 00:00 97.0 65 20 129/88 (102) 95   


 


6/20/19 21:15 97.7 75 20 147/87 (107) 95   


 


6/20/19 21:00      Room Air  


 


6/20/19 20:41  77 18  94 Room Air  21


 


6/20/19 20:00 97.7 75 20 147/87 (107) 95   


 


6/20/19 16:00 98.1 88 19 125/80 (95) 98   

















Intake and Output  


 


 6/20/19 6/21/19





 19:00 07:00


 


Intake Total  750.0 ml


 


Output Total 900 ml 400 ml


 


Balance -900 ml 350.0 ml


 


  


 


Intake Oral  640 ml


 


IV Total  110.0 ml


 


Output Urine Total 900 ml 400 ml


 


# Voids  2








Laboratory Tests


6/21/19 07:35: 


White Blood Count 14.3H, Red Blood Count 5.94, Hemoglobin 16.0, Hematocrit 47.3

, Mean Corpuscular Volume 80, Mean Corpuscular Hemoglobin 26.9L, Mean 

Corpuscular Hemoglobin Concent 33.8, Red Cell Distribution Width 12.2, Platelet 

Count 146L, Mean Platelet Volume 8.4, Neutrophils (%) (Auto) 76.0H, Lymphocytes 

(%) (Auto) 16.9L, Monocytes (%) (Auto) 6.8, Eosinophils (%) (Auto) 0.1, 

Basophils (%) (Auto) 0.2, Sodium Level 141, Potassium Level 3.6, Chloride Level 

103, Carbon Dioxide Level 28, Anion Gap 10, Blood Urea Nitrogen 17, Creatinine 

1.0, Estimat Glomerular Filtration Rate > 60, Glucose Level 87, Calcium Level 

9.3


Height (Feet):  6


Height (Inches):  2.00


Weight (Pounds):  189


General Appearance:  alert


EENT:  normal ENT inspection


Neck:  normal alignment


Cardiovascular:  normal peripheral pulses, normal rate, regular rhythm


Respiratory/Chest:  chest wall non-tender, lungs clear, expiratory wheezing


Abdomen:  normal bowel sounds, non tender, soft


Extremities:  normal inspection


Edema:  no edema noted Arm (L), no edema noted Arm (R), no edema noted Leg (L), 

no edema noted Leg (R), no edema noted Pedal (L), no edema noted Pedal (R), no 

edema noted Generalized


Neurologic:  responsive, motor weakness


Skin:  normal pigmentation, warm/dry











Juan M Uriarte DO Jun 21, 2019 12:42

## 2019-06-21 NOTE — PULMONOLOGY PROGRESS NOTE
Assessment/Plan


Problems:  


(1) Acute exacerbation of chronic obstructive pulmonary disease (COPD)


(2) Constipation


(3) Emphysema lung


Assessment/Plan


improving


large amount of sputum 


sputum and blood cultures are negative


respiratory treatment


titrate fio2 to sat of 92%


symptomatic treatment


dvt prophylaxis.





Subjective


ROS Limited/Unobtainable:  No


Constitutional:  Reports: no symptoms


HEENT:  Repors: no symptoms


Allergies:  


Coded Allergies:  


     Tuna (Verified  Allergy, Unknown, 6/18/19)





Objective





Last 24 Hour Vital Signs








  Date Time  Temp Pulse Resp B/P (MAP) Pulse Ox O2 Delivery O2 Flow Rate FiO2


 


6/21/19 08:30  80 18  95 Room Air  21


 


6/21/19 04:00 97.7 71 18 130/82 (98) 94   


 


6/21/19 00:00 97.0 65 20 129/88 (102) 95   


 


6/20/19 21:15 97.7 75 20 147/87 (107) 95   


 


6/20/19 21:00      Room Air  


 


6/20/19 20:41  77 18  94 Room Air  21


 


6/20/19 20:00 97.7 75 20 147/87 (107) 95   


 


6/20/19 16:00 98.1 88 19 125/80 (95) 98   

















Intake and Output  


 


 6/20/19 6/21/19





 19:00 07:00


 


Intake Total  750.0 ml


 


Output Total 900 ml 400 ml


 


Balance -900 ml 350.0 ml


 


  


 


Intake Oral  640 ml


 


IV Total  110.0 ml


 


Output Urine Total 900 ml 400 ml


 


# Voids  2








Objective


General Appearance:  WD/WN


HEENT:  normocephalic, atraumatic


Respiratory/Chest:  chest wall non-tender, rhonchi


Cardiovascular:  normal peripheral pulses, normal rate


Abdomen:  normal bowel sounds, soft, non tender


Genitourinary:  normal external genitalia


Extremities:  no clubbing


Skin:  no rash





Microbiology








 Date/Time


Source Procedure


Growth Status


 


 


 6/19/19 05:15


Sputum Gram Stain - Final Complete


 


 6/19/19 05:15


Sputum Sputum Culture - Final


NORMAL UPPER RESPIRATORY LÓPEZ AT 48 ... Complete








Laboratory Tests


6/21/19 07:35: 


White Blood Count 14.3H, Red Blood Count 5.94, Hemoglobin 16.0, Hematocrit 47.3

, Mean Corpuscular Volume 80, Mean Corpuscular Hemoglobin 26.9L, Mean 

Corpuscular Hemoglobin Concent 33.8, Red Cell Distribution Width 12.2, Platelet 

Count 146L, Mean Platelet Volume 8.4, Neutrophils (%) (Auto) 76.0H, Lymphocytes 

(%) (Auto) 16.9L, Monocytes (%) (Auto) 6.8, Eosinophils (%) (Auto) 0.1, 

Basophils (%) (Auto) 0.2, Sodium Level 141, Potassium Level 3.6, Chloride Level 

103, Carbon Dioxide Level 28, Anion Gap 10, Blood Urea Nitrogen 17, Creatinine 

1.0, Estimat Glomerular Filtration Rate > 60, Glucose Level 87, Calcium Level 

9.3





Current Medications








 Medications


  (Trade)  Dose


 Ordered  Sig/Zana


 Route


 PRN Reason  Start Time


 Stop Time Status Last Admin


Dose Admin


 


 Albuterol/


 Ipratropium


  (Albuterol/


 Ipratropium)  3 ml  Q4H  PRN


 HHN


 dyspnea  6/18/19 16:00


 6/23/19 15:59  6/19/19 02:44


 


 


 Cyclobenzaprine


 HCl


  (Flexeril)  10 mg  QHS  PRN


 ORAL


 For Pain  6/18/19 16:00


 7/18/19 15:59   


 


 


 Dextrose


  (Dextrose 50%)  25 ml  Q30M  PRN


 IV


 Hypoglycemia  6/18/19 16:00


 7/18/19 15:59   


 


 


 Dextrose


  (Dextrose 50%)  50 ml  Q30M  PRN


 IV


 Hypoglycemia  6/18/19 16:00


 7/18/19 15:59   


 


 


 Docusate Sodium


  (Colace)  100 mg  THREE TIMES A  DAY


 ORAL


   6/19/19 18:00


 7/19/19 17:59  6/21/19 09:38


 


 


 Heparin Sodium


  (Porcine)


  (Heparin 5000


 units/ml)  5,000 units  EVERY 12  HOURS


 SUBQ


   6/18/19 21:00


 7/18/19 20:59  6/21/19 09:42


 


 


 Lorazepam


  (Ativan 2mg/ml


 1ml)  0.5 mg  Q4H  PRN


 IV


 For Anxiety  6/18/19 16:00


 6/25/19 15:59   


 


 


 Methocarbamol


  (Robaxin)  500 mg  TID


 ORAL


   6/18/19 19:00


 7/18/19 18:59  6/21/19 09:39


 


 


 Mineral Oil


  (Fleet's Mineral


 Oil Enema)  133 ml  EVERY OTHER  DAY


 RECTAL


   6/21/19 09:00


 7/21/19 08:59   


 


 


 Morphine Sulfate


  (Morphine


 Sulfate)  2 mg  Q4H  PRN


 IVP


 severe pain 7-10  6/18/19 16:00


 6/25/19 15:59   


 


 


 Nitroglycerin


  (Ntg)  0.4 mg  Q5M X 3 DOSES PRN


 SL


 Prn Chest Pain  6/18/19 16:00


 7/18/19 15:59   


 


 


 Ondansetron HCl


  (Zofran)  4 mg  Q6H  PRN


 IVP


 Nausea & Vomiting  6/18/19 16:00


 7/18/19 15:59   


 


 


 Oxycodone/


 Acetaminophen


  (Percocet 5-325)  1 tab  Q6H  PRN


 ORAL


 Moderate Pain (Pain Scale 4-6)  6/18/19 16:00


 6/25/19 15:59   


 


 


 Piperacillin Sod/


 Tazobactam Sod


 3.375 gm/Sodium


 Chloride  110 ml @ 


 27.5 mls/hr  EVERY 8  HOURS


 IVPB


   6/18/19 22:00


 6/23/19 21:59  6/21/19 05:41


 


 


 Polyethylene


 Glycol


  (Miralax)  17 gm  BEDTIME


 ORAL


   6/19/19 21:00


 7/19/19 20:59   


 


 


 Promethazine HCl/


 Codeine


  (Phenergan with


 Codeine)  5 ml  Q6H  PRN


 ORAL


 cough  6/18/19 16:00


 7/18/19 15:59  6/19/19 23:33


 


 


 Quetiapine


 Fumarate


  (SEROquel)  100 mg  QHS


 ORAL


   6/18/19 21:00


 7/18/19 20:59  6/18/19 21:24


 


 


 Sennosides


  (Senokot)  8.6 mg  DAILY


 ORAL


   6/20/19 09:00


 7/20/19 08:59  6/21/19 09:39


 


 


 Theophylline


  (Julien-Dur)  100 mg  TWICE A  DAY


 ORAL


   6/18/19 19:30


 7/18/19 19:29  6/21/19 09:39


 


 


 Trazodone HCl


  (Desyrel)  50 mg  BEDTIME


 ORAL


   6/18/19 21:00


 7/18/19 20:59  6/18/19 21:23


 


 


 Zolpidem Tartrate


  (Ambien)  5 mg  HSPRN  PRN


 ORAL


 Insomnia  6/20/19 12:45


 6/27/19 12:44  6/20/19 21:57


 

















Toan Norman MD Jun 21, 2019 13:11

## 2019-06-21 NOTE — NUR
DISCHARGE PLANNING: NOTE 



CLINICALS FAXED TO [facility] 

-------------------------------------------------------------------------------

Addendum: 06/21/19 at 1540 by Larisa Villalta CM

-------------------------------------------------------------------------------

ROXANE SAWANT AT [facility] SHE IS ACCEPTING THIS PATIENT FOR  ONCE HE IS READY FOR DC.

## 2019-06-21 NOTE — NUR
NURSE NOTES:

Pt received in bed alert and oriented, able to make needs, no c/o pain, no signs of 
distress, questions regarding ambien, head of bed elevated, will continue to monitor.

## 2019-06-22 VITALS — SYSTOLIC BLOOD PRESSURE: 118 MMHG | DIASTOLIC BLOOD PRESSURE: 79 MMHG

## 2019-06-22 VITALS — SYSTOLIC BLOOD PRESSURE: 118 MMHG | DIASTOLIC BLOOD PRESSURE: 77 MMHG

## 2019-06-22 VITALS — SYSTOLIC BLOOD PRESSURE: 111 MMHG | DIASTOLIC BLOOD PRESSURE: 72 MMHG

## 2019-06-22 LAB
ADD MANUAL DIFF: NO
ANION GAP SERPL CALC-SCNC: 10 MMOL/L (ref 5–15)
BASOPHILS NFR BLD AUTO: 0.5 % (ref 0–2)
BUN SERPL-MCNC: 18 MG/DL (ref 7–18)
CALCIUM SERPL-MCNC: 8.5 MG/DL (ref 8.5–10.1)
CHLORIDE SERPL-SCNC: 102 MMOL/L (ref 98–107)
CO2 SERPL-SCNC: 28 MMOL/L (ref 21–32)
CREAT SERPL-MCNC: 1 MG/DL (ref 0.55–1.3)
EOSINOPHIL NFR BLD AUTO: 2.7 % (ref 0–3)
ERYTHROCYTE [DISTWIDTH] IN BLOOD BY AUTOMATED COUNT: 12.4 % (ref 11.6–14.8)
HCT VFR BLD CALC: 44.5 % (ref 42–52)
HGB BLD-MCNC: 15 G/DL (ref 14.2–18)
LYMPHOCYTES NFR BLD AUTO: 25.1 % (ref 20–45)
MCV RBC AUTO: 81 FL (ref 80–99)
MONOCYTES NFR BLD AUTO: 9.3 % (ref 1–10)
NEUTROPHILS NFR BLD AUTO: 62.4 % (ref 45–75)
PLATELET # BLD: 134 K/UL (ref 150–450)
POTASSIUM SERPL-SCNC: 3.5 MMOL/L (ref 3.5–5.1)
RBC # BLD AUTO: 5.51 M/UL (ref 4.7–6.1)
SODIUM SERPL-SCNC: 140 MMOL/L (ref 136–145)
WBC # BLD AUTO: 7.8 K/UL (ref 4.8–10.8)

## 2019-06-22 RX ADMIN — THEOPHYLLINE ANHYDROUS SCH MG: 100 CAPSULE, EXTENDED RELEASE ORAL at 09:00

## 2019-06-22 RX ADMIN — METHOCARBAMOL SCH MG: 500 TABLET, FILM COATED ORAL at 09:00

## 2019-06-22 RX ADMIN — HEPARIN SODIUM SCH UNITS: 5000 INJECTION INTRAVENOUS; SUBCUTANEOUS at 09:01

## 2019-06-22 RX ADMIN — DEXTROSE MONOHYDRATE SCH MLS/HR: 50 INJECTION, SOLUTION INTRAVENOUS at 05:09

## 2019-06-22 RX ADMIN — Medication SCH MG: at 08:59

## 2019-06-22 RX ADMIN — DOCUSATE SODIUM SCH MG: 100 CAPSULE, LIQUID FILLED ORAL at 09:00

## 2019-06-22 NOTE — NUR
PT NOTE:

Pt refused to participate in PT treatment session due to feeling independent with 

therapeutic exercises. States will be discharged this morning. Pt educated on the importance

of performing exercises for ease of functional ADLs. Left nurse call light within easy 
reach.

Notified nurse.

## 2019-06-22 NOTE — PULMONOLOGY PROGRESS NOTE
Assessment/Plan


Problems:  


(1) Acute exacerbation of chronic obstructive pulmonary disease (COPD)


(2) Constipation


(3) Emphysema lung


Assessment/Plan


improving, no new complains,


large amount of sputum 


sputum and blood cultures are negative


respiratory treatment


titrate fio2 to sat of 92%


symptomatic treatment


dvt prophylaxis.





Subjective


ROS Limited/Unobtainable:  No


Constitutional:  Reports: no symptoms


HEENT:  Repors: no symptoms


Respiratory:  Reports: no symptoms


Allergies:  


Coded Allergies:  


     Tuna (Verified  Allergy, Unknown, 6/18/19)





Objective





Last 24 Hour Vital Signs








  Date Time  Temp Pulse Resp B/P (MAP) Pulse Ox O2 Delivery O2 Flow Rate FiO2


 


6/22/19 04:00 98.2 77 20 118/77 (91) 97   


 


6/22/19 00:00 98.5 74 20 118/79 (92) 96   


 


6/21/19 22:52  78 16  95 Room Air  21


 


6/21/19 21:00      Room Air  


 


6/21/19 20:00 98.3 72 19 116/77 (90) 94   


 


6/21/19 17:32 98.5 72 18 127/90 (102) 95   


 


6/21/19 12:00 97.9 69 18 117/73 (88) 94   


 


6/21/19 09:00      Room Air  


 


6/21/19 08:30  80 18  95 Room Air  21

















Intake and Output  


 


 6/21/19 6/22/19





 19:00 07:00


 


Intake Total 1200 ml 110.0 ml


 


Output Total 825 ml 


 


Balance 375 ml 110.0 ml


 


  


 


Intake Oral 1200 ml 


 


IV Total  110.0 ml


 


Output Urine Total 825 ml 


 


# Voids  3








Objective


General Appearance:  WD/WN


HEENT:  normocephalic, atraumatic


Respiratory/Chest:  chest wall non-tender, rhonchi


Cardiovascular:  normal peripheral pulses, normal rate


Abdomen:  normal bowel sounds, soft, non tender


Genitourinary:  normal external genitalia


Extremities:  no clubbing


Skin:  no rash


Laboratory Tests


6/21/19 07:35: 


White Blood Count 14.3H, Red Blood Count 5.94, Hemoglobin 16.0, Hematocrit 47.3

, Mean Corpuscular Volume 80, Mean Corpuscular Hemoglobin 26.9L, Mean 

Corpuscular Hemoglobin Concent 33.8, Red Cell Distribution Width 12.2, Platelet 

Count 146L, Mean Platelet Volume 8.4, Neutrophils (%) (Auto) 76.0H, Lymphocytes 

(%) (Auto) 16.9L, Monocytes (%) (Auto) 6.8, Eosinophils (%) (Auto) 0.1, 

Basophils (%) (Auto) 0.2, Sodium Level 141, Potassium Level 3.6, Chloride Level 

103, Carbon Dioxide Level 28, Anion Gap 10, Blood Urea Nitrogen 17, Creatinine 

1.0, Estimat Glomerular Filtration Rate > 60, Glucose Level 87, Calcium Level 

9.3





Current Medications








 Medications


  (Trade)  Dose


 Ordered  Sig/Zana


 Route


 PRN Reason  Start Time


 Stop Time Status Last Admin


Dose Admin


 


 Albuterol/


 Ipratropium


  (Albuterol/


 Ipratropium)  3 ml  Q4H  PRN


 HHN


 dyspnea  6/18/19 16:00


 6/23/19 15:59  6/19/19 02:44


 


 


 Cyclobenzaprine


 HCl


  (Flexeril)  10 mg  QHS  PRN


 ORAL


 For Pain  6/18/19 16:00


 7/18/19 15:59   


 


 


 Dextrose


  (Dextrose 50%)  25 ml  Q30M  PRN


 IV


 Hypoglycemia  6/18/19 16:00


 7/18/19 15:59   


 


 


 Dextrose


  (Dextrose 50%)  50 ml  Q30M  PRN


 IV


 Hypoglycemia  6/18/19 16:00


 7/18/19 15:59   


 


 


 Docusate Sodium


  (Colace)  100 mg  THREE TIMES A  DAY


 ORAL


   6/19/19 18:00


 7/19/19 17:59  6/21/19 18:36


 


 


 Heparin Sodium


  (Porcine)


  (Heparin 5000


 units/ml)  5,000 units  EVERY 12  HOURS


 SUBQ


   6/18/19 21:00


 7/18/19 20:59  6/21/19 20:27


 


 


 Methocarbamol


  (Robaxin)  500 mg  TID


 ORAL


   6/18/19 19:00


 7/18/19 18:59  6/21/19 18:35


 


 


 Mineral Oil


  (Fleet's Mineral


 Oil Enema)  133 ml  EVERY OTHER  DAY


 RECTAL


   6/21/19 09:00


 7/21/19 08:59   


 


 


 Piperacillin Sod/


 Tazobactam Sod


 3.375 gm/Sodium


 Chloride  110 ml @ 


 27.5 mls/hr  EVERY 8  HOURS


 IVPB


   6/18/19 22:00


 6/23/19 21:59  6/22/19 05:09


 


 


 Promethazine HCl/


 Codeine


  (Phenergan with


 Codeine)  5 ml  Q6H  PRN


 ORAL


 cough  6/18/19 16:00


 7/18/19 15:59  6/19/19 23:33


 


 


 Quetiapine


 Fumarate


  (SEROquel)  100 mg  QHS


 ORAL


   6/18/19 21:00


 7/18/19 20:59  6/18/19 21:24


 


 


 Sennosides


  (Senokot)  8.6 mg  DAILY


 ORAL


   6/20/19 09:00


 7/20/19 08:59  6/21/19 09:39


 


 


 Theophylline


  (Julien-Dur)  100 mg  TWICE A  DAY


 ORAL


   6/18/19 19:30


 7/18/19 19:29  6/21/19 18:35


 


 


 Trazodone HCl


  (Desyrel)  50 mg  BEDTIME


 ORAL


   6/18/19 21:00


 7/18/19 20:59  6/18/19 21:23


 


 


 Zolpidem Tartrate


  (Ambien)  5 mg  HSPRN  PRN


 ORAL


 Insomnia  6/20/19 12:45


 6/27/19 12:44  6/21/19 22:23


 

















Toan Norman MD Jun 22, 2019 07:13

## 2019-06-22 NOTE — NUR
NURSE NOTES:



Discharge instruction was given. All belongings, taxi voucher was given. IV line, arm band 
removed. Patient discharged in stable condition.

## 2019-06-22 NOTE — GENERAL PROGRESS NOTE
Assessment/Plan


Problem List:  


(1) Insomnia


ICD Codes:  G47.00 - Insomnia, unspecified


SNOMED:  056345765


(2) Acute exacerbation of chronic obstructive pulmonary disease (COPD)


ICD Codes:  J44.1 - Chronic obstructive pulmonary disease with (acute) 

exacerbation


SNOMED:  797659787


(3) Emphysema lung


ICD Codes:  J43.9 - Emphysema, unspecified


SNOMED:  24994122


Status:  stable, progressing


Assessment/Plan:


o2 pulm tx abx prn taper off steroids cbc bmp am dc  w hh if clear





Subjective


Allergies:  


Coded Allergies:  


     Tuna (Verified  Allergy, Unknown, 6/18/19)


All Systems:  reviewed and negative except above


Subjective


calm in bed





Objective





Last 24 Hour Vital Signs








  Date Time  Temp Pulse Resp B/P (MAP) Pulse Ox O2 Delivery O2 Flow Rate FiO2


 


6/22/19 04:00 98.2 77 20 118/77 (91) 97   


 


6/22/19 00:00 98.5 74 20 118/79 (92) 96   


 


6/21/19 22:52  78 16  95 Room Air  21


 


6/21/19 21:00      Room Air  


 


6/21/19 20:00 98.3 72 19 116/77 (90) 94   


 


6/21/19 17:32 98.5 72 18 127/90 (102) 95   


 


6/21/19 12:00 97.9 69 18 117/73 (88) 94   


 


6/21/19 09:00      Room Air  


 


6/21/19 08:30  80 18  95 Room Air  21

















Intake and Output  


 


 6/21/19 6/22/19





 19:00 07:00


 


Intake Total 1200 ml 110.0 ml


 


Output Total 825 ml 


 


Balance 375 ml 110.0 ml


 


  


 


Intake Oral 1200 ml 


 


IV Total  110.0 ml


 


Output Urine Total 825 ml 


 


# Voids  3








Height (Feet):  6


Height (Inches):  2.00


Weight (Pounds):  189


General Appearance:  alert


EENT:  normal ENT inspection


Neck:  normal alignment


Cardiovascular:  normal peripheral pulses, normal rate, regular rhythm


Respiratory/Chest:  chest wall non-tender, lungs clear, normal breath sounds


Abdomen:  normal bowel sounds, non tender, soft


Extremities:  normal inspection


Edema:  no edema noted Arm (L), no edema noted Arm (R), no edema noted Leg (L), 

no edema noted Leg (R), no edema noted Pedal (L), no edema noted Pedal (R), no 

edema noted Generalized


Neurologic:  responsive, motor weakness


Skin:  normal pigmentation, warm/dry











Juan M Uriarte DO Jun 22, 2019 07:42

## 2019-06-22 NOTE — NUR
NURSE NOTES:



Received report from DARA Thompson. Rounding done with outgoing nurse. Patient a/o x4 having 
breakfast. No respiratory discomfort noted. Denies any pain at this time. Questions 
answered. Zosyn is running at this time. Bed in lowest position, call light within reach. 
Will continue to monitor.

## 2019-06-26 NOTE — DISCHARGE SUMMARY
Discharge Summary


Discharge Summary


_


DATE OF ADMISSION: 6/18/2019





DATE OF DISCHARGE 6/22/2019





DISCHARGED BY: Dr Uriarte





REASON FOR ADMISSION: 


64 years old male with past medical history of COPD, HCV, presented with 

ongoing   productive cough for the last 3 weeks.  


Patient reported , that he saw his primary care provider and finished a course 

of azithromycin last week.  


Patient was also using Spiriva and Advair as directed.  


Patient continued to have shortness of breath.  


He reported that his symptoms worse on exertion.  


Cough productive with   thick greenish sputum.  


He denied fever and chills.  


He denied nausea and vomiting.  


Upon evaluation patient was afebrile , vital signs  were stable.  


Laboratory work-up revealed no leukocytosis , stable hemoglobin and  

hematocrit.  Platelet count 134.  


Stable renal parameters  and  electrolytes .


Lactic acid 1.6. 


 Stable LFT.  


Troponin level 0.017.  Albumin 3.8.


EKG revealed sinus rhythm , no acute ischemic changes .


Chest x-ray demonstrated hyperinflated lungs , consistent with  COPD,  but no 

acute cardiopulmonary pathology.


In the emergency department patient started on steroids and broad-spectrum 

antibiotic .


Patient continued to have diffuse expiratory wheezes and irritable cough with 

shortness of breath.


Patient subsequently admitted for further evaluation and management. 








CONSULTANTS:


pulmonary Dr. Norman


 


 


[facility] COURSE: 


Patient admitted to medical surgical floor.


Supplemental oxygen provided as needed  to keep pulse oximetry above 90%. 


Pulmonary toilet provided  with nebulizing therapy around the clock and as 

needed.


Patient was started on intravenous steroids with gradual tapering down. 


 


Patient was started on empiric antibiotic.


Blood culture and sputum culture were negative.


CXR revealed no acute cardiopulmonary pathology, but was consistent with COPD.


Trial of theophylline started.


Antitussive provided as needed.


DVT prophylaxis provided.


Pain management was addressed as needed.


Bowel regimen instituted.


Supportive care provided.  





Patient clinically stabilized and was ready for discharge home.  


Home health was arranged.


 





FINAL DIAGNOSES: 


Acute COPD exacerbation


Emphysema   lung


Constipation





DISCHARGE MEDICATIONS:


See Medication Reconciliation list.





DISCHARGE INSTRUCTIONS:





Patient was discharged home with home health services.  


Follow up with primary care provider in one week.








I have been assigned to dictate discharge summary for this account.


I was not involved in the patient's management.











Yoana Marroquin NP Jun 26, 2019 12:36 How Severe Is This Condition?: mild

## 2019-09-18 ENCOUNTER — HOSPITAL ENCOUNTER (OUTPATIENT)
Dept: HOSPITAL 72 - LAB | Age: 64
Discharge: HOME | End: 2019-09-18
Payer: MEDICARE

## 2019-09-18 DIAGNOSIS — R05: Primary | ICD-10-CM

## 2019-09-18 LAB
ADD MANUAL DIFF: NO
ANION GAP SERPL CALC-SCNC: 7 MMOL/L (ref 5–15)
BASOPHILS NFR BLD AUTO: 0.6 % (ref 0–2)
BUN SERPL-MCNC: 15 MG/DL (ref 7–18)
CALCIUM SERPL-MCNC: 9.9 MG/DL (ref 8.5–10.1)
CHLORIDE SERPL-SCNC: 105 MMOL/L (ref 98–107)
CO2 SERPL-SCNC: 30 MMOL/L (ref 21–32)
CREAT SERPL-MCNC: 1.1 MG/DL (ref 0.55–1.3)
EOSINOPHIL NFR BLD AUTO: 1.1 % (ref 0–3)
ERYTHROCYTE [DISTWIDTH] IN BLOOD BY AUTOMATED COUNT: 12.7 % (ref 11.6–14.8)
HCT VFR BLD CALC: 50 % (ref 42–52)
HGB BLD-MCNC: 16.1 G/DL (ref 14.2–18)
LYMPHOCYTES NFR BLD AUTO: 29.2 % (ref 20–45)
MCV RBC AUTO: 84 FL (ref 80–99)
MONOCYTES NFR BLD AUTO: 9.7 % (ref 1–10)
NEUTROPHILS NFR BLD AUTO: 59.4 % (ref 45–75)
PLATELET # BLD: 160 K/UL (ref 150–450)
POTASSIUM SERPL-SCNC: 4.2 MMOL/L (ref 3.5–5.1)
RBC # BLD AUTO: 5.97 M/UL (ref 4.7–6.1)
SODIUM SERPL-SCNC: 142 MMOL/L (ref 136–145)
WBC # BLD AUTO: 7.1 K/UL (ref 4.8–10.8)

## 2019-09-18 PROCEDURE — 80048 BASIC METABOLIC PNL TOTAL CA: CPT

## 2019-09-18 PROCEDURE — 71046 X-RAY EXAM CHEST 2 VIEWS: CPT

## 2019-09-18 PROCEDURE — 85025 COMPLETE CBC W/AUTO DIFF WBC: CPT

## 2019-09-18 PROCEDURE — 36415 COLL VENOUS BLD VENIPUNCTURE: CPT

## 2019-09-18 NOTE — DIAGNOSTIC IMAGING REPORT
Indication: Cough

 

Technique: PA and lateral views of the chest.

 

Comparison: 6/18/2019

 

Findings: There is hyperinflation with flattening the diaphragms suggesting COPD.

Heart size within normal limits. Mediastinal contours are sharp. There is mild

prominence of the coy likely related to prominence of the bilateral pulmonary

arteries. There is no definite focal airspace consolidation, pleural effusion or

pneumothorax. Some scarring is noted in the bilateral apices. There are degenerative

changes of the spine. No acute osseous abnormality.

 

IMPRESSION: 

Findings suggestive of COPD.

 

Focal airspace consolidation, pleural effusion, pneumothorax or radiographic evidence

of pulmonary edema

 

Bilateral hilar prominence, possibly related to ectatic pulmonary arteries.

Adenopathy or hilar mass felt less likely but not entirely excluded. Recommend

further evaluation with CT of the chest with contrast.

## 2019-11-13 ENCOUNTER — HOSPITAL ENCOUNTER (INPATIENT)
Dept: HOSPITAL 72 - EMR | Age: 64
LOS: 5 days | Discharge: NURSING, REHAB OR LONG-TERM CARE | DRG: 193 | End: 2019-11-18
Payer: MEDICARE

## 2019-11-13 VITALS — DIASTOLIC BLOOD PRESSURE: 88 MMHG | SYSTOLIC BLOOD PRESSURE: 117 MMHG

## 2019-11-13 VITALS — HEIGHT: 74 IN | BODY MASS INDEX: 24.54 KG/M2 | WEIGHT: 191.25 LBS

## 2019-11-13 VITALS — SYSTOLIC BLOOD PRESSURE: 127 MMHG | DIASTOLIC BLOOD PRESSURE: 60 MMHG

## 2019-11-13 VITALS — DIASTOLIC BLOOD PRESSURE: 75 MMHG | SYSTOLIC BLOOD PRESSURE: 132 MMHG

## 2019-11-13 VITALS — SYSTOLIC BLOOD PRESSURE: 124 MMHG | DIASTOLIC BLOOD PRESSURE: 89 MMHG

## 2019-11-13 DIAGNOSIS — M62.82: ICD-10-CM

## 2019-11-13 DIAGNOSIS — D69.6: ICD-10-CM

## 2019-11-13 DIAGNOSIS — E87.1: ICD-10-CM

## 2019-11-13 DIAGNOSIS — G47.00: ICD-10-CM

## 2019-11-13 DIAGNOSIS — I27.20: ICD-10-CM

## 2019-11-13 DIAGNOSIS — N17.0: ICD-10-CM

## 2019-11-13 DIAGNOSIS — J41.1: ICD-10-CM

## 2019-11-13 DIAGNOSIS — F19.10: ICD-10-CM

## 2019-11-13 DIAGNOSIS — Z23: ICD-10-CM

## 2019-11-13 DIAGNOSIS — Z87.891: ICD-10-CM

## 2019-11-13 DIAGNOSIS — J43.9: ICD-10-CM

## 2019-11-13 DIAGNOSIS — J18.9: Primary | ICD-10-CM

## 2019-11-13 DIAGNOSIS — E87.6: ICD-10-CM

## 2019-11-13 LAB
ADD MANUAL DIFF: NO
ALBUMIN SERPL-MCNC: 4 G/DL (ref 3.4–5)
ALBUMIN/GLOB SERPL: 1 {RATIO} (ref 1–2.7)
ALP SERPL-CCNC: 63 U/L (ref 46–116)
ALT SERPL-CCNC: 45 U/L (ref 12–78)
ANION GAP SERPL CALC-SCNC: 7 MMOL/L (ref 5–15)
AST SERPL-CCNC: 68 U/L (ref 15–37)
BASOPHILS NFR BLD AUTO: 1.8 % (ref 0–2)
BILIRUB SERPL-MCNC: 0.7 MG/DL (ref 0.2–1)
BUN SERPL-MCNC: 21 MG/DL (ref 7–18)
CALCIUM SERPL-MCNC: 8.7 MG/DL (ref 8.5–10.1)
CHLORIDE SERPL-SCNC: 91 MMOL/L (ref 98–107)
CO2 SERPL-SCNC: 35 MMOL/L (ref 21–32)
CREAT SERPL-MCNC: 1.5 MG/DL (ref 0.55–1.3)
EOSINOPHIL NFR BLD AUTO: 0.1 % (ref 0–3)
ERYTHROCYTE [DISTWIDTH] IN BLOOD BY AUTOMATED COUNT: 10.6 % (ref 11.6–14.8)
GLOBULIN SER-MCNC: 4.2 G/DL
HCT VFR BLD CALC: 50 % (ref 42–52)
HGB BLD-MCNC: 17.1 G/DL (ref 14.2–18)
LYMPHOCYTES NFR BLD AUTO: 10.9 % (ref 20–45)
MCV RBC AUTO: 81 FL (ref 80–99)
MONOCYTES NFR BLD AUTO: 10.3 % (ref 1–10)
NEUTROPHILS NFR BLD AUTO: 76.9 % (ref 45–75)
PLATELET # BLD: 121 K/UL (ref 150–450)
POTASSIUM SERPL-SCNC: 3.3 MMOL/L (ref 3.5–5.1)
RBC # BLD AUTO: 6.16 M/UL (ref 4.7–6.1)
SODIUM SERPL-SCNC: 133 MMOL/L (ref 136–145)
WBC # BLD AUTO: 8.1 K/UL (ref 4.8–10.8)

## 2019-11-13 PROCEDURE — 96365 THER/PROPH/DIAG IV INF INIT: CPT

## 2019-11-13 PROCEDURE — 82550 ASSAY OF CK (CPK): CPT

## 2019-11-13 PROCEDURE — 76770 US EXAM ABDO BACK WALL COMP: CPT

## 2019-11-13 PROCEDURE — 82043 UR ALBUMIN QUANTITATIVE: CPT

## 2019-11-13 PROCEDURE — 94640 AIRWAY INHALATION TREATMENT: CPT

## 2019-11-13 PROCEDURE — 94664 DEMO&/EVAL PT USE INHALER: CPT

## 2019-11-13 PROCEDURE — 87205 SMEAR GRAM STAIN: CPT

## 2019-11-13 PROCEDURE — 85007 BL SMEAR W/DIFF WBC COUNT: CPT

## 2019-11-13 PROCEDURE — 84550 ASSAY OF BLOOD/URIC ACID: CPT

## 2019-11-13 PROCEDURE — 36415 COLL VENOUS BLD VENIPUNCTURE: CPT

## 2019-11-13 PROCEDURE — 93306 TTE W/DOPPLER COMPLETE: CPT

## 2019-11-13 PROCEDURE — 81001 URINALYSIS AUTO W/SCOPE: CPT

## 2019-11-13 PROCEDURE — 86710 INFLUENZA VIRUS ANTIBODY: CPT

## 2019-11-13 PROCEDURE — 84484 ASSAY OF TROPONIN QUANT: CPT

## 2019-11-13 PROCEDURE — 84100 ASSAY OF PHOSPHORUS: CPT

## 2019-11-13 PROCEDURE — 80053 COMPREHEN METABOLIC PANEL: CPT

## 2019-11-13 PROCEDURE — 83880 ASSAY OF NATRIURETIC PEPTIDE: CPT

## 2019-11-13 PROCEDURE — 85651 RBC SED RATE NONAUTOMATED: CPT

## 2019-11-13 PROCEDURE — 80307 DRUG TEST PRSMV CHEM ANLYZR: CPT

## 2019-11-13 PROCEDURE — 93005 ELECTROCARDIOGRAM TRACING: CPT

## 2019-11-13 PROCEDURE — 87070 CULTURE OTHR SPECIMN AEROBIC: CPT

## 2019-11-13 PROCEDURE — 87040 BLOOD CULTURE FOR BACTERIA: CPT

## 2019-11-13 PROCEDURE — 71045 X-RAY EXAM CHEST 1 VIEW: CPT

## 2019-11-13 PROCEDURE — 83735 ASSAY OF MAGNESIUM: CPT

## 2019-11-13 PROCEDURE — 83935 ASSAY OF URINE OSMOLALITY: CPT

## 2019-11-13 PROCEDURE — 80061 LIPID PANEL: CPT

## 2019-11-13 PROCEDURE — 84300 ASSAY OF URINE SODIUM: CPT

## 2019-11-13 PROCEDURE — 80048 BASIC METABOLIC PNL TOTAL CA: CPT

## 2019-11-13 PROCEDURE — 86140 C-REACTIVE PROTEIN: CPT

## 2019-11-13 PROCEDURE — 89050 BODY FLUID CELL COUNT: CPT

## 2019-11-13 PROCEDURE — 99291 CRITICAL CARE FIRST HOUR: CPT

## 2019-11-13 PROCEDURE — 85025 COMPLETE CBC W/AUTO DIFF WBC: CPT

## 2019-11-13 PROCEDURE — 82977 ASSAY OF GGT: CPT

## 2019-11-13 PROCEDURE — 90732 PPSV23 VACC 2 YRS+ SUBQ/IM: CPT

## 2019-11-13 PROCEDURE — 96367 TX/PROPH/DG ADDL SEQ IV INF: CPT

## 2019-11-13 RX ADMIN — TRAZODONE HYDROCHLORIDE SCH MG: 100 TABLET ORAL at 21:47

## 2019-11-13 RX ADMIN — PROMETHAZINE HYDROCHLORIDE PRN ML: 6.25 SOLUTION ORAL at 23:08

## 2019-11-13 RX ADMIN — METHYLPREDNISOLONE SODIUM SUCCINATE SCH MG: 125 INJECTION, POWDER, FOR SOLUTION INTRAMUSCULAR; INTRAVENOUS at 23:08

## 2019-11-13 RX ADMIN — DEXTROSE MONOHYDRATE SCH MLS/HR: 50 INJECTION, SOLUTION INTRAVENOUS at 23:08

## 2019-11-13 NOTE — NUR
TRANSFER TO FLOOR:

Patient transferred to [facility] as ordered, per haroon aguilera.   Report given to navid lindo. belongings list completed with receiving rn.

## 2019-11-13 NOTE — NUR
ED Nurse Note:

received patient from homer shoemaker. patient sleeping in bed with no acute distress. 
patient on 2l nc spo2 94%. denies sob. respirations even and unlabored. vss. 
ao4.

## 2019-11-13 NOTE — NUR
ED Nurse Note:



ERMD notified that patient unable to obtain urine. Per ERMD, no need to obtain 
urine at this time.

## 2019-11-13 NOTE — NUR
ED Nurse Note:



Patient arrived to ED from home complaining of shortness of breath and 
nightsweats x 3 days. No hx of TB. Patient has a hx of COPD. Patient on the 
cardiac monitor, bed in lowest position. Blood and urine sent to lab.

## 2019-11-13 NOTE — DIAGNOSTIC IMAGING REPORT
Indication: Cough

 

Comparison:  9/18/2019

 

A single view chest radiograph was obtained.

 

Findings:

 

The lungs are hyperexpanded. Reticular densities again noted within perihilar and

basilar aspects of the lungs. Heart size is stable. Bones are osteopenic.

 

IMPRESSION:

 

COPD/emphysema

## 2019-11-13 NOTE — NUR
NURSE NOTES:

Patient's potassium 3.3, left message for Dr. Norman for coverage orders. Also asked if he 
would like repeat labs in the AM. Awaiting response.

## 2019-11-13 NOTE — NUR
ED Nurse Note:

-------------------------------------------------------------------------------

Addendum: 11/13/19 at 1800 by NFIELDS

-------------------------------------------------------------------------------

ED Nurse Note:



Patient stable, tolerating meds well.

## 2019-11-13 NOTE — NUR
NURSE NOTES:

Received patient from ED nurse Chelsie RN. Patient is awake and oriented x4 able to make needs 
known, receiving oxygen via nasal cannula at 2L/min, patient tolerating well. Skin is 
intact. Heart monitor is placed on. Bed is locked, placed in lowest position, side rails up 
x2, call light within reach. Will continue to monitor.

## 2019-11-13 NOTE — EMERGENCY ROOM REPORT
History of Present Illness


General


Chief Complaint:  Upper Respiratory Illness


Source:  Patient





Present Illness


Allergies:  


Coded Allergies:  


     Tuna (Verified  Allergy, Unknown, 6/18/19)





Patient History


Past Medical History:  COPD, other - HCV


Social History:  Denies: smoking - Hx of 40pack year.  quit 1.5 years ago., 

alcohol use, drug use





Nursing Documentation-[facility]


Past Medical History:  No History, Except For


Hx COPD:  Yes





Review of Systems


All Other Systems:  negative except mentioned in HPI





Physical Exam





Vital Signs








  Date Time  Temp Pulse Resp B/P (MAP) Pulse Ox O2 Delivery O2 Flow Rate FiO2


 


11/13/19 16:11 99.1 92 20 117/88 (98) 93 Room Air  


 


11/13/19 17:04        21








Sp02 EP Interpretation:  reviewed, normal


General Appearance:  no apparent distress, alert, GCS 15, non-toxic


Head:  normocephalic, atraumatic


Eyes:  bilateral eye normal inspection, bilateral eye PERRL


ENT:  hearing grossly normal, normal pharynx, no angioedema, normal voice


Neck:  full range of motion, supple/symm/no masses


Respiratory:  chest non-tender, respiratory distress, speaking full sentences, 

wheezing, expiration


Cardiovascular #1:  regular rate, rhythm, no edema


Gastrointestinal:  normal bowel sounds, non tender, soft, non-distended, no 

guarding, no rebound


Rectal:  deferred


Musculoskeletal:  back normal, gait/station normal, normal range of motion, non-

tender


Neurologic:  alert, oriented x3, responsive, motor strength/tone normal, 

sensory intact, speech normal


Psychiatric:  judgement/insight normal, memory normal, mood/affect normal, no 

suicidal/homicidal ideation


Skin:  no rash, normal color





Medical Decision Making


Diagnostic Impression:  


 Primary Impression:  


 COPD exacerbation


 Additional Impression:  


 Pneumonia


ER Course


This patient is critically ill.  This patient required complex medical decision-

making, aggressive intervention, extensive laboratory workup and monitoring.





Critical care time: 40 minutes.





Laboratory Tests








Test


  11/13/19


17:20


 


White Blood Count


  8.1 K/UL


(4.8-10.8)


 


Red Blood Count


  6.16 M/UL


(4.70-6.10)  H


 


Hemoglobin


  17.1 G/DL


(14.2-18.0)


 


Hematocrit


  50.0 %


(42.0-52.0)


 


Mean Corpuscular Volume 81 FL (80-99)  


 


Mean Corpuscular Hemoglobin


  27.8 PG


(27.0-31.0)


 


Mean Corpuscular Hemoglobin


Concent 34.3 G/DL


(32.0-36.0)


 


Red Cell Distribution Width


  10.6 %


(11.6-14.8)  L


 


Platelet Count


  121 K/UL


(150-450)  L


 


Mean Platelet Volume


  7.9 FL


(6.5-10.1)


 


Neutrophils (%) (Auto)


  76.9 %


(45.0-75.0)  H


 


Lymphocytes (%) (Auto)


  10.9 %


(20.0-45.0)  L


 


Monocytes (%) (Auto)


  10.3 %


(1.0-10.0)  H


 


Eosinophils (%) (Auto)


  0.1 %


(0.0-3.0)


 


Basophils (%) (Auto)


  1.8 %


(0.0-2.0)


 


Sodium Level


  133 MMOL/L


(136-145)  L


 


Potassium Level


  3.3 MMOL/L


(3.5-5.1)  L


 


Chloride Level


  91 MMOL/L


()  L


 


Carbon Dioxide Level


  35 MMOL/L


(21-32)  H


 


Anion Gap


  7 mmol/L


(5-15)


 


Blood Urea Nitrogen


  21 mg/dL


(7-18)  H


 


Creatinine


  1.5 MG/DL


(0.55-1.30)  H


 


Estimate Glomerular


Filtration Rate 57.1 mL/min


(>60)


 


Glucose Level


  94 MG/DL


()


 


Calcium Level


  8.7 MG/DL


(8.5-10.1)


 


Total Bilirubin


  0.7 MG/DL


(0.2-1.0)


 


Aspartate Amino Transferase


(AST) 68 U/L (15-37)


H


 


Alanine Aminotransferase (ALT)


  45 U/L (12-78)


 


 


Alkaline Phosphatase


  63 U/L


()


 


Total Protein


  8.2 G/DL


(6.4-8.2)


 


Albumin


  4.0 G/DL


(3.4-5.0)


 


Globulin 4.2 g/dL  


 


Albumin/Globulin Ratio 1.0 (1.0-2.7)  








EKG Diagnostic Results


Rate:  normal


Rhythm:  NSR


ST Segments:  no acute changes





Rhythm Strip Diag. Results


EP Interpretation:  yes


Rate:  90's


Rhythm:  NSR, no PVC's, no ectopy





Chest X-Ray Diagnostic Results


Chest X-Ray Diagnostic Results :  


   Chest X-Ray Ordered:  Yes


   # of Views/Limited/Complete:  1 View


   Indication:  Shortness of Breath


   EP Interpretation:  Yes


   Interpretation:  other - RLL opacification.


   Impression:  Other - See above


   Electronically Signed by:  Lilia Coreas DO





Last Vital Signs








  Date Time  Temp Pulse Resp B/P (MAP) Pulse Ox O2 Delivery O2 Flow Rate FiO2


 


11/13/19 17:04  93 22  98 Room Air  21





  92 20  93   


 


11/13/19 16:40 100.8   117/88    








Disposition:  ADMITTED AS INPATIENT


Condition:  Serious


Referrals:  


Juan M Uriarte DO (PCP)











Lilia Coreas DO Nov 13, 2019 18:19

## 2019-11-13 NOTE — NUR
NURSE NOTES:

Received patient from DARA Acosta. Patient arrived from the ED on gurney and placed into bed 
237-1. Cardiac monitor put on, patient cleaned up, gown and linen changed. Skin assessed. 
Will initiate plan of care.

## 2019-11-14 VITALS — SYSTOLIC BLOOD PRESSURE: 112 MMHG | DIASTOLIC BLOOD PRESSURE: 54 MMHG

## 2019-11-14 VITALS — SYSTOLIC BLOOD PRESSURE: 132 MMHG | DIASTOLIC BLOOD PRESSURE: 72 MMHG

## 2019-11-14 VITALS — SYSTOLIC BLOOD PRESSURE: 125 MMHG | DIASTOLIC BLOOD PRESSURE: 86 MMHG

## 2019-11-14 VITALS — DIASTOLIC BLOOD PRESSURE: 78 MMHG | SYSTOLIC BLOOD PRESSURE: 130 MMHG

## 2019-11-14 VITALS — SYSTOLIC BLOOD PRESSURE: 124 MMHG | DIASTOLIC BLOOD PRESSURE: 88 MMHG

## 2019-11-14 VITALS — DIASTOLIC BLOOD PRESSURE: 89 MMHG | SYSTOLIC BLOOD PRESSURE: 128 MMHG

## 2019-11-14 LAB
APPEARANCE UR: CLEAR
APTT PPP: (no result) S
CK SERPL-CCNC: 1051 U/L (ref 26–308)
GLUCOSE UR STRIP-MCNC: (no result) MG/DL
KETONES UR QL STRIP: NEGATIVE
LEUKOCYTE ESTERASE UR QL STRIP: NEGATIVE
NITRITE UR QL STRIP: NEGATIVE
PH UR STRIP: 5 [PH] (ref 4.5–8)
PROT UR QL STRIP: (no result)
SP GR UR STRIP: 1.01 (ref 1–1.03)
UROBILINOGEN UR-MCNC: NORMAL MG/DL (ref 0–1)

## 2019-11-14 RX ADMIN — METHOCARBAMOL SCH MG: 500 TABLET, FILM COATED ORAL at 08:21

## 2019-11-14 RX ADMIN — OXYCODONE HYDROCHLORIDE AND ACETAMINOPHEN PRN TAB: 5; 325 TABLET ORAL at 18:04

## 2019-11-14 RX ADMIN — THEOPHYLLINE ANHYDROUS SCH MG: 100 CAPSULE, EXTENDED RELEASE ORAL at 08:20

## 2019-11-14 RX ADMIN — HEPARIN SODIUM SCH UNITS: 5000 INJECTION INTRAVENOUS; SUBCUTANEOUS at 08:24

## 2019-11-14 RX ADMIN — DEXTROSE MONOHYDRATE SCH MLS/HR: 50 INJECTION, SOLUTION INTRAVENOUS at 13:41

## 2019-11-14 RX ADMIN — TRAZODONE HYDROCHLORIDE SCH MG: 100 TABLET ORAL at 20:26

## 2019-11-14 RX ADMIN — IPRATROPIUM BROMIDE AND ALBUTEROL SULFATE PRN ML: .5; 3 SOLUTION RESPIRATORY (INHALATION) at 16:25

## 2019-11-14 RX ADMIN — METHYLPREDNISOLONE SODIUM SUCCINATE SCH MG: 125 INJECTION, POWDER, FOR SOLUTION INTRAMUSCULAR; INTRAVENOUS at 06:27

## 2019-11-14 RX ADMIN — METHYLPREDNISOLONE SODIUM SUCCINATE SCH MG: 125 INJECTION, POWDER, FOR SOLUTION INTRAMUSCULAR; INTRAVENOUS at 12:39

## 2019-11-14 RX ADMIN — METHOCARBAMOL SCH MG: 500 TABLET, FILM COATED ORAL at 17:25

## 2019-11-14 RX ADMIN — DEXTROSE MONOHYDRATE SCH MLS/HR: 50 INJECTION, SOLUTION INTRAVENOUS at 21:55

## 2019-11-14 RX ADMIN — DOCUSATE SODIUM SCH MG: 100 CAPSULE, LIQUID FILLED ORAL at 16:19

## 2019-11-14 RX ADMIN — HEPARIN SODIUM SCH UNITS: 5000 INJECTION INTRAVENOUS; SUBCUTANEOUS at 20:26

## 2019-11-14 RX ADMIN — PROMETHAZINE HYDROCHLORIDE PRN ML: 6.25 SOLUTION ORAL at 04:55

## 2019-11-14 RX ADMIN — DEXTROSE MONOHYDRATE SCH MLS/HR: 50 INJECTION, SOLUTION INTRAVENOUS at 06:27

## 2019-11-14 RX ADMIN — METHOCARBAMOL SCH MG: 500 TABLET, FILM COATED ORAL at 12:38

## 2019-11-14 RX ADMIN — OXYCODONE HYDROCHLORIDE AND ACETAMINOPHEN PRN TAB: 5; 325 TABLET ORAL at 07:53

## 2019-11-14 RX ADMIN — THEOPHYLLINE ANHYDROUS SCH MG: 100 CAPSULE, EXTENDED RELEASE ORAL at 20:26

## 2019-11-14 NOTE — NUR
NURSE NOTES:

Received patient from DARA Miller. Patient is awake and oriented x4 able to make needs 
known, receiving oxygen via nasal cannula at 2L/min, patient tolerating well. No signs or 
respiratory or cardiac distress ntoed. Skin is intact. Pt is SR on tele monitor. L AC 20g IV 
catheter remains asymptomatic, patent and intact. Transfer to Med Surg order placed and 
awaiting accommodations.  Bed is locked, placed in lowest position, side rails up x2, call 
light within reach. Will continue to monitor.

## 2019-11-14 NOTE — NUR
NURSE NOTES:

Pt provided with evening bath and oral care. Pt tolerated care well and only needs minimal 
assistance. Pt remains resting in bed; bed is in lowest position, side rails up x2, safety 
wheels engaged and call light within easy reach. Will continue to monitor.

## 2019-11-14 NOTE — NUR
NURSE NOTES:

Heparin held platelet count is 121; no current signs or symptoms of bleeding noted. Patient 
is ambulatory. Lab work scheduled for 11/15/19 at 0400. Will continue to monitor and assess 
AM labs.

## 2019-11-14 NOTE — NUR
NURSE NOTES:

Patient's heart rate has been steadily in the 120s. Spoke with him, he states he is feeling 
fine and that his heart rate is usually high but takes cardizem at home to control it. No 
orders for cardizem at the moment, left message for MD.

## 2019-11-14 NOTE — NUR
NURSE NOTES:

Report given from Deedee Hayden.  Pt alert and oriented x 4, able to make needs known.  Pt 
denies pain or discomfort at this time.  Pt SR on cardiac monitor.  Pt on 1 L O2, denies 
SOB.  LAC 20 G noted and intact. Safety measures in place with bed locked and in lowest 
position, side rails x3 up and bed alarm on.  Will continue to monitor and continue plan of 
care.

## 2019-11-14 NOTE — HISTORY AND PHYSICAL REPORT
DATE OF ADMISSION:  11/13/2019

TIME SEEN:  2 p.m.



CONSULTANTS:

1. Toan Norman M.D.

2. Cesar Hui M.D.

3. Jezry Lo M.D.



CHIEF COMPLAINT:  Shortness of breath, pneumonia, sepsis, tachycardia.



BRIEF HISTORY:  This is a 64-year-old male, who lives at home, comes to my

office once a month, apparently over the last three days having increased

short of breath, coughing with phlegm, came to [place] ER, diagnosed with

exacerbation of COPD, shortness of breath, pneumonia, and sepsis, and

admitted to [facility] for further care.  Currently, calm in bed, slight short of

breath.  No complaint.



REVIEW OF SYSTEMS:  No chest pain.  Slight short of breath.  No nausea,

vomiting, or diarrhea.



PAST MEDICAL HISTORY:  Include COPD, ATN, emphysema, insomnia.



PAST SURGICAL HISTORY:  None.



MEDICATIONS:  Include methylprednisolone, methocarbamol, heparin,

theophylline, potassium, Zosyn, oxycodone, albuterol, lorazepam, morphine,

Zofran.



ALLERGIES:  Denies.



SOCIAL HISTORY:  No smoking.  Positive alcohol.  Positive marijuana use.

 



OBJECTIVE:

GENERAL:  Calm in bed, oriented x3, no acute distress.

VITAL SIGNS:  Temperature 97, pulse 93, respiratory rate 20, blood pressure

125/86.

CARDIOVASCULAR:  No murmur.

LUNGS:  Poor exchange.

ABDOMEN:  Bowel sounds distant.

EXTREMITIES:  No cyanosis or edema.



LABORATORY AND DIAGNOSTIC DATA:  Platelets 121, otherwise CBC is normal.

Sodium 133, potassium 3.3, chloride 91, CO2 ______ , BUN and creatinine

21/1.5, glucose 94.



ASSESSMENT:

1. Exacerbation of COPD.

2. Shortness of breath.

3. Pneumonia.

4. Sepsis.

5. Tachycardia.

6. ATN.

7. Emphysema.

8. Insomnia.



PLAN:

1. O2 and pulmonary treatment.

2. Antibiotics Per Infectious Diseases.

3. Blood pressure and pain control.

4. Dietary followup.

5. We will add Nephrology followup as well.

6. PT and dietary evaluation.

7. CBC and BMP in the morning.









  ______________________________________________

  Juan M Uriarte D.O.





DR:  Ernestina

D:  11/14/2019 14:28

T:  11/14/2019 18:49

JOB#:  [id number]

CC:

## 2019-11-14 NOTE — CONSULTATION
Consult Note


Consult Note





asked to evaluate at the request of Dr Uriarte for renal failure and abnormal 

electrolytes


Patient quite short of breath


examined


data reviewed


pulmonary note appretiated





64 year old male with hx of COPD, emphysema, presented to ER wt cc of ongoing 

cough with sputum production. He was at [facility] a few month ago for the same 

problem.    He also has significant amount of thick green sputum production.  

His CXR showed only emphysematous changes. He is admitted for acute 

exacerbation of COPD and purulent Bronchitis. .


 


All:      Tuna (Verified  Allergy, Unknown, 6/18/19)


Assessment/Plan





ATN (acute tubular necrosis)- was on NSAIDs


Acute exacerbation of chronic obstructive pulmonary disease (COPD)


Emphysema lung


h/o HCV











Plan;





Hold Motrin


Optimize pulmonary status


Urine studies


Urine tox screen


monitor renal parameters


2D echo


per orders











Shade Gray MD Nov 14, 2019 14:46

## 2019-11-14 NOTE — CARDIOLOGY PROGRESS NOTE
Assessment/Plan


Assessment/Plan


[id number]





Objective





Last 24 Hour Vital Signs








  Date Time  Temp Pulse Resp B/P (MAP) Pulse Ox O2 Delivery O2 Flow Rate FiO2


 


11/14/19 16:28  79 16  96 Nasal Cannula 2.0 28





  87 16  90   


 


11/14/19 16:00      Nasal Cannula 2.0 


 


11/14/19 16:00  90      


 


11/14/19 16:00 97.0 93 21 128/89 (102) 91   


 


11/14/19 13:35  73 18  92 Nasal Cannula 2.0 28


 


11/14/19 12:00      Nasal Cannula 2.0 


 


11/14/19 12:00 97.8 93 20 125/86 (99) 90   


 


11/14/19 11:43  90      


 


11/14/19 08:00 97.9 130 24 124/88 (100) 90   


 


11/14/19 08:00      Nasal Cannula 2.0 


 


11/14/19 08:00  140      


 


11/14/19 04:00      Nasal Cannula 2.0 


 


11/14/19 04:00 98.1 131 24 132/72 (92) 92   


 


11/14/19 03:48  74      


 


11/14/19 00:00 98.0 72 20 112/54 (73) 89   


 


11/14/19 00:00      Nasal Cannula 2.0 


 


11/13/19 23:38  79      


 


11/13/19 23:07  88 20  93 Nasal Cannula 2.0 28


 


11/13/19 21:52  85      


 


11/13/19 21:00 98.4 84 24 132/75 (94) 92   


 


11/13/19 21:00      Nasal Cannula 2.0 


 


11/13/19 20:15 99.1 92 20 127/60 94 Nasal Cannula 2.0 28

















Intake and Output  


 


 11/13/19 11/14/19





 18:59 06:59


 


Intake Total 1050 ml 1185.00 ml


 


Output Total  1300 ml


 


Balance 1050 ml -115.00 ml


 


  


 


Intake Oral  800 ml


 


IV Total 1050 ml 385.00 ml


 


Output Urine Total  1300 ml


 


# Voids  3


 


# Bowel Movements  3











Laboratory Tests








Test


  11/14/19


14:40 11/14/19


15:36


 


Urine Color Pale yellow   


 


Urine Appearance Clear   


 


Urine pH 5 (4.5-8.0)   


 


Urine Specific Gravity


  1.010


(1.005-1.035) 


 


 


Urine Protein


  1+ (NEGATIVE)


H 


 


 


Urine Glucose (UA)


  1+ (NEGATIVE)


H 


 


 


Urine Ketones


  Negative


(NEGATIVE) 


 


 


Urine Blood


  Negative


(NEGATIVE) 


 


 


Urine Nitrite


  Negative


(NEGATIVE) 


 


 


Urine Bilirubin


  Negative


(NEGATIVE) 


 


 


Urine Urobilinogen


  Normal MG/DL


(0.0-1.0) 


 


 


Urine Leukocyte Esterase


  Negative


(NEGATIVE) 


 


 


Urine RBC


  0 /HPF (0 - 0)


  


 


 


Urine WBC


  0-2 /HPF (0 -


0) 


 


 


Urine Squamous Epithelial


Cells None /LPF


(NONE/OCC) 


 


 


Urine Bacteria


  Few /HPF


(NONE) 


 


 


Urine Mucus


  Moderate /LPF


(NONE/OCC)  H 


 


 


Urine Eosinophils


  None seen


(NONE SEEN) 


 


 


Urine Osmolality


  560 mOsm/kg


(429-449)  H 


 


 


Urine Random Creatinine Pending   


 


Urine Random Microalbumin Pending   


 


Urine Random Sodium


  31 mmol/L


() 


 


 


Urine Microalbumin/Creatinine


Ratio Pending  


  


 


 


Urine Opiates Screen


  Positive


(NEGATIVE)  H 


 


 


Urine Barbiturates Screen


  Negative


(NEGATIVE) 


 


 


Phencyclidine (PCP) Screen


  Negative


(NEGATIVE) 


 


 


Urine Amphetamines Screen


  Negative


(NEGATIVE) 


 


 


Urine Benzodiazepines Screen


  Positive


(NEGATIVE)  H 


 


 


Urine Cocaine Screen


  Negative


(NEGATIVE) 


 


 


Urine Marijuana (THC) Screen


  Positive


(NEGATIVE)  H 


 


 


Uric Acid


  


  2.4 MG/DL


(2.6-7.2)  L


 


Total Creatine Kinase


  


  1051 U/L


()  H











Microbiology








 Date/Time


Source Procedure


Growth Status


 


 


 11/14/19 17:05


Nose - Final Complete


 


 11/14/19 17:05


Nose - Final Complete





 11/14/19 04:00


Sputum Gram Stain - Final Resulted


 


 11/14/19 04:00


Sputum Sputum Culture


Pending Resulted

















Jerzy Lo MD Nov 14, 2019 20:12

## 2019-11-14 NOTE — CONSULTATION
Consult Note


Consult Note


HPI: 63yo gentleman with PMH below presents to ED with productive cough with 

thick green sputum. Last week, started having sniffles and worsened cough. Got 

Z pack. Took Z pack. No improvement so he came to ED. Reports greenish sputum, 

SOB, LEMOS, sniffly, congestion. Watery diarrhea since starting Z ankit. No rash. 

No body aches. Denies fever or chills at home. No dysuria, hematuria. 


Daughter also had a cold but his symptoms started before he met up with his 

daughter. 





Pt was last admitted 6/2019 for productive cough. Pt received 5 days of zosyn. 





ROS: per HPI





PMH:


Emphysema


Constipation


COPD


Hep C


ATN


Insomnia





Meds: reviewed





All: Tuna





SHx: admitted from home. no pets. no travel. 





FHx: noncontributory





VS:


Gen: NAD


HEENT: anicteric sclera


CV: RRR. no rubs or gallop


Resp: prolonged expiratory phase. expiratory wheezes. Rhonchi.


Abd: distended. soft. no TTP


Ext: No LE edema


Neuro: alert. appropriate


Skin: warm. dry.





Labs: reviewed





Assessment:





Tmax 100.8


No leukocytosis


COPD exacerbation vs CAP


   CXR: The lungs are hyperexpanded. Reticular densities again noted within 

perihilar and basilar aspects of the lungs. Heart size is stable. Bones are 

osteopenic.


   11/14 sputum cx: P





r/o bacteremia


   11/13 BCx: P





Unlikely UTI


   UA negative








Plan:


continue Zosyn and azithromycin #2


   SP Ceftriaxone 11/13





flu swab


CRP


ESR


f/u bcx


f/u sputum cx


pulmonary toilet, breathing treatment, methylprednisolone per pulm








Thank you for this consult. [facility] will continue to follow the patient with 

you.











Rossi Gonzalez MD Nov 14, 2019 16:26

## 2019-11-15 VITALS — SYSTOLIC BLOOD PRESSURE: 135 MMHG | DIASTOLIC BLOOD PRESSURE: 70 MMHG

## 2019-11-15 VITALS — SYSTOLIC BLOOD PRESSURE: 134 MMHG | DIASTOLIC BLOOD PRESSURE: 94 MMHG

## 2019-11-15 VITALS — DIASTOLIC BLOOD PRESSURE: 84 MMHG | SYSTOLIC BLOOD PRESSURE: 144 MMHG

## 2019-11-15 VITALS — SYSTOLIC BLOOD PRESSURE: 143 MMHG | DIASTOLIC BLOOD PRESSURE: 80 MMHG

## 2019-11-15 VITALS — DIASTOLIC BLOOD PRESSURE: 71 MMHG | SYSTOLIC BLOOD PRESSURE: 123 MMHG

## 2019-11-15 VITALS — DIASTOLIC BLOOD PRESSURE: 79 MMHG | SYSTOLIC BLOOD PRESSURE: 136 MMHG

## 2019-11-15 LAB
ADD MANUAL DIFF: YES
ALBUMIN SERPL-MCNC: 3.3 G/DL (ref 3.4–5)
ALBUMIN/GLOB SERPL: 0.8 {RATIO} (ref 1–2.7)
ALP SERPL-CCNC: 58 U/L (ref 46–116)
ALT SERPL-CCNC: 40 U/L (ref 12–78)
ANION GAP SERPL CALC-SCNC: 9 MMOL/L (ref 5–15)
AST SERPL-CCNC: 47 U/L (ref 15–37)
BILIRUB SERPL-MCNC: 0.3 MG/DL (ref 0.2–1)
BUN SERPL-MCNC: 20 MG/DL (ref 7–18)
CALCIUM SERPL-MCNC: 8.6 MG/DL (ref 8.5–10.1)
CHLORIDE SERPL-SCNC: 102 MMOL/L (ref 98–107)
CK SERPL-CCNC: 675 U/L (ref 26–308)
CO2 SERPL-SCNC: 27 MMOL/L (ref 21–32)
CREAT SERPL-MCNC: 1.2 MG/DL (ref 0.55–1.3)
ERYTHROCYTE [DISTWIDTH] IN BLOOD BY AUTOMATED COUNT: 11.4 % (ref 11.6–14.8)
GLOBULIN SER-MCNC: 4 G/DL
HCT VFR BLD CALC: 47 % (ref 42–52)
HGB BLD-MCNC: 15.7 G/DL (ref 14.2–18)
MCV RBC AUTO: 82 FL (ref 80–99)
PLATELET # BLD: 133 K/UL (ref 150–450)
POTASSIUM SERPL-SCNC: 4.2 MMOL/L (ref 3.5–5.1)
RBC # BLD AUTO: 5.7 M/UL (ref 4.7–6.1)
SODIUM SERPL-SCNC: 138 MMOL/L (ref 136–145)
WBC # BLD AUTO: 16.1 K/UL (ref 4.8–10.8)

## 2019-11-15 RX ADMIN — DOCUSATE SODIUM SCH MG: 100 CAPSULE, LIQUID FILLED ORAL at 13:00

## 2019-11-15 RX ADMIN — TRAZODONE HYDROCHLORIDE SCH MG: 100 TABLET ORAL at 20:24

## 2019-11-15 RX ADMIN — METHOCARBAMOL SCH MG: 500 TABLET, FILM COATED ORAL at 17:51

## 2019-11-15 RX ADMIN — DOCUSATE SODIUM SCH MG: 100 CAPSULE, LIQUID FILLED ORAL at 17:52

## 2019-11-15 RX ADMIN — PROMETHAZINE HYDROCHLORIDE PRN ML: 6.25 SOLUTION ORAL at 09:34

## 2019-11-15 RX ADMIN — OXYCODONE HYDROCHLORIDE AND ACETAMINOPHEN PRN TAB: 5; 325 TABLET ORAL at 17:52

## 2019-11-15 RX ADMIN — THEOPHYLLINE ANHYDROUS SCH MG: 100 CAPSULE, EXTENDED RELEASE ORAL at 08:54

## 2019-11-15 RX ADMIN — TAMSULOSIN HYDROCHLORIDE SCH MG: 0.4 CAPSULE ORAL at 20:21

## 2019-11-15 RX ADMIN — DEXTROSE MONOHYDRATE SCH MLS/HR: 50 INJECTION, SOLUTION INTRAVENOUS at 05:18

## 2019-11-15 RX ADMIN — METHOCARBAMOL SCH MG: 500 TABLET, FILM COATED ORAL at 08:54

## 2019-11-15 RX ADMIN — HEPARIN SODIUM SCH UNITS: 5000 INJECTION INTRAVENOUS; SUBCUTANEOUS at 08:55

## 2019-11-15 RX ADMIN — HEPARIN SODIUM SCH UNITS: 5000 INJECTION INTRAVENOUS; SUBCUTANEOUS at 20:25

## 2019-11-15 RX ADMIN — METHOCARBAMOL SCH MG: 500 TABLET, FILM COATED ORAL at 12:44

## 2019-11-15 RX ADMIN — DEXTROSE MONOHYDRATE SCH MLS/HR: 50 INJECTION, SOLUTION INTRAVENOUS at 15:00

## 2019-11-15 RX ADMIN — THEOPHYLLINE ANHYDROUS SCH MG: 100 CAPSULE, EXTENDED RELEASE ORAL at 20:22

## 2019-11-15 RX ADMIN — PROMETHAZINE HYDROCHLORIDE PRN ML: 6.25 SOLUTION ORAL at 03:12

## 2019-11-15 RX ADMIN — IPRATROPIUM BROMIDE AND ALBUTEROL SULFATE PRN ML: .5; 3 SOLUTION RESPIRATORY (INHALATION) at 16:14

## 2019-11-15 RX ADMIN — IPRATROPIUM BROMIDE AND ALBUTEROL SULFATE PRN ML: .5; 3 SOLUTION RESPIRATORY (INHALATION) at 03:50

## 2019-11-15 RX ADMIN — DEXTROSE MONOHYDRATE SCH MLS/HR: 50 INJECTION, SOLUTION INTRAVENOUS at 21:17

## 2019-11-15 RX ADMIN — DOCUSATE SODIUM SCH MG: 100 CAPSULE, LIQUID FILLED ORAL at 08:55

## 2019-11-15 NOTE — NUR
TRANSFER TO FLOOR:

Patient transferred to [facility], per Dr. Uriarte.   Report given to DARA Islas.  Belongings and 
medications given to DARA Islas. Family and or S/O informed of transfer.

## 2019-11-15 NOTE — NUR
NURSE NOTES:

EKG tracing order; performed 12 lead EKGNormal Sinus Rhythm, 87bpm SD interval 126ms, QRS 
interval 96ms

Will continue to monitor.

## 2019-11-15 NOTE — GENERAL PROGRESS NOTE
Assessment/Plan


Problem List:  


(1) Insomnia


ICD Codes:  G47.00 - Insomnia, unspecified


SNOMED:  992640344


(2) ATN (acute tubular necrosis)


ICD Codes:  N17.0 - Acute kidney failure with tubular necrosis


SNOMED:  47580482


(3) Acute exacerbation of chronic obstructive pulmonary disease (COPD)


ICD Codes:  J44.1 - Chronic obstructive pulmonary disease with (acute) 

exacerbation


SNOMED:  098350437


(4) Pneumonia


ICD Codes:  J18.9 - Pneumonia, unspecified organism


SNOMED:  246920281


(5) Emphysema lung


ICD Codes:  J43.9 - Emphysema, unspecified


SNOMED:  57934546


Status:  stable, progressing


Assessment/Plan:


o2 pulm tx abx pt diet cbc bmp am





Subjective


Constitutional:  Reports: weakness


Respiratory:  Reports: shortness of breath


Allergies:  


Coded Allergies:  


     Tuna (Verified  Allergy, Unknown, 6/18/19)


All Systems:  reviewed and negative except above


Subjective


o2nc calm





Objective





Last 24 Hour Vital Signs








  Date Time  Temp Pulse Resp B/P (MAP) Pulse Ox O2 Delivery O2 Flow Rate FiO2


 


11/15/19 08:00 97.2 86 22 135/70 (91) 90   


 


11/15/19 07:30  84 18  96 Nasal Cannula 2.0 28


 


11/15/19 07:30     96 Nasal Cannula 2.0 28


 


11/15/19 04:00 97.4 91 24 144/84 (104) 91   


 


11/15/19 04:00      Nasal Cannula 2.0 


 


11/15/19 03:51  85      


 


11/15/19 03:50  79 16  96 Nasal Cannula 2.0 28





  80 16  92   


 


11/15/19 00:12  82      


 


11/15/19 00:00      Nasal Cannula 2.0 


 


11/15/19 00:00 98.1 75 20 123/71 (88) 97   


 


11/14/19 20:59  71 18  94 Nasal Cannula 2.0 28


 


11/14/19 20:00 98.4 81 24 130/78 (95) 94   


 


11/14/19 20:00      Nasal Cannula 2.0 


 


11/14/19 19:55  83      


 


11/14/19 16:28  79 16  96 Nasal Cannula 2.0 28





  87 16  90   


 


11/14/19 16:00      Nasal Cannula 2.0 


 


11/14/19 16:00  90      


 


11/14/19 16:00 97.0 93 21 128/89 (102) 91   


 


11/14/19 13:35  73 18  92 Nasal Cannula 2.0 28


 


11/14/19 12:00      Nasal Cannula 2.0 


 


11/14/19 12:00 97.8 93 20 125/86 (99) 90   


 


11/14/19 11:43  90      

















Intake and Output  


 


 11/14/19 11/15/19





 18:59 06:59


 


Intake Total 820.00 ml 377.5 ml


 


Output Total 1300 ml 


 


Balance -480.00 ml 377.5 ml


 


  


 


Intake Oral 500 ml 240 ml


 


IV Total 320.00 ml 137.5 ml


 


Output Urine Total 1300 ml 


 


# Voids 4 2


 


# Bowel Movements 6 








Laboratory Tests


11/14/19 14:40: 


Urine Color Pale yellow, Urine Appearance Clear, Urine pH 5, Urine Specific 

Gravity 1.010, Urine Protein 1+H, Urine Glucose (UA) 1+H, Urine Ketones Negative

, Urine Blood Negative, Urine Nitrite Negative, Urine Bilirubin Negative, Urine 

Urobilinogen Normal, Urine Leukocyte Esterase Negative, Urine RBC 0, Urine WBC 0

-2, Urine Squamous Epithelial Cells None, Urine Bacteria Few, Urine Mucus 

ModerateH, Urine Eosinophils None seen, Urine Osmolality 560H, Urine Random 

Creatinine [Pending], Urine Random Microalbumin [Pending], Urine Random Sodium 

31, Urine Microalbumin/Creatinine Ratio [Pending], Urine Opiates Screen 

PositiveH, Urine Barbiturates Screen Negative, Phencyclidine (PCP) Screen 

Negative, Urine Amphetamines Screen Negative, Urine Benzodiazepines Screen 

PositiveH, Urine Cocaine Screen Negative, Urine Marijuana (THC) Screen PositiveH


11/14/19 15:36: 


Uric Acid 2.4L, Total Creatine Kinase 1051H


11/15/19 03:24: 


Total Creatine Kinase 675H, White Blood Count 16.1#H, Red Blood Count 5.70, 

Hemoglobin 15.7, Hematocrit 47.0, Mean Corpuscular Volume 82, Mean Corpuscular 

Hemoglobin 27.6, Mean Corpuscular Hemoglobin Concent 33.5, Red Cell 

Distribution Width 11.4L, Platelet Count 133L, Mean Platelet Volume 8.7, 

Neutrophils (%) (Auto) , Lymphocytes (%) (Auto) , Monocytes (%) (Auto) , 

Eosinophils (%) (Auto) , Basophils (%) (Auto) , Differential Total Cells 

Counted 100, Neutrophils % (Manual) 84H, Lymphocytes % (Manual) 6L, Monocytes % 

(Manual) 10, Eosinophils % (Manual) 0, Basophils % (Manual) 0, Band Neutrophils 

0, Platelet Estimate DecreasedL, Platelet Morphology Normal, Erythrocyte 

Sedimentation Rate 17, Sodium Level 138, Potassium Level 4.2, Chloride Level 102

, Carbon Dioxide Level 27, Anion Gap 9, Blood Urea Nitrogen 20H, Creatinine 1.2

, Estimat Glomerular Filtration Rate > 60, Glucose Level 137H, Calcium Level 8.6

, Magnesium Level 2.2, Total Bilirubin 0.3, Aspartate Amino Transf (AST/SGOT) 

47H, Alanine Aminotransferase (ALT/SGPT) 40, Alkaline Phosphatase 58, Troponin 

I 0.000, C-Reactive Protein, Quantitative 3.0H, Pro-B-Type Natriuretic Peptide 

388H, Total Protein 7.3, Albumin 3.3L, Globulin 4.0, Albumin/Globulin Ratio 0.8L


Height (Feet):  6


Height (Inches):  2.00


Weight (Pounds):  191


General Appearance:  alert


EENT:  normal ENT inspection


Neck:  normal alignment


Cardiovascular:  normal peripheral pulses, normal rate, regular rhythm


Respiratory/Chest:  chest wall non-tender, lungs clear, normal breath sounds


Abdomen:  normal bowel sounds, non tender, soft


Extremities:  normal inspection


Edema:  no edema noted Arm (L), no edema noted Arm (R), no edema noted Leg (L), 

no edema noted Leg (R), no edema noted Pedal (L), no edema noted Pedal (R), no 

edema noted Generalized


Neurologic:  responsive, motor weakness


Skin:  normal pigmentation, warm/dry











Juan M Uriarte DO Nov 15, 2019 09:27

## 2019-11-15 NOTE — NUR
NURSE NOTES:

Pt received in bed awake alert, no c/o pain, pt already received pain medication, able to 
make needs known, call light within reach, head of bed elevated, will continue to monitor.

## 2019-11-15 NOTE — NUR
NURSE NOTES:

RT at bedside providing treatment. Pt O2 sats remains >90% and pt states dyspnea resolving. 
Wheezing upon auscultation dissipating. Pt remains resting in bed. Will continue to monitor.

## 2019-11-15 NOTE — DIAGNOSTIC IMAGING REPORT
Indication:Elevated Bun and Creatinine.

 

Technique: Grayscale and duplex Doppler imaging of the kidneys performed.

 

Comparison: None

 

Findings:

 

The size, contour, and echogenicity of both kidneys are within normal limits. There

is no hydronephrosis.. The right kidney measures 10.8 cm. in length. The left kidney

measures 12.1 cm. in length.

 

The IVC is patent. Urinary bladder is unremarkable.

 

IMPRESSION:

 

Negative ultrasound the kidneys.

## 2019-11-15 NOTE — NUR
NURSE NOTES:



Received patient in bed. Awake, alert, in no apparent distress. On nasal cannula at 2LPM. 
Call light within reach. Denies pain at this time. Will continue plan of care.

## 2019-11-15 NOTE — NUR
NURSE NOTES:

Updated pt belongings list, pt has cell phone(LG) at bedside, wallet with debit card $10, 
pants, upper dentures wearing.

## 2019-11-15 NOTE — CONSULTATION
DATE OF CONSULTATION:  11/14/2019

CARDIOLOGY CONSULTATION



CONSULTING PHYSICIAN:  Jerzy Lo M.D.



REFERRING PHYSICIAN:  Juan M Uriarte D.O.



REASON FOR REFERRAL:  Shortness of breath.



HISTORY OF PRESENT ILLNESS:  This is a 64-year-old gentleman, who presented

to the hospital because of abnormal symptoms _____ and subsequently

coughing and sputum production.  _____ and he was seen by Dr. Juan M Uriarte,

was given a Z-Bud, but continued to have symptoms and those symptoms got

worse and he finally presented to the emergency room.  He has some

tightness when he takes a deep breath, but otherwise, no pain.  There is

no PND and no orthopnea.  Does have dyspnea on exertion.  He did have

lightheadedness a few days ago when he was walking around and has no

palpitations.



PAST MEDICAL HISTORY:  Positive for history of COPD exacerbation, hepatitis

C infection, constipation, pneumonia, and he had a history of lung

surgery.



ALLERGIES:  He is not allergic to any medications.



SOCIAL HISTORY:  He quit smoking recently.  Does drink alcoholic beverages.

Does occasionally use marijuana.



REVIEW OF SYSTEMS:  GASTROINTESTINAL:  Diarrhea recently.    PULMONARY:

Cough with sputum production and wheezing.    CONSTITUTIONAL:  Negative

for fevers or chills.    NEUROLOGIC:  Negative.



PHYSICAL EXAMINATION:

GENERAL:  Shows to be an elderly gentleman, in no apparent respiratory

distress.

NECK:  Supple.  No jugular venous distention.

LUNGS:  Inspiratory and expiratory wheezes.  Decreased breath sounds

bilaterally.

CARDIAC:  Regular rate and rhythm.  No heaves, thrills, or gallops.

 

ABDOMEN:  Soft, nontender.  Positive bowel sounds.

EXTREMITIES:  There is no clubbing, cyanosis, or edema.

NEUROLOGICAL:  He is awake, alert, responsive.



LABORATORY AND DIAGNOSTIC DATA:  White count of 8, hemoglobin of 17.1,

platelet count of 121.  Sodium 132, potassium 3.3, chloride 91, bicarb of

35, BUN 21, creatinine 1.5, and glucose of 94.  Uric acid of 2.4.  AST of

68 and ALT of 45.  CPK of 1052.  Albumin of 4.0.  Urine drug screen

positive for opiates, benzodiazepine, and marijuana.  Urinalysis has

moderate mucus, otherwise fairly unremarkable.  The chest x-ray was

performed in the emergency room and showed COPD, emphysema, hyperexpanded

lung fields and microbiology and sputum culture is pending at this time.

His electrocardiogram shows normal sinus rhythm, no ST or T-wave

abnormalities.



ASSESSMENT AND PLAN:

1. COPD with exacerbation.

2. Possible pneumonia.

3. Elevated CPK, questionable musculoskeletal in origin versus others.

4. Thrombocytopenia.

5. Hyponatremia.

6. Acute renal failure.



Dr. Uriarte and Dr. Norman, this patient was seen in cardiac

consultation.  The patient does complain of some tightening, but _____

cardiac.  His CPK was elevated.  His EKG was fairly unremarkable.  He will

have cardiac enzymes performed as well as CPK to be followed in the

morning.  An echocardiogram will be ordered as well.  Breathing treatments

will be continued.  I doubt any evidence of congestive heart failure if

breathing treatments should be continued.









  ______________________________________________

  Jerzy Lo M.D.





DR:  CLAUDIA

D:  11/14/2019 20:14

T:  11/14/2019 23:43

JOB#:  [id number]

CC:

## 2019-11-15 NOTE — NEPHROLOGY PROGRESS NOTE
Assessment/Plan


Problem List:  


(1) ATN (acute tubular necrosis)


(2) COPD exacerbation


(3) Drug abuse


(4) Rhabdomyolysis


Assessment:  mild 





Assessment





ATN (acute tubular necrosis)- was on NSAIDs


Acute exacerbation of chronic obstructive pulmonary disease (COPD)


Emphysema lung


h/o HCV


Plan





Urine + Opiates, Benzo, MJ





Hold Motrin


Optimize pulmonary status


Urine studies


Urine tox screen


monitor renal parameters


2D echo


per orders





Subjective


ROS Limited/Unobtainable:  No


Constitutional:  Reports: malaise, weakness





Objective


Objective





Last 24 Hour Vital Signs








  Date Time  Temp Pulse Resp B/P (MAP) Pulse Ox O2 Delivery O2 Flow Rate FiO2


 


11/15/19 08:00 97.2 86 22 135/70 (91) 90   


 


11/15/19 07:30  84 18  96 Nasal Cannula 2.0 28


 


11/15/19 07:30     96 Nasal Cannula 2.0 28


 


11/15/19 07:29  85      


 


11/15/19 04:00 97.4 91 24 144/84 (104) 91   


 


11/15/19 04:00      Nasal Cannula 2.0 


 


11/15/19 03:51  85      


 


11/15/19 03:50  79 16  96 Nasal Cannula 2.0 28





  80 16  92   


 


11/15/19 00:12  82      


 


11/15/19 00:00      Nasal Cannula 2.0 


 


11/15/19 00:00 98.1 75 20 123/71 (88) 97   


 


11/14/19 20:59  71 18  94 Nasal Cannula 2.0 28


 


11/14/19 20:00 98.4 81 24 130/78 (95) 94   


 


11/14/19 20:00      Nasal Cannula 2.0 


 


11/14/19 19:55  83      


 


11/14/19 16:28  79 16  96 Nasal Cannula 2.0 28





  87 16  90   


 


11/14/19 16:00      Nasal Cannula 2.0 


 


11/14/19 16:00  90      


 


11/14/19 16:00 97.0 93 21 128/89 (102) 91   


 


11/14/19 13:35  73 18  92 Nasal Cannula 2.0 28


 


11/14/19 12:00      Nasal Cannula 2.0 


 


11/14/19 12:00 97.8 93 20 125/86 (99) 90   


 


11/14/19 11:43  90      

















Intake and Output  


 


 11/14/19 11/15/19





 19:00 07:00


 


Intake Total 804.82 ml 377.5 ml


 


Output Total 1300 ml 


 


Balance -495.18 ml 377.5 ml


 


  


 


Intake Oral 500 ml 240 ml


 


IV Total 304.82 ml 137.5 ml


 


Output Urine Total 1300 ml 


 


# Voids 4 2


 


# Bowel Movements 6 








Laboratory Tests


11/14/19 14:40: 


Urine Color Pale yellow, Urine Appearance Clear, Urine pH 5, Urine Specific 

Gravity 1.010, Urine Protein 1+H, Urine Glucose (UA) 1+H, Urine Ketones Negative

, Urine Blood Negative, Urine Nitrite Negative, Urine Bilirubin Negative, Urine 

Urobilinogen Normal, Urine Leukocyte Esterase Negative, Urine RBC 0, Urine WBC 0

-2, Urine Squamous Epithelial Cells None, Urine Bacteria Few, Urine Mucus 

ModerateH, Urine Eosinophils None seen, Urine Osmolality 560H, Urine Random 

Creatinine [Pending], Urine Random Microalbumin [Pending], Urine Random Sodium 

31, Urine Microalbumin/Creatinine Ratio [Pending], Urine Opiates Screen 

PositiveH, Urine Barbiturates Screen Negative, Phencyclidine (PCP) Screen 

Negative, Urine Amphetamines Screen Negative, Urine Benzodiazepines Screen 

PositiveH, Urine Cocaine Screen Negative, Urine Marijuana (THC) Screen PositiveH


11/14/19 15:36: 


Uric Acid 2.4L, Total Creatine Kinase 1051H


11/15/19 03:24: 


Total Creatine Kinase 675H, White Blood Count 16.1#H, Red Blood Count 5.70, 

Hemoglobin 15.7, Hematocrit 47.0, Mean Corpuscular Volume 82, Mean Corpuscular 

Hemoglobin 27.6, Mean Corpuscular Hemoglobin Concent 33.5, Red Cell 

Distribution Width 11.4L, Platelet Count 133L, Mean Platelet Volume 8.7, 

Neutrophils (%) (Auto) , Lymphocytes (%) (Auto) , Monocytes (%) (Auto) , 

Eosinophils (%) (Auto) , Basophils (%) (Auto) , Differential Total Cells 

Counted 100, Neutrophils % (Manual) 84H, Lymphocytes % (Manual) 6L, Monocytes % 

(Manual) 10, Eosinophils % (Manual) 0, Basophils % (Manual) 0, Band Neutrophils 

0, Platelet Estimate DecreasedL, Platelet Morphology Normal, Erythrocyte 

Sedimentation Rate 17, Sodium Level 138, Potassium Level 4.2, Chloride Level 102

, Carbon Dioxide Level 27, Anion Gap 9, Blood Urea Nitrogen 20H, Creatinine 1.2

, Estimat Glomerular Filtration Rate > 60, Glucose Level 137H, Calcium Level 8.6

, Magnesium Level 2.2, Total Bilirubin 0.3, Aspartate Amino Transf (AST/SGOT) 

47H, Alanine Aminotransferase (ALT/SGPT) 40, Alkaline Phosphatase 58, Troponin 

I 0.000, C-Reactive Protein, Quantitative 3.0H, Pro-B-Type Natriuretic Peptide 

388H, Total Protein 7.3, Albumin 3.3L, Globulin 4.0, Albumin/Globulin Ratio 0.8L


Height (Feet):  6


Height (Inches):  2.00


Weight (Pounds):  191


General Appearance:  mild distress


Respiratory/Chest:  decreased breath sounds


Abdomen:  distended











Shade Gray MD Nov 15, 2019 11:12

## 2019-11-15 NOTE — NUR
NURSE NOTES:

Patient awoke and forgot he was in the hospital. Pt tried to go to the restroom unattended 
and ripped IV tubing. Patient assisted back to bed uneventfully. IV catheter assessed, 
remains asymptomatic and patent. Dressing changed. Pt shows signs of dyspnea and is 
coughing. Pt request PRN medication for cough, will follow through. Called RT for PRN HHN 
treatment as ordered for dyspnea. Pt denies complaints of chest pain. Pt provided with 
urinal for elimination. Pt remains resting in bed; bed is in lowest position, safety wheels 
engaged, call light within reach and side rails up x2.

## 2019-11-15 NOTE — NUR
P.T Note:

P.T evaluation completed.  Pt is alert, O x4 , pleasant and cooperative. Pt denied c/o pain. 
Pt is independent with ADL/functional mobilities and gait/locomotion. Currently functional 
status does not warrant skilled P.T service at this time however encouraged pt OOB 
activities VS bedrest during stay to prevent deconditioning. Pt verbalized understanding. DC 
P.T services. Thank you for this referral.

## 2019-11-15 NOTE — INFECTIOUS DISEASES PROG NOTE
Assessment/Plan


Assessment/Plan


65yo gentleman with PMH below presents to ED with productive cough with thick 

green sputum. Last week, started having sniffles and worsened cough. Got Z 

pack. Took Z pack. No improvement so he came to ED. Reports greenish sputum, SOB

, LEMOS, sniffly, congestion. Watery diarrhea since starting Z ankit. No rash. No 

body aches. Denies fever or chills at home. No dysuria, hematuria. 


Daughter also had a cold but his symptoms started before he met up with his 

daughter. 





Tmax 100.8


No leukocytosis


COPD exacerbation vs CAP


   flu swab negative


   CXR: The lungs are hyperexpanded. Reticular densities again noted within 

perihilar and basilar aspects of the lungs. Heart size is stable. Bones are 

osteopenic.


   11/14 sputum cx: P





r/o bacteremia


   11/13 BCx: P





Unlikely UTI


   UA negative








Plan:


continue Zosyn and azithromycin #3/7...f/u sputum cx


   SP Ceftriaxone 11/13





f/u bcx


f/u sputum cx


pulmonary toilet, breathing treatment, methylprednisolone per pulm


discussed with RN





Thank you for this consult. [facility] will continue to follow the patient with 

you.











Rossi Gonzalez MD Nov 14, 2019 16:26





Subjective


Allergies:  


Coded Allergies:  


     Tuna (Verified  Allergy, Unknown, 6/18/19)


Subjective


Afebrile. 2L.


Leukocytosis on steroids


Reports breathing slightly better





Objective


Vital Signs





Last 24 Hour Vital Signs








  Date Time  Temp Pulse Resp B/P (MAP) Pulse Ox O2 Delivery O2 Flow Rate FiO2


 


11/15/19 07:30  84 18  96 Nasal Cannula 2.0 28


 


11/15/19 07:30     96 Nasal Cannula 2.0 28


 


11/15/19 04:00 97.4 91 24 144/84 (104) 91   


 


11/15/19 04:00      Nasal Cannula 2.0 


 


11/15/19 03:51  85      


 


11/15/19 03:50  79 16  96 Nasal Cannula 2.0 28





  80 16  92   


 


11/15/19 00:12  82      


 


11/15/19 00:00      Nasal Cannula 2.0 


 


11/15/19 00:00 98.1 75 20 123/71 (88) 97   


 


11/14/19 20:59  71 18  94 Nasal Cannula 2.0 28


 


11/14/19 20:00 98.4 81 24 130/78 (95) 94   


 


11/14/19 20:00      Nasal Cannula 2.0 


 


11/14/19 19:55  83      


 


11/14/19 16:28  79 16  96 Nasal Cannula 2.0 28





  87 16  90   


 


11/14/19 16:00      Nasal Cannula 2.0 


 


11/14/19 16:00  90      


 


11/14/19 16:00 97.0 93 21 128/89 (102) 91   


 


11/14/19 13:35  73 18  92 Nasal Cannula 2.0 28


 


11/14/19 12:00      Nasal Cannula 2.0 


 


11/14/19 12:00 97.8 93 20 125/86 (99) 90   


 


11/14/19 11:43  90      


 


11/14/19 08:00 97.9 130 24 124/88 (100) 90   


 


11/14/19 08:00      Nasal Cannula 2.0 


 


11/14/19 08:00  140      








Height (Feet):  6


Height (Inches):  2.00


Weight (Pounds):  191


Objective





Gen: NAD


HEENT: anicteric sclera


CV: RRR. no rubs or gallop


Resp: prolonged expiratory phase. expiratory wheezes. Rhonchi.


Abd: distended. soft. no TTP


Ext: No LE edema


Neuro: alert. appropriate


Skin: warm. dry.





Microbiology








 Date/Time


Source Procedure


Growth Status


 


 


 11/13/19 17:20


Blood Blood Culture - Preliminary


NO GROWTH AFTER 24 HOURS Resulted


 


 11/13/19 17:05


Blood Blood Culture - Preliminary


NO GROWTH AFTER 24 HOURS Resulted





 11/14/19 17:05


Nose - Final Complete


 


 11/14/19 17:05


Nose - Final Complete





 11/14/19 04:00


Sputum Gram Stain - Final Resulted


 


 11/14/19 04:00


Sputum Sputum Culture


Pending Resulted











Laboratory Tests








Test


  11/14/19


14:40 11/14/19


15:36 11/15/19


03:24


 


Urine Color Pale yellow    


 


Urine Appearance Clear    


 


Urine pH 5 (4.5-8.0)    


 


Urine Specific Gravity


  1.010


(1.005-1.035) 


  


 


 


Urine Protein


  1+ (NEGATIVE)


H 


  


 


 


Urine Glucose (UA)


  1+ (NEGATIVE)


H 


  


 


 


Urine Ketones


  Negative


(NEGATIVE) 


  


 


 


Urine Blood


  Negative


(NEGATIVE) 


  


 


 


Urine Nitrite


  Negative


(NEGATIVE) 


  


 


 


Urine Bilirubin


  Negative


(NEGATIVE) 


  


 


 


Urine Urobilinogen


  Normal MG/DL


(0.0-1.0) 


  


 


 


Urine Leukocyte Esterase


  Negative


(NEGATIVE) 


  


 


 


Urine RBC


  0 /HPF (0 - 0)


  


  


 


 


Urine WBC


  0-2 /HPF (0 -


0) 


  


 


 


Urine Squamous Epithelial


Cells None /LPF


(NONE/OCC) 


  


 


 


Urine Bacteria


  Few /HPF


(NONE) 


  


 


 


Urine Mucus


  Moderate /LPF


(NONE/OCC)  H 


  


 


 


Urine Eosinophils


  None seen


(NONE SEEN) 


  


 


 


Urine Osmolality


  560 mOsm/kg


(429-449)  H 


  


 


 


Urine Random Creatinine Pending    


 


Urine Random Microalbumin Pending    


 


Urine Random Sodium


  31 mmol/L


() 


  


 


 


Urine Microalbumin/Creatinine


Ratio Pending  


  


  


 


 


Urine Opiates Screen


  Positive


(NEGATIVE)  H 


  


 


 


Urine Barbiturates Screen


  Negative


(NEGATIVE) 


  


 


 


Phencyclidine (PCP) Screen


  Negative


(NEGATIVE) 


  


 


 


Urine Amphetamines Screen


  Negative


(NEGATIVE) 


  


 


 


Urine Benzodiazepines Screen


  Positive


(NEGATIVE)  H 


  


 


 


Urine Cocaine Screen


  Negative


(NEGATIVE) 


  


 


 


Urine Marijuana (THC) Screen


  Positive


(NEGATIVE)  H 


  


 


 


Uric Acid


  


  2.4 MG/DL


(2.6-7.2)  L 


 


 


Total Creatine Kinase


  


  1051 U/L


()  H 675 U/L


()  H


 


White Blood Count


  


  


  16.1 K/UL


(4.8-10.8)  #H


 


Red Blood Count


  


  


  5.70 M/UL


(4.70-6.10)


 


Hemoglobin


  


  


  15.7 G/DL


(14.2-18.0)


 


Hematocrit


  


  


  47.0 %


(42.0-52.0)


 


Mean Corpuscular Volume   82 FL (80-99)  


 


Mean Corpuscular Hemoglobin


  


  


  27.6 PG


(27.0-31.0)


 


Mean Corpuscular Hemoglobin


Concent 


  


  33.5 G/DL


(32.0-36.0)


 


Red Cell Distribution Width


  


  


  11.4 %


(11.6-14.8)  L


 


Platelet Count


  


  


  133 K/UL


(150-450)  L


 


Mean Platelet Volume


  


  


  8.7 FL


(6.5-10.1)


 


Neutrophils (%) (Auto)


  


  


  % (45.0-75.0)


 


 


Lymphocytes (%) (Auto)


  


  


  % (20.0-45.0)


 


 


Monocytes (%) (Auto)    % (1.0-10.0)  


 


Eosinophils (%) (Auto)    % (0.0-3.0)  


 


Basophils (%) (Auto)    % (0.0-2.0)  


 


Neutrophils % (Manual)   Pending  


 


Lymphocytes % (Manual)   Pending  


 


Platelet Estimate   Pending  


 


Platelet Morphology   Pending  


 


Erythrocyte Sedimentation Rate


  


  


  17 MM/HR


(0-20)


 


Sodium Level


  


  


  138 MMOL/L


(136-145)


 


Potassium Level


  


  


  4.2 MMOL/L


(3.5-5.1)


 


Chloride Level


  


  


  102 MMOL/L


()


 


Carbon Dioxide Level


  


  


  27 MMOL/L


(21-32)


 


Anion Gap


  


  


  9 mmol/L


(5-15)


 


Blood Urea Nitrogen


  


  


  20 mg/dL


(7-18)  H


 


Creatinine


  


  


  1.2 MG/DL


(0.55-1.30)


 


Estimat Glomerular Filtration


Rate 


  


  > 60 mL/min


(>60)


 


Glucose Level


  


  


  137 MG/DL


()  H


 


Calcium Level


  


  


  8.6 MG/DL


(8.5-10.1)


 


Magnesium Level


  


  


  2.2 MG/DL


(1.8-2.4)


 


Total Bilirubin


  


  


  0.3 MG/DL


(0.2-1.0)


 


Aspartate Amino Transf


(AST/SGOT) 


  


  47 U/L (15-37)


H


 


Alanine Aminotransferase


(ALT/SGPT) 


  


  40 U/L (12-78)


 


 


Alkaline Phosphatase


  


  


  58 U/L


()


 


Troponin I


  


  


  0.000 ng/mL


(0.000-0.056)


 


C-Reactive Protein,


Quantitative 


  


  3.0 mg/dL


(0.00-0.90)  H


 


Pro-B-Type Natriuretic Peptide


  


  


  388 pg/mL


(0-125)  H


 


Total Protein


  


  


  7.3 G/DL


(6.4-8.2)


 


Albumin


  


  


  3.3 G/DL


(3.4-5.0)  L


 


Globulin   4.0 g/dL  


 


Albumin/Globulin Ratio


  


  


  0.8 (1.0-2.7)


L











Current Medications








 Medications


  (Trade)  Dose


 Ordered  Sig/Zana


 Route


 PRN Reason  Start Time


 Stop Time Status Last Admin


Dose Admin


 


 Albuterol/


 Ipratropium


  (Albuterol/


 Ipratropium)  3 ml  Q4H  PRN


 HHN


 dyspnea  11/13/19 21:15


 11/18/19 21:14  11/15/19 03:50


 


 


 Cyclobenzaprine


 HCl


  (Flexeril)  10 mg  HSPRN  PRN


 ORAL


 muscle spasm  11/13/19 21:15


 12/13/19 21:14   


 


 


 Dextrose


  (Dextrose 50%)  25 ml  Q30M  PRN


 IV


 Hypoglycemia  11/13/19 21:15


 12/13/19 21:14   


 


 


 Dextrose


  (Dextrose 50%)  50 ml  Q30M  PRN


 IV


 Hypoglycemia  11/13/19 21:15


 12/13/19 21:14   


 


 


 Docusate Sodium


  (Colace)  100 mg  THREE TIMES A  DAY


 ORAL


   11/14/19 18:00


 12/14/19 17:59   


 


 


 Heparin Sodium


  (Porcine)


  (Heparin 5000


 units/ml)  5,000 units  EVERY 12  HOURS


 SUBQ


   11/14/19 09:00


 12/14/19 08:59  11/14/19 08:24


 


 


 Lorazepam


  (Ativan 2mg/ml


 1ml)  0.5 mg  Q4H  PRN


 IV


 For Anxiety  11/13/19 21:15


 11/20/19 21:14   


 


 


 Methocarbamol


  (Robaxin)  500 mg  TID


 ORAL


   11/14/19 09:00


 12/14/19 08:59  11/14/19 17:25


 


 


 Methylprednisolone


 Sodium Succinate


  (Solu-MEDROL)  60 mg  DAILY


 IV


   11/15/19 09:00


 12/14/19 00:00   


 


 


 Morphine Sulfate


  (Morphine


 Sulfate)  2 mg  Q4H  PRN


 IVP


 severe pain 7-10  11/13/19 21:15


 11/20/19 21:14   


 


 


 Nitroglycerin


  (Ntg)  0.4 mg  Q5M X 3 DOSES PRN


 SL


 Prn Chest Pain  11/13/19 21:15


 12/13/19 21:14   


 


 


 Ondansetron HCl


  (Zofran)  4 mg  Q6H  PRN


 IVP


 Nausea & Vomiting  11/13/19 21:15


 12/13/19 21:14   


 


 


 Oxycodone/


 Acetaminophen


  (Percocet 5-325)  1 tab  Q6H  PRN


 ORAL


 Moderate Pain (Pain Scale 4-6)  11/13/19 21:15


 11/20/19 21:14  11/14/19 18:04


 


 


 Pantoprazole


  (Protonix)  40 mg  EVERY 12  HOURS


 ORAL


   11/14/19 21:00


 12/14/19 20:59  11/14/19 20:25


 


 


 Piperacillin Sod/


 Tazobactam Sod


 3.375 gm/Sodium


 Chloride  110 ml @ 


 27.5 mls/hr  EVERY 8  HOURS


 IVPB


   11/13/19 23:00


 11/18/19 22:59  11/15/19 05:18


 


 


 Promethazine HCl/


 Codeine


  (Phenergan with


 Codeine)  5 ml  Q6H  PRN


 ORAL


 cough  11/13/19 21:15


 12/13/19 21:14  11/15/19 03:12


 


 


 Quetiapine


 Fumarate


  (SEROquel)  100 mg  QHS


 ORAL


   11/13/19 21:45


 12/13/19 21:44  11/14/19 20:26


 


 


 Tamsulosin HCl


  (Flomax)  0.4 mg  BEDTIME


 ORAL


   11/14/19 21:00


 12/14/19 20:59  11/14/19 20:26


 


 


 Temazepam


  (Restoril)  15 mg  HSPRN  PRN


 ORAL


 Insomnia  11/13/19 21:15


 11/20/19 21:14   


 


 


 Theophylline


  (Julien-Dur)  100 mg  EVERY 12  HOURS


 ORAL


   11/14/19 09:00


 12/14/19 08:59  11/14/19 20:26


 


 


 Trazodone HCl


  (Desyrel)  100 mg  BEDTIME


 ORAL


   11/13/19 21:45


 12/13/19 21:44  11/14/19 20:26


 

















Rossi Gonzalez MD Nov 15, 2019 07:49

## 2019-11-15 NOTE — NUR
HAND-OFF: 

Report given to DARA Salazar. No signs of respiratory or cardiac distress noted at this time.

## 2019-11-15 NOTE — NUR
SI;     PNA

T. 97.2 HR 86 RR 22 B/P 135/70

 2L N/C

WBC 16.1 BUN 20 AST 47  









IS:     ZYVOX IV

SOLU MEDROL IV

PROTONIX IV

ALB HHN

********STEP DOWN STATUS*******

## 2019-11-15 NOTE — CARDIOLOGY REPORT
--------------- APPROVED REPORT --------------





EXAM: Two-dimensional and M-mode echocardiogram with Doppler and color Doppler.



INDICATION

Congestive Heart Failure 



M-Mode DIMENSIONS 

IVSd1.2 (0.7-1.1cm)Left Atrium (MM)3.8 (1.6-4.0cm)

LVDd4.5 (3.5-5.6cm)Aortic Root3.4 (2.0-3.7cm)

PWd1.2 (0.7-1.1cm)Aortic Cusp Exc.1.6 (1.5-2.0cm)



LVDs3.0 (2.5-4.0cm)

PWs1.6 cm





Technically difficult study due to poor acoustical windows. Subcostal views only.

Normal left ventricular chamber size, systolic function and wall motion to extent 

visualized.

Left ventricular ejection fraction estimated to be  55 %.

Study quality precludes accurate  assessment of regional wall motion.

No evidence of  left ventricular hypertrophy.

No evidence of pericardial effusion. 

All other cardiac chamber sizes are within normal limits. 

Focal aortic valve sclerosis with adequate cusp excursion.

Thickened mitral valve leaflets with normal excursion.

Mitral annulus and aortic root calcification.

Pulmonic valve not well visualized.

Normal tricuspid valve structure. 

IVC at normal size with physiologic collapse.



A  color flow and spectral Doppler study was performed and revealed:

Trace mitral regurgitation.

Mitral inflow indicates normal left ventricular diastolic function. 

Mild tricuspid regurgitation.

Tricuspid  systolic velocities suggests peak right ventricular systolic pressure of  36  

mmHg, consistent with mild pulmonary hypertension.

Trace pulmonic regurgitation present.

## 2019-11-15 NOTE — PULMONOLOGY PROGRESS NOTE
Assessment/Plan


Problems:  


(1) Acute exacerbation of chronic obstructive pulmonary disease (COPD)


(2) ATN (acute tubular necrosis)


(3) Emphysema lung


Assessment/Plan


afebrile


getting better


check sputum


continue abx


all reviewed


dvt prophylaxis.





Subjective


ROS Limited/Unobtainable:  No


Interval Events:  feeling better


Allergies:  


Coded Allergies:  


     Tuna (Verified  Allergy, Unknown, 6/18/19)





Objective





Last 24 Hour Vital Signs








  Date Time  Temp Pulse Resp B/P (MAP) Pulse Ox O2 Delivery O2 Flow Rate FiO2


 


11/15/19 12:00      Room Air  


 


11/15/19 12:00 97.2 83 19 136/79 (98) 92   


 


11/15/19 11:36  77      


 


11/15/19 08:00 97.2 86 22 135/70 (91) 90   


 


11/15/19 08:00      Nasal Cannula 2.0 


 


11/15/19 07:30  84 18  96 Nasal Cannula 2.0 28


 


11/15/19 07:30     96 Nasal Cannula 2.0 28


 


11/15/19 07:29  85      


 


11/15/19 04:00 97.4 91 24 144/84 (104) 91   


 


11/15/19 04:00      Nasal Cannula 2.0 


 


11/15/19 03:51  85      


 


11/15/19 03:50  79 16  96 Nasal Cannula 2.0 28





  80 16  92   


 


11/15/19 00:12  82      


 


11/15/19 00:00      Nasal Cannula 2.0 


 


11/15/19 00:00 98.1 75 20 123/71 (88) 97   


 


11/14/19 20:59  71 18  94 Nasal Cannula 2.0 28


 


11/14/19 20:00 98.4 81 24 130/78 (95) 94   


 


11/14/19 20:00      Nasal Cannula 2.0 


 


11/14/19 19:55  83      


 


11/14/19 16:28  79 16  96 Nasal Cannula 2.0 28





  87 16  90   


 


11/14/19 16:00      Nasal Cannula 2.0 


 


11/14/19 16:00  90      


 


11/14/19 16:00 97.0 93 21 128/89 (102) 91   

















Intake and Output  


 


 11/14/19 11/15/19





 19:00 07:00


 


Intake Total 804.82 ml 377.5 ml


 


Output Total 1300 ml 


 


Balance -495.18 ml 377.5 ml


 


  


 


Intake Oral 500 ml 240 ml


 


IV Total 304.82 ml 137.5 ml


 


Output Urine Total 1300 ml 


 


# Voids 4 2


 


# Bowel Movements 6 








General Appearance:  WD/WN


HEENT:  normocephalic, atraumatic


Respiratory/Chest:  chest wall non-tender, lungs clear


Cardiovascular:  normal peripheral pulses, normal rate


Abdomen:  normal bowel sounds, soft, non tender, no scars


Extremities:  no cyanosis


Neurologic/Psychiatric:  CNs II-XII grossly normal





Microbiology








 Date/Time


Source Procedure


Growth Status


 


 


 11/13/19 17:20


Blood Blood Culture - Preliminary


NO GROWTH AFTER 24 HOURS Resulted


 


 11/13/19 17:05


Blood Blood Culture - Preliminary


NO GROWTH AFTER 24 HOURS Resulted





 11/14/19 17:05


Nose - Final Complete


 


 11/14/19 17:05


Nose - Final Complete





 11/14/19 04:00


Sputum Gram Stain - Final Resulted


 


 11/14/19 04:00


Sputum Sputum Culture - Preliminary


NORMAL UPPER RESPIRATORY LÓPEZ AT 24 ... Resulted








Laboratory Tests


11/14/19 14:40: 


Urine Color Pale yellow, Urine Appearance Clear, Urine pH 5, Urine Specific 

Gravity 1.010, Urine Protein 1+H, Urine Glucose (UA) 1+H, Urine Ketones Negative

, Urine Blood Negative, Urine Nitrite Negative, Urine Bilirubin Negative, Urine 

Urobilinogen Normal, Urine Leukocyte Esterase Negative, Urine RBC 0, Urine WBC 0

-2, Urine Squamous Epithelial Cells None, Urine Bacteria Few, Urine Mucus 

ModerateH, Urine Eosinophils None seen, Urine Osmolality 560H, Urine Random 

Creatinine [Pending], Urine Random Microalbumin [Pending], Urine Random Sodium 

31, Urine Microalbumin/Creatinine Ratio [Pending], Urine Opiates Screen 

PositiveH, Urine Barbiturates Screen Negative, Phencyclidine (PCP) Screen 

Negative, Urine Amphetamines Screen Negative, Urine Benzodiazepines Screen 

PositiveH, Urine Cocaine Screen Negative, Urine Marijuana (THC) Screen PositiveH


11/14/19 15:36: 


Uric Acid 2.4L, Total Creatine Kinase 1051H


11/15/19 03:24: 


Total Creatine Kinase 675H, White Blood Count 16.1#H, Red Blood Count 5.70, 

Hemoglobin 15.7, Hematocrit 47.0, Mean Corpuscular Volume 82, Mean Corpuscular 

Hemoglobin 27.6, Mean Corpuscular Hemoglobin Concent 33.5, Red Cell 

Distribution Width 11.4L, Platelet Count 133L, Mean Platelet Volume 8.7, 

Neutrophils (%) (Auto) , Lymphocytes (%) (Auto) , Monocytes (%) (Auto) , 

Eosinophils (%) (Auto) , Basophils (%) (Auto) , Differential Total Cells 

Counted 100, Neutrophils % (Manual) 84H, Lymphocytes % (Manual) 6L, Monocytes % 

(Manual) 10, Eosinophils % (Manual) 0, Basophils % (Manual) 0, Band Neutrophils 

0, Platelet Estimate DecreasedL, Platelet Morphology Normal, Erythrocyte 

Sedimentation Rate 17, Sodium Level 138, Potassium Level 4.2, Chloride Level 102

, Carbon Dioxide Level 27, Anion Gap 9, Blood Urea Nitrogen 20H, Creatinine 1.2

, Estimat Glomerular Filtration Rate > 60, Glucose Level 137H, Calcium Level 8.6

, Magnesium Level 2.2, Total Bilirubin 0.3, Aspartate Amino Transf (AST/SGOT) 

47H, Alanine Aminotransferase (ALT/SGPT) 40, Alkaline Phosphatase 58, Troponin 

I 0.000, C-Reactive Protein, Quantitative 3.0H, Pro-B-Type Natriuretic Peptide 

388H, Total Protein 7.3, Albumin 3.3L, Globulin 4.0, Albumin/Globulin Ratio 0.8L





Current Medications








 Medications


  (Trade)  Dose


 Ordered  Sig/Zana


 Route


 PRN Reason  Start Time


 Stop Time Status Last Admin


Dose Admin


 


 Albuterol/


 Ipratropium


  (Albuterol/


 Ipratropium)  3 ml  Q4H  PRN


 HHN


 dyspnea  11/13/19 21:15


 11/18/19 21:14  11/15/19 03:50


 


 


 Cyclobenzaprine


 HCl


  (Flexeril)  10 mg  HSPRN  PRN


 ORAL


 muscle spasm  11/13/19 21:15


 12/13/19 21:14   


 


 


 Dextrose


  (Dextrose 50%)  25 ml  Q30M  PRN


 IV


 Hypoglycemia  11/13/19 21:15


 12/13/19 21:14   


 


 


 Dextrose


  (Dextrose 50%)  50 ml  Q30M  PRN


 IV


 Hypoglycemia  11/13/19 21:15


 12/13/19 21:14   


 


 


 Docusate Sodium


  (Colace)  100 mg  THREE TIMES A  DAY


 ORAL


   11/14/19 18:00


 12/14/19 17:59   


 


 


 Heparin Sodium


  (Porcine)


  (Heparin 5000


 units/ml)  5,000 units  EVERY 12  HOURS


 SUBQ


   11/14/19 09:00


 12/14/19 08:59  11/14/19 08:24


 


 


 Lorazepam


  (Ativan 2mg/ml


 1ml)  0.5 mg  Q4H  PRN


 IV


 For Anxiety  11/13/19 21:15


 11/20/19 21:14   


 


 


 Methocarbamol


  (Robaxin)  500 mg  TID


 ORAL


   11/14/19 09:00


 12/14/19 08:59  11/15/19 12:44


 


 


 Methylprednisolone


 Sodium Succinate


  (Solu-MEDROL)  60 mg  DAILY


 IV


   11/15/19 09:00


 12/14/19 00:00  11/15/19 08:54


 


 


 Morphine Sulfate


  (Morphine


 Sulfate)  2 mg  Q4H  PRN


 IVP


 severe pain 7-10  11/13/19 21:15


 11/20/19 21:14   


 


 


 Nitroglycerin


  (Ntg)  0.4 mg  Q5M X 3 DOSES PRN


 SL


 Prn Chest Pain  11/13/19 21:15


 12/13/19 21:14   


 


 


 Ondansetron HCl


  (Zofran)  4 mg  Q6H  PRN


 IVP


 Nausea & Vomiting  11/13/19 21:15


 12/13/19 21:14   


 


 


 Oxycodone/


 Acetaminophen


  (Percocet 5-325)  1 tab  Q6H  PRN


 ORAL


 Moderate Pain (Pain Scale 4-6)  11/13/19 21:15


 11/20/19 21:14  11/14/19 18:04


 


 


 Pantoprazole


  (Protonix)  40 mg  EVERY 12  HOURS


 ORAL


   11/14/19 21:00


 12/14/19 20:59  11/15/19 08:54


 


 


 Piperacillin Sod/


 Tazobactam Sod


 3.375 gm/Sodium


 Chloride  110 ml @ 


 27.5 mls/hr  EVERY 8  HOURS


 IVPB


   11/13/19 23:00


 11/18/19 22:59  11/15/19 05:18


 


 


 Promethazine HCl/


 Codeine


  (Phenergan with


 Codeine)  5 ml  Q6H  PRN


 ORAL


 cough  11/13/19 21:15


 12/13/19 21:14  11/15/19 09:34


 


 


 Quetiapine


 Fumarate


  (SEROquel)  100 mg  QHS


 ORAL


   11/13/19 21:45


 12/13/19 21:44  11/14/19 20:26


 


 


 Tamsulosin HCl


  (Flomax)  0.4 mg  BEDTIME


 ORAL


   11/14/19 21:00


 12/14/19 20:59  11/14/19 20:26


 


 


 Temazepam


  (Restoril)  15 mg  HSPRN  PRN


 ORAL


 Insomnia  11/13/19 21:15


 11/20/19 21:14   


 


 


 Theophylline


  (Julien-Dur)  100 mg  EVERY 12  HOURS


 ORAL


   11/14/19 09:00


 12/14/19 08:59  11/15/19 08:54


 


 


 Trazodone HCl


  (Desyrel)  100 mg  BEDTIME


 ORAL


   11/13/19 21:45


 12/13/19 21:44  11/14/19 20:26


 

















Toan Norman MD Nov 15, 2019 13:54

## 2019-11-16 VITALS — DIASTOLIC BLOOD PRESSURE: 93 MMHG | SYSTOLIC BLOOD PRESSURE: 152 MMHG

## 2019-11-16 VITALS — SYSTOLIC BLOOD PRESSURE: 139 MMHG | DIASTOLIC BLOOD PRESSURE: 86 MMHG

## 2019-11-16 VITALS — DIASTOLIC BLOOD PRESSURE: 87 MMHG | SYSTOLIC BLOOD PRESSURE: 119 MMHG

## 2019-11-16 VITALS — SYSTOLIC BLOOD PRESSURE: 120 MMHG | DIASTOLIC BLOOD PRESSURE: 72 MMHG

## 2019-11-16 VITALS — SYSTOLIC BLOOD PRESSURE: 128 MMHG | DIASTOLIC BLOOD PRESSURE: 80 MMHG

## 2019-11-16 VITALS — DIASTOLIC BLOOD PRESSURE: 88 MMHG | SYSTOLIC BLOOD PRESSURE: 134 MMHG

## 2019-11-16 LAB
ADD MANUAL DIFF: NO
ALBUMIN SERPL-MCNC: 3.4 G/DL (ref 3.4–5)
ALBUMIN/GLOB SERPL: 1.1 {RATIO} (ref 1–2.7)
ALP SERPL-CCNC: 51 U/L (ref 46–116)
ALT SERPL-CCNC: 63 U/L (ref 12–78)
ANION GAP SERPL CALC-SCNC: 8 MMOL/L (ref 5–15)
AST SERPL-CCNC: 53 U/L (ref 15–37)
BASOPHILS NFR BLD AUTO: 0.7 % (ref 0–2)
BILIRUB SERPL-MCNC: 0.2 MG/DL (ref 0.2–1)
BUN SERPL-MCNC: 18 MG/DL (ref 7–18)
CALCIUM SERPL-MCNC: 8.4 MG/DL (ref 8.5–10.1)
CHLORIDE SERPL-SCNC: 102 MMOL/L (ref 98–107)
CO2 SERPL-SCNC: 33 MMOL/L (ref 21–32)
CREAT SERPL-MCNC: 1 MG/DL (ref 0.55–1.3)
EOSINOPHIL NFR BLD AUTO: 0 % (ref 0–3)
ERYTHROCYTE [DISTWIDTH] IN BLOOD BY AUTOMATED COUNT: 11.4 % (ref 11.6–14.8)
GLOBULIN SER-MCNC: 3.2 G/DL
HCT VFR BLD CALC: 45 % (ref 42–52)
HGB BLD-MCNC: 15.1 G/DL (ref 14.2–18)
LYMPHOCYTES NFR BLD AUTO: 13.2 % (ref 20–45)
MCV RBC AUTO: 83 FL (ref 80–99)
MONOCYTES NFR BLD AUTO: 9.8 % (ref 1–10)
NEUTROPHILS NFR BLD AUTO: 76.4 % (ref 45–75)
PHOSPHATE SERPL-MCNC: 3.3 MG/DL (ref 2.5–4.9)
PLATELET # BLD: 118 K/UL (ref 150–450)
POTASSIUM SERPL-SCNC: 3.9 MMOL/L (ref 3.5–5.1)
RBC # BLD AUTO: 5.43 M/UL (ref 4.7–6.1)
SODIUM SERPL-SCNC: 142 MMOL/L (ref 136–145)
WBC # BLD AUTO: 10.6 K/UL (ref 4.8–10.8)

## 2019-11-16 RX ADMIN — THEOPHYLLINE ANHYDROUS SCH MG: 100 CAPSULE, EXTENDED RELEASE ORAL at 08:47

## 2019-11-16 RX ADMIN — METHOCARBAMOL SCH MG: 500 TABLET, FILM COATED ORAL at 13:30

## 2019-11-16 RX ADMIN — HEPARIN SODIUM SCH UNITS: 5000 INJECTION INTRAVENOUS; SUBCUTANEOUS at 21:00

## 2019-11-16 RX ADMIN — IPRATROPIUM BROMIDE AND ALBUTEROL SULFATE PRN ML: .5; 3 SOLUTION RESPIRATORY (INHALATION) at 05:22

## 2019-11-16 RX ADMIN — TAMSULOSIN HYDROCHLORIDE SCH MG: 0.4 CAPSULE ORAL at 21:15

## 2019-11-16 RX ADMIN — DEXTROSE MONOHYDRATE SCH MLS/HR: 50 INJECTION, SOLUTION INTRAVENOUS at 15:00

## 2019-11-16 RX ADMIN — DOCUSATE SODIUM SCH MG: 100 CAPSULE, LIQUID FILLED ORAL at 18:00

## 2019-11-16 RX ADMIN — OXYCODONE HYDROCHLORIDE AND ACETAMINOPHEN PRN TAB: 5; 325 TABLET ORAL at 08:49

## 2019-11-16 RX ADMIN — DEXTROSE MONOHYDRATE SCH MLS/HR: 50 INJECTION, SOLUTION INTRAVENOUS at 05:06

## 2019-11-16 RX ADMIN — DEXTROSE MONOHYDRATE SCH MLS/HR: 50 INJECTION, SOLUTION INTRAVENOUS at 22:05

## 2019-11-16 RX ADMIN — PROMETHAZINE HYDROCHLORIDE PRN ML: 6.25 SOLUTION ORAL at 19:48

## 2019-11-16 RX ADMIN — METHOCARBAMOL SCH MG: 500 TABLET, FILM COATED ORAL at 18:16

## 2019-11-16 RX ADMIN — TRAZODONE HYDROCHLORIDE SCH MG: 100 TABLET ORAL at 21:15

## 2019-11-16 RX ADMIN — HEPARIN SODIUM SCH UNITS: 5000 INJECTION INTRAVENOUS; SUBCUTANEOUS at 08:36

## 2019-11-16 RX ADMIN — METHOCARBAMOL SCH MG: 500 TABLET, FILM COATED ORAL at 08:48

## 2019-11-16 RX ADMIN — THEOPHYLLINE ANHYDROUS SCH MG: 100 CAPSULE, EXTENDED RELEASE ORAL at 21:15

## 2019-11-16 RX ADMIN — AZITHROMYCIN DIHYDRATE SCH MLS/HR: 500 INJECTION, POWDER, LYOPHILIZED, FOR SOLUTION INTRAVENOUS at 13:23

## 2019-11-16 RX ADMIN — DOCUSATE SODIUM SCH MG: 100 CAPSULE, LIQUID FILLED ORAL at 08:36

## 2019-11-16 RX ADMIN — DOCUSATE SODIUM SCH MG: 100 CAPSULE, LIQUID FILLED ORAL at 13:00

## 2019-11-16 NOTE — GENERAL PROGRESS NOTE
Assessment/Plan


Problem List:  


(1) Insomnia


ICD Codes:  G47.00 - Insomnia, unspecified


SNOMED:  590411866


(2) ATN (acute tubular necrosis)


ICD Codes:  N17.0 - Acute kidney failure with tubular necrosis


SNOMED:  11024678


(3) Acute exacerbation of chronic obstructive pulmonary disease (COPD)


ICD Codes:  J44.1 - Chronic obstructive pulmonary disease with (acute) 

exacerbation


SNOMED:  814145811


(4) Pneumonia


ICD Codes:  J18.9 - Pneumonia, unspecified organism


SNOMED:  651242986


(5) Emphysema lung


ICD Codes:  J43.9 - Emphysema, unspecified


SNOMED:  41827833


Status:  stable, progressing


Assessment/Plan:


o2 pulm tx abx pt diet cbc bmp am





Subjective


Constitutional:  Reports: weakness


Respiratory:  Reports: shortness of breath


Allergies:  


Coded Allergies:  


     Tuna (Verified  Allergy, Unknown, 6/18/19)


All Systems:  reviewed and negative except above


Subjective


o2nc calm





Objective





Last 24 Hour Vital Signs








  Date Time  Temp Pulse Resp B/P (MAP) Pulse Ox O2 Delivery O2 Flow Rate FiO2


 


11/16/19 08:00 97.3  18 119/87 (98) 96   


 


11/16/19 06:55  79 18  94 Nasal Cannula 2.0 28


 


11/16/19 06:55     94 Nasal Cannula 2.0 28


 


11/16/19 05:32  81 20  96 Nasal Cannula 2.0 28


 


11/16/19 05:22  83 20  93 Room Air  21


 


11/16/19 04:00 97.6  19 120/72 (88) 98   


 


11/16/19 00:00 98.0  19 128/80 (96) 99   


 


11/15/19 21:00      Room Air  


 


11/15/19 20:00 97.1  22 143/80 (101) 98   


 


11/15/19 19:57  82 18  95 Nasal Cannula 2.0 28


 


11/15/19 19:57     95 Nasal Cannula 2.0 28


 


11/15/19 16:15  80 20  98 Nasal Cannula 2.0 28





  78 18  97   


 


11/15/19 16:00 97.7 88 21 134/94 (107) 94   


 


11/15/19 12:00      Room Air  


 


11/15/19 12:00 97.2 83 19 136/79 (98) 92   


 


11/15/19 11:36  77      

















Intake and Output  


 


 11/15/19 11/16/19





 18:59 06:59


 


Intake Total 322.5 ml 350.0 ml


 


Output Total  1450 ml


 


Balance 322.5 ml -1100.0 ml


 


  


 


Intake Oral 240 ml 240 ml


 


IV Total 82.5 ml 110.0 ml


 


Output Urine Total  1450 ml


 


# Voids 1 2








Laboratory Tests


11/16/19 07:00: 


White Blood Count 10.6, Red Blood Count 5.43, Hemoglobin 15.1, Hematocrit 45.0, 

Mean Corpuscular Volume 83, Mean Corpuscular Hemoglobin 27.8, Mean Corpuscular 

Hemoglobin Concent 33.5, Red Cell Distribution Width 11.4L, Platelet Count 118L

, Mean Platelet Volume 8.4, Neutrophils (%) (Auto) 76.4H, Lymphocytes (%) (Auto

) 13.2L, Monocytes (%) (Auto) 9.8, Eosinophils (%) (Auto) 0.0, Basophils (%) (

Auto) 0.7, Erythrocyte Sedimentation Rate 16, Sodium Level 142, Potassium Level 

3.9, Chloride Level 102, Carbon Dioxide Level 33H, Anion Gap 8, Blood Urea 

Nitrogen 18, Creatinine 1.0, Estimat Glomerular Filtration Rate > 60, Glucose 

Level 91, Calcium Level 8.4L, Phosphorus Level 3.3, Magnesium Level 2.0, Total 

Bilirubin 0.2, Aspartate Amino Transf (AST/SGOT) 53H, Alanine Aminotransferase (

ALT/SGPT) 63, Alkaline Phosphatase 51, C-Reactive Protein, Quantitative 1.0H, 

Total Protein 6.6, Albumin 3.4, Globulin 3.2, Albumin/Globulin Ratio 1.1


Height (Feet):  6


Height (Inches):  2.00


Weight (Pounds):  191


General Appearance:  lethargic


EENT:  normal ENT inspection


Neck:  normal alignment


Cardiovascular:  normal peripheral pulses, normal rate, regular rhythm


Respiratory/Chest:  chest wall non-tender, normal breath sounds, expiratory 

wheezing


Abdomen:  normal bowel sounds, non tender, soft


Extremities:  normal inspection


Edema:  no edema noted Arm (L), no edema noted Arm (R), no edema noted Leg (L), 

no edema noted Leg (R), no edema noted Pedal (L), no edema noted Pedal (R), no 

edema noted Generalized


Neurologic:  responsive, motor weakness


Skin:  normal pigmentation, warm/dry











Juan M Uriarte DO Nov 16, 2019 10:00

## 2019-11-16 NOTE — CARDIOLOGY PROGRESS NOTE
Assessment/Plan


Problem List:  


(1) Dyspnea


(2) Acute exacerbation of chronic obstructive pulmonary disease (COPD)


(3) Emphysema lung


Status:  not improved, unchanged


Status Narrative


Pt w/ elevated CK but normal troponins and ECHO showing no wall motion 

abnormalities


Dyspnea due to COPD exacerbation, possible pneumonia


Assessment/Plan


continue as per pulm - steroids, bronchodilators, abx, and suppl o2 as needed.


No evidence for CHF or ACS currently.





Subjective


ROS Limited/Unobtainable:  No


Subjective


Cardiology for Dr. Lo


Pt w/ congested cough, white sputum. Dyspneic w/ walking in room


No chest pain





Objective





Last 24 Hour Vital Signs








  Date Time  Temp Pulse Resp B/P (MAP) Pulse Ox O2 Delivery O2 Flow Rate FiO2


 


11/16/19 12:00 96.8 71 18 152/93 (112) 93   


 


11/16/19 09:00      Room Air  


 


11/16/19 08:00 97.3  18 119/87 (98) 96   


 


11/16/19 06:55  79 18  94 Nasal Cannula 2.0 28


 


11/16/19 06:55     94 Nasal Cannula 2.0 28


 


11/16/19 05:32  81 20  96 Nasal Cannula 2.0 28


 


11/16/19 05:22  83 20  93 Room Air  21


 


11/16/19 04:00 97.6  19 120/72 (88) 98   


 


11/16/19 00:00 98.0  19 128/80 (96) 99   


 


11/15/19 21:00      Room Air  


 


11/15/19 20:00 97.1  22 143/80 (101) 98   


 


11/15/19 19:57  82 18  95 Nasal Cannula 2.0 28


 


11/15/19 19:57     95 Nasal Cannula 2.0 28








General Appearance:  WD/WN, mild distress


EENT:  PERRL/EOMI


Neck:  normal alignment, no JVD


Rhythm:  NSR


Cardiovascular:  normal rate, regular rhythm, no gallop/murmur


Respiratory/Chest:  other - bilateral rhonchi and expir wheezes


Abdomen:  non tender, soft


Extremities:  no swelling











Intake and Output  


 


 11/15/19 11/16/19





 19:00 07:00


 


Intake Total 322.5 ml 350.0 ml


 


Output Total  1450 ml


 


Balance 322.5 ml -1100.0 ml


 


  


 


Intake Oral 240 ml 240 ml


 


IV Total 82.5 ml 110.0 ml


 


Output Urine Total  1450 ml


 


# Voids 1 2











Laboratory Tests








Test


  11/16/19


07:00


 


White Blood Count


  10.6 K/UL


(4.8-10.8)


 


Red Blood Count


  5.43 M/UL


(4.70-6.10)


 


Hemoglobin


  15.1 G/DL


(14.2-18.0)


 


Hematocrit


  45.0 %


(42.0-52.0)


 


Mean Corpuscular Volume 83 FL (80-99)  


 


Mean Corpuscular Hemoglobin


  27.8 PG


(27.0-31.0)


 


Mean Corpuscular Hemoglobin


Concent 33.5 G/DL


(32.0-36.0)


 


Red Cell Distribution Width


  11.4 %


(11.6-14.8)  L


 


Platelet Count


  118 K/UL


(150-450)  L


 


Mean Platelet Volume


  8.4 FL


(6.5-10.1)


 


Neutrophils (%) (Auto)


  76.4 %


(45.0-75.0)  H


 


Lymphocytes (%) (Auto)


  13.2 %


(20.0-45.0)  L


 


Monocytes (%) (Auto)


  9.8 %


(1.0-10.0)


 


Eosinophils (%) (Auto)


  0.0 %


(0.0-3.0)


 


Basophils (%) (Auto)


  0.7 %


(0.0-2.0)


 


Erythrocyte Sedimentation Rate


  16 MM/HR


(0-20)


 


Sodium Level


  142 MMOL/L


(136-145)


 


Potassium Level


  3.9 MMOL/L


(3.5-5.1)


 


Chloride Level


  102 MMOL/L


()


 


Carbon Dioxide Level


  33 MMOL/L


(21-32)  H


 


Anion Gap


  8 mmol/L


(5-15)


 


Blood Urea Nitrogen


  18 mg/dL


(7-18)


 


Creatinine


  1.0 MG/DL


(0.55-1.30)


 


Estimat Glomerular Filtration


Rate > 60 mL/min


(>60)


 


Glucose Level


  91 MG/DL


()


 


Calcium Level


  8.4 MG/DL


(8.5-10.1)  L


 


Phosphorus Level


  3.3 MG/DL


(2.5-4.9)


 


Magnesium Level


  2.0 MG/DL


(1.8-2.4)


 


Total Bilirubin


  0.2 MG/DL


(0.2-1.0)


 


Aspartate Amino Transf


(AST/SGOT) 53 U/L (15-37)


H


 


Alanine Aminotransferase


(ALT/SGPT) 63 U/L (12-78)


 


 


Alkaline Phosphatase


  51 U/L


()


 


C-Reactive Protein,


Quantitative 1.0 mg/dL


(0.00-0.90)  H


 


Total Protein


  6.6 G/DL


(6.4-8.2)


 


Albumin


  3.4 G/DL


(3.4-5.0)


 


Globulin 3.2 g/dL  


 


Albumin/Globulin Ratio 1.1 (1.0-2.7)  











Microbiology








 Date/Time


Source Procedure


Growth Status


 


 


 11/13/19 17:20


Blood Blood Culture - Preliminary


NO GROWTH AFTER 48 HOURS Resulted


 


 11/13/19 17:05


Blood Blood Culture - Preliminary


NO GROWTH AFTER 48 HOURS Resulted





 11/14/19 17:05


Nose - Final Complete


 


 11/14/19 17:05


Nose - Final Complete





 11/14/19 04:00


Sputum Gram Stain - Final Complete


 


 11/14/19 04:00


Sputum Sputum Culture - Final


NORMAL UPPER RESPIRATORY LÓPEZ PRESENT Complete

















Caroline Ahn MD Nov 16, 2019 16:19

## 2019-11-16 NOTE — NUR
NURSE NOTES:

Patient is awake and alert,02 0n at 2L N/C,respirations unlabored at this time.Patient ate 
breakfast.Call light within reach.

## 2019-11-16 NOTE — CARDIOLOGY REPORT
--------------- APPROVED REPORT --------------





EKG Measurement

Heart [id number]

AZ [id number]

[id number]

[id number]

[id number]





Normal sinus rhythm

Normal ECG

## 2019-11-16 NOTE — NUR
NURSE NOTES:

received pt in bed. AAO X4. oxygen therapy nasal cannula 2ml/min, no respiratory distress 
noted. no c/o pain. call light within reach. bed is the lowest position. will continue to 
provide plan of care.

## 2019-11-16 NOTE — INFECTIOUS DISEASES PROG NOTE
Assessment/Plan


65yo gentleman with PMH below presents to ED with productive cough with thick 

green sputum. Last week, started having sniffles and worsened cough. Got Z 

pack. Took Z pack. No improvement so he came to ED. Reports greenish sputum, SOB

, LEMOS, sniffly, congestion. Watery diarrhea since starting Z ankit. No rash. No 

body aches. Denies fever or chills at home. No dysuria, hematuria. 


Daughter also had a cold but his symptoms started before he met up with his 

daughter. 





Tmax 100.8, Sp 


No leukocytosis


COPD exacerbation vs CAP


   flu swab negative


   CXR: The lungs are hyperexpanded. Reticular densities again noted within 

perihilar and basilar aspects of the lungs. Heart size is stable. Bones are 

osteopenic.


   11/14 sputum cx: P





r/o bacteremia


   11/13 BCx: P





Unlikely UTI


   UA negative








Plan:





continue Zosyn # 4/7  and azithromycin # 2/5...f/u sputum cx


   SP Ceftriaxone 11/13





f/u bcx


f/u sputum cx


pulmonary toilet, breathing treatment, methylprednisolone per pulm





Subjective


Allergies:  


Coded Allergies:  


     Tuna (Verified  Allergy, Unknown, 6/18/19)


Subjective


afebrile





Objective


Vital Signs





Last 24 Hour Vital Signs








  Date Time  Temp Pulse Resp B/P (MAP) Pulse Ox O2 Delivery O2 Flow Rate FiO2


 


11/16/19 08:00 97.3  18 119/87 (98) 96   


 


11/16/19 06:55  79 18  94 Nasal Cannula 2.0 28


 


11/16/19 06:55     94 Nasal Cannula 2.0 28


 


11/16/19 05:32  81 20  96 Nasal Cannula 2.0 28


 


11/16/19 05:22  83 20  93 Room Air  21


 


11/16/19 04:00 97.6  19 120/72 (88) 98   


 


11/16/19 00:00 98.0  19 128/80 (96) 99   


 


11/15/19 21:00      Room Air  


 


11/15/19 20:00 97.1  22 143/80 (101) 98   


 


11/15/19 19:57  82 18  95 Nasal Cannula 2.0 28


 


11/15/19 19:57     95 Nasal Cannula 2.0 28


 


11/15/19 16:15  80 20  98 Nasal Cannula 2.0 28





  78 18  97   


 


11/15/19 16:00 97.7 88 21 134/94 (107) 94   


 


11/15/19 12:00      Room Air  


 


11/15/19 12:00 97.2 83 19 136/79 (98) 92   


 


11/15/19 11:36  77      








Height (Feet):  6


Height (Inches):  2.00


Weight (Pounds):  191


HEENT:  anicteric


Respiratory/Chest:  no respiratory distress


Cardiovascular:  regular rhythm


Abdomen:  no organomegaly





Microbiology








 Date/Time


Source Procedure


Growth Status


 


 


 11/13/19 17:20


Blood Blood Culture - Preliminary


NO GROWTH AFTER 48 HOURS Resulted


 


 11/13/19 17:05


Blood Blood Culture - Preliminary


NO GROWTH AFTER 48 HOURS Resulted





 11/14/19 17:05


Nose - Final Complete


 


 11/14/19 17:05


Nose - Final Complete





 11/14/19 04:00


Sputum Gram Stain - Final Complete


 


 11/14/19 04:00


Sputum Sputum Culture - Final


NORMAL UPPER RESPIRATORY LÓPEZ PRESENT Complete











Laboratory Tests








Test


  11/16/19


07:00


 


White Blood Count


  10.6 K/UL


(4.8-10.8)


 


Red Blood Count


  5.43 M/UL


(4.70-6.10)


 


Hemoglobin


  15.1 G/DL


(14.2-18.0)


 


Hematocrit


  45.0 %


(42.0-52.0)


 


Mean Corpuscular Volume 83 FL (80-99)  


 


Mean Corpuscular Hemoglobin


  27.8 PG


(27.0-31.0)


 


Mean Corpuscular Hemoglobin


Concent 33.5 G/DL


(32.0-36.0)


 


Red Cell Distribution Width


  11.4 %


(11.6-14.8)  L


 


Platelet Count


  118 K/UL


(150-450)  L


 


Mean Platelet Volume


  8.4 FL


(6.5-10.1)


 


Neutrophils (%) (Auto)


  76.4 %


(45.0-75.0)  H


 


Lymphocytes (%) (Auto)


  13.2 %


(20.0-45.0)  L


 


Monocytes (%) (Auto)


  9.8 %


(1.0-10.0)


 


Eosinophils (%) (Auto)


  0.0 %


(0.0-3.0)


 


Basophils (%) (Auto)


  0.7 %


(0.0-2.0)


 


Erythrocyte Sedimentation Rate Pending  


 


Sodium Level


  142 MMOL/L


(136-145)


 


Potassium Level


  3.9 MMOL/L


(3.5-5.1)


 


Chloride Level


  102 MMOL/L


()


 


Carbon Dioxide Level


  33 MMOL/L


(21-32)  H


 


Anion Gap


  8 mmol/L


(5-15)


 


Blood Urea Nitrogen


  18 mg/dL


(7-18)


 


Creatinine


  1.0 MG/DL


(0.55-1.30)


 


Estimat Glomerular Filtration


Rate > 60 mL/min


(>60)


 


Glucose Level


  91 MG/DL


()


 


Calcium Level


  8.4 MG/DL


(8.5-10.1)  L


 


Phosphorus Level


  3.3 MG/DL


(2.5-4.9)


 


Magnesium Level


  2.0 MG/DL


(1.8-2.4)


 


Total Bilirubin


  0.2 MG/DL


(0.2-1.0)


 


Aspartate Amino Transf


(AST/SGOT) 53 U/L (15-37)


H


 


Alanine Aminotransferase


(ALT/SGPT) 63 U/L (12-78)


 


 


Alkaline Phosphatase


  51 U/L


()


 


C-Reactive Protein,


Quantitative 1.0 mg/dL


(0.00-0.90)  H


 


Total Protein


  6.6 G/DL


(6.4-8.2)


 


Albumin


  3.4 G/DL


(3.4-5.0)


 


Globulin 3.2 g/dL  


 


Albumin/Globulin Ratio 1.1 (1.0-2.7)  











Current Medications








 Medications


  (Trade)  Dose


 Ordered  Sig/Zana


 Route


 PRN Reason  Start Time


 Stop Time Status Last Admin


Dose Admin


 


 Albuterol/


 Ipratropium


  (Albuterol/


 Ipratropium)  3 ml  Q4H  PRN


 HHN


 dyspnea  11/15/19 17:15


 11/18/19 21:14  11/16/19 05:22


 


 


 Cyclobenzaprine


 HCl


  (Flexeril)  10 mg  HSPRN  PRN


 ORAL


 muscle spasm  11/15/19 21:15


 12/13/19 21:14   


 


 


 Dextrose


  (Dextrose 50%)  25 ml  Q30M  PRN


 IV


 Hypoglycemia  11/15/19 14:15


 12/13/19 21:14   


 


 


 Dextrose


  (Dextrose 50%)  50 ml  Q30M  PRN


 IV


 Hypoglycemia  11/15/19 14:15


 12/13/19 21:14   


 


 


 Docusate Sodium


  (Colace)  100 mg  THREE TIMES A  DAY


 ORAL


   11/15/19 18:00


 12/14/19 17:59   


 


 


 Heparin Sodium


  (Porcine)


  (Heparin 5000


 units/ml)  5,000 units  EVERY 12  HOURS


 SUBQ


   11/15/19 21:00


 12/14/19 08:59   


 


 


 Lorazepam


  (Ativan 2mg/ml


 1ml)  0.5 mg  Q4H  PRN


 IV


 For Anxiety  11/15/19 17:15


 11/20/19 21:14   


 


 


 Methocarbamol


  (Robaxin)  500 mg  TID


 ORAL


   11/15/19 18:00


 12/14/19 08:59  11/15/19 17:51


 


 


 Methylprednisolone


 Sodium Succinate


  (Solu-MEDROL)  60 mg  DAILY


 IV


   11/16/19 09:00


 12/14/19 00:00   


 


 


 Morphine Sulfate


  (Morphine


 Sulfate)  2 mg  Q4H  PRN


 IVP


 severe pain 7-10  11/15/19 17:15


 11/20/19 21:14   


 


 


 Nitroglycerin


  (Ntg)  0.4 mg  Q5M X 3 DOSES PRN


 SL


 Prn Chest Pain  11/15/19 14:00


 12/13/19 21:14   


 


 


 Ondansetron HCl


  (Zofran)  4 mg  Q6H  PRN


 IVP


 Nausea & Vomiting  11/15/19 15:15


 12/13/19 21:14   


 


 


 Oxycodone/


 Acetaminophen


  (Percocet 5-325)  1 tab  Q6H  PRN


 ORAL


 Moderate Pain (Pain Scale 4-6)  11/15/19 15:15


 11/20/19 21:14  11/15/19 17:52


 


 


 Pantoprazole


  (Protonix)  40 mg  EVERY 12  HOURS


 ORAL


   11/15/19 21:00


 12/14/19 20:59  11/15/19 20:23


 


 


 Piperacillin Sod/


 Tazobactam Sod


 3.375 gm/Sodium


 Chloride  110 ml @ 


 27.5 mls/hr  EVERY 8  HOURS


 IVPB


   11/15/19 15:00


 11/18/19 14:59  11/16/19 05:06


 


 


 Promethazine HCl/


 Codeine


  (Phenergan with


 Codeine)  5 ml  Q6H  PRN


 ORAL


 cough  11/15/19 15:15


 12/13/19 21:14   


 


 


 Quetiapine


 Fumarate


  (SEROqueL)  100 mg  QHS


 ORAL


   11/15/19 21:00


 12/13/19 21:44  11/15/19 20:22


 


 


 Tamsulosin HCl


  (Flomax)  0.4 mg  BEDTIME


 ORAL


   11/15/19 21:00


 12/14/19 20:59  11/15/19 20:21


 


 


 Temazepam


  (Restoril)  15 mg  HSPRN  PRN


 ORAL


 Insomnia  11/15/19 21:15


 11/20/19 21:14   


 


 


 Theophylline


  (Julien-Dur)  100 mg  EVERY 12  HOURS


 ORAL


   11/15/19 21:00


 12/14/19 08:59  11/15/19 20:22


 


 


 Trazodone HCl


  (Desyrel)  100 mg  BEDTIME


 ORAL


   11/15/19 21:00


 12/13/19 21:44  11/15/19 20:24


 

















Cesar Hui MD Nov 16, 2019 08:58

## 2019-11-16 NOTE — NEPHROLOGY PROGRESS NOTE
Assessment/Plan


Problem List:  


(1) ATN (acute tubular necrosis)


(2) COPD exacerbation


(3) Drug abuse


(4) Rhabdomyolysis


Assessment:  mild 





Assessment





ATN (acute tubular necrosis)- was on NSAIDs


Acute exacerbation of chronic obstructive pulmonary disease (COPD)


Emphysema lung


h/o HCV


Plan





Urine + Opiates, Benzo, MJ





Hold Motrin


Optimize pulmonary status


Urine studies


Urine tox screen


monitor renal parameters


2D echo


per orders





Subjective


ROS Limited/Unobtainable:  No


Constitutional:  Reports: malaise





Objective


Objective





Last 24 Hour Vital Signs








  Date Time  Temp Pulse Resp B/P (MAP) Pulse Ox O2 Delivery O2 Flow Rate FiO2


 


11/16/19 08:00 97.3  18 119/87 (98) 96   


 


11/16/19 06:55  79 18  94 Nasal Cannula 2.0 28


 


11/16/19 06:55     94 Nasal Cannula 2.0 28


 


11/16/19 05:32  81 20  96 Nasal Cannula 2.0 28


 


11/16/19 05:22  83 20  93 Room Air  21


 


11/16/19 04:00 97.6  19 120/72 (88) 98   


 


11/16/19 00:00 98.0  19 128/80 (96) 99   


 


11/15/19 21:00      Room Air  


 


11/15/19 20:00 97.1  22 143/80 (101) 98   


 


11/15/19 19:57  82 18  95 Nasal Cannula 2.0 28


 


11/15/19 19:57     95 Nasal Cannula 2.0 28


 


11/15/19 16:15  80 20  98 Nasal Cannula 2.0 28





  78 18  97   


 


11/15/19 16:00 97.7 88 21 134/94 (107) 94   

















Intake and Output  


 


 11/15/19 11/16/19





 18:59 06:59


 


Intake Total 322.5 ml 350.0 ml


 


Output Total  1450 ml


 


Balance 322.5 ml -1100.0 ml


 


  


 


Intake Oral 240 ml 240 ml


 


IV Total 82.5 ml 110.0 ml


 


Output Urine Total  1450 ml


 


# Voids 1 2








Laboratory Tests


11/16/19 07:00: 


White Blood Count 10.6, Red Blood Count 5.43, Hemoglobin 15.1, Hematocrit 45.0, 

Mean Corpuscular Volume 83, Mean Corpuscular Hemoglobin 27.8, Mean Corpuscular 

Hemoglobin Concent 33.5, Red Cell Distribution Width 11.4L, Platelet Count 118L

, Mean Platelet Volume 8.4, Neutrophils (%) (Auto) 76.4H, Lymphocytes (%) (Auto

) 13.2L, Monocytes (%) (Auto) 9.8, Eosinophils (%) (Auto) 0.0, Basophils (%) (

Auto) 0.7, Erythrocyte Sedimentation Rate 16, Sodium Level 142, Potassium Level 

3.9, Chloride Level 102, Carbon Dioxide Level 33H, Anion Gap 8, Blood Urea 

Nitrogen 18, Creatinine 1.0, Estimat Glomerular Filtration Rate > 60, Glucose 

Level 91, Calcium Level 8.4L, Phosphorus Level 3.3, Magnesium Level 2.0, Total 

Bilirubin 0.2, Aspartate Amino Transf (AST/SGOT) 53H, Alanine Aminotransferase (

ALT/SGPT) 63, Alkaline Phosphatase 51, C-Reactive Protein, Quantitative 1.0H, 

Total Protein 6.6, Albumin 3.4, Globulin 3.2, Albumin/Globulin Ratio 1.1


Height (Feet):  6


Height (Inches):  2.00


Weight (Pounds):  191


General Appearance:  no apparent distress


Objective


no change











Shade Gray MD Nov 16, 2019 12:03

## 2019-11-16 NOTE — PULMONOLOGY PROGRESS NOTE
Assessment/Plan


Assessment/Plan


ASSESSMENT


COPD exacerbation  


Possible pneumonia


Acute kidney injury


Emphysema


Electrolyte imbalance : hyponatremia hypokalemia


Elevated AST





PLAN OF CARE


MS floor


O2 titrate, HHN PRN


IV steroids and taper


empiric abx 


SCX negative, BCX NGTD ,   influenza swab negative


fup with CXR 


antitussive PRN


trial of theophylline


DVT prophylaxis


Echo with pEF


monitor renal parameters , electrolytes,  correct electrolytes as needed , 

avoid nephrotoxic 


Na and K stable this am 


renal US negative  





case discussed and evaluated by supervising physician





Subjective


Allergies:  


Coded Allergies:  


     Tuna (Verified  Allergy, Unknown, 6/18/19)


Subjective


still congested, wheezing


no CP 


leukocytosis resolved ? reactive





Objective





Last 24 Hour Vital Signs








  Date Time  Temp Pulse Resp B/P (MAP) Pulse Ox O2 Delivery O2 Flow Rate FiO2


 


11/16/19 06:55  79 18  94 Nasal Cannula 2.0 28


 


11/16/19 06:55     94 Nasal Cannula 2.0 28


 


11/16/19 05:32  81 20  96 Nasal Cannula 2.0 28


 


11/16/19 05:22  83 20  93 Room Air  21


 


11/16/19 04:00 97.6  19 120/72 (88) 98   


 


11/16/19 00:00 98.0  19 128/80 (96) 99   


 


11/15/19 21:00      Room Air  


 


11/15/19 20:00 97.1  22 143/80 (101) 98   


 


11/15/19 19:57  82 18  95 Nasal Cannula 2.0 28


 


11/15/19 19:57     95 Nasal Cannula 2.0 28


 


11/15/19 16:15  80 20  98 Nasal Cannula 2.0 28





  78 18  97   


 


11/15/19 16:00 97.7 88 21 134/94 (107) 94   


 


11/15/19 12:00      Room Air  


 


11/15/19 12:00 97.2 83 19 136/79 (98) 92   


 


11/15/19 11:36  77      


 


11/15/19 08:00 97.2 86 22 135/70 (91) 90   


 


11/15/19 08:00      Nasal Cannula 2.0 

















Intake and Output  


 


 11/15/19 11/16/19





 18:59 06:59


 


Intake Total 322.5 ml 350.0 ml


 


Output Total  1450 ml


 


Balance 322.5 ml -1100.0 ml


 


  


 


Intake Oral 240 ml 240 ml


 


IV Total 82.5 ml 110.0 ml


 


Output Urine Total  1450 ml


 


# Voids 1 2








General Appearance:  no acute distress, other - A/A/O x 4 AA male


HEENT:  normocephalic, atraumatic, anicteric, mucous membranes moist


Respiratory/Chest:  expiratory wheezing - scatetred bilaterally


Cardiovascular:  normal rate, no gallop/murmur, no JVD


Abdomen:  normal bowel sounds, soft, non tender


Extremities:  no edema, pedal pulses normal


Neurologic/Psychiatric:  no motor/sensory deficits, alert, oriented x 3, 

responsive


Musculoskeletal:  normal muscle bulk





Microbiology








 Date/Time


Source Procedure


Growth Status


 


 


 11/13/19 17:20


Blood Blood Culture - Preliminary


NO GROWTH AFTER 48 HOURS Resulted


 


 11/13/19 17:05


Blood Blood Culture - Preliminary


NO GROWTH AFTER 48 HOURS Resulted





 11/14/19 17:05


Nose - Final Complete


 


 11/14/19 17:05


Nose - Final Complete





 11/14/19 04:00


Sputum Gram Stain - Final Complete


 


 11/14/19 04:00


Sputum Sputum Culture - Final


NORMAL UPPER RESPIRATORY LÓPEZ PRESENT Complete











Current Medications








 Medications


  (Trade)  Dose


 Ordered  Sig/Zana


 Route


 PRN Reason  Start Time


 Stop Time Status Last Admin


Dose Admin


 


 Albuterol/


 Ipratropium


  (Albuterol/


 Ipratropium)  3 ml  Q4H  PRN


 HHN


 dyspnea  11/15/19 17:15


 11/18/19 21:14  11/16/19 05:22


 


 


 Cyclobenzaprine


 HCl


  (Flexeril)  10 mg  HSPRN  PRN


 ORAL


 muscle spasm  11/15/19 21:15


 12/13/19 21:14   


 


 


 Dextrose


  (Dextrose 50%)  25 ml  Q30M  PRN


 IV


 Hypoglycemia  11/15/19 14:15


 12/13/19 21:14   


 


 


 Dextrose


  (Dextrose 50%)  50 ml  Q30M  PRN


 IV


 Hypoglycemia  11/15/19 14:15


 12/13/19 21:14   


 


 


 Docusate Sodium


  (Colace)  100 mg  THREE TIMES A  DAY


 ORAL


   11/15/19 18:00


 12/14/19 17:59   


 


 


 Heparin Sodium


  (Porcine)


  (Heparin 5000


 units/ml)  5,000 units  EVERY 12  HOURS


 SUBQ


   11/15/19 21:00


 12/14/19 08:59   


 


 


 Lorazepam


  (Ativan 2mg/ml


 1ml)  0.5 mg  Q4H  PRN


 IV


 For Anxiety  11/15/19 17:15


 11/20/19 21:14   


 


 


 Methocarbamol


  (Robaxin)  500 mg  TID


 ORAL


   11/15/19 18:00


 12/14/19 08:59  11/15/19 17:51


 


 


 Methylprednisolone


 Sodium Succinate


  (Solu-MEDROL)  60 mg  DAILY


 IV


   11/16/19 09:00


 12/14/19 00:00   


 


 


 Morphine Sulfate


  (Morphine


 Sulfate)  2 mg  Q4H  PRN


 IVP


 severe pain 7-10  11/15/19 17:15


 11/20/19 21:14   


 


 


 Nitroglycerin


  (Ntg)  0.4 mg  Q5M X 3 DOSES PRN


 SL


 Prn Chest Pain  11/15/19 14:00


 12/13/19 21:14   


 


 


 Ondansetron HCl


  (Zofran)  4 mg  Q6H  PRN


 IVP


 Nausea & Vomiting  11/15/19 15:15


 12/13/19 21:14   


 


 


 Oxycodone/


 Acetaminophen


  (Percocet 5-325)  1 tab  Q6H  PRN


 ORAL


 Moderate Pain (Pain Scale 4-6)  11/15/19 15:15


 11/20/19 21:14  11/15/19 17:52


 


 


 Pantoprazole


  (Protonix)  40 mg  EVERY 12  HOURS


 ORAL


   11/15/19 21:00


 12/14/19 20:59  11/15/19 20:23


 


 


 Piperacillin Sod/


 Tazobactam Sod


 3.375 gm/Sodium


 Chloride  110 ml @ 


 27.5 mls/hr  EVERY 8  HOURS


 IVPB


   11/15/19 15:00


 11/18/19 14:59  11/16/19 05:06


 


 


 Promethazine HCl/


 Codeine


  (Phenergan with


 Codeine)  5 ml  Q6H  PRN


 ORAL


 cough  11/15/19 15:15


 12/13/19 21:14   


 


 


 Quetiapine


 Fumarate


  (SEROqueL)  100 mg  QHS


 ORAL


   11/15/19 21:00


 12/13/19 21:44  11/15/19 20:22


 


 


 Tamsulosin HCl


  (Flomax)  0.4 mg  BEDTIME


 ORAL


   11/15/19 21:00


 12/14/19 20:59  11/15/19 20:21


 


 


 Temazepam


  (Restoril)  15 mg  HSPRN  PRN


 ORAL


 Insomnia  11/15/19 21:15


 11/20/19 21:14   


 


 


 Theophylline


  (Julien-Dur)  100 mg  EVERY 12  HOURS


 ORAL


   11/15/19 21:00


 12/14/19 08:59  11/15/19 20:22


 


 


 Trazodone HCl


  (Desyrel)  100 mg  BEDTIME


 ORAL


   11/15/19 21:00


 12/13/19 21:44  11/15/19 20:24


 

















Yoana Marroquin NP Nov 16, 2019 07:52

## 2019-11-17 VITALS — DIASTOLIC BLOOD PRESSURE: 78 MMHG | SYSTOLIC BLOOD PRESSURE: 143 MMHG

## 2019-11-17 VITALS — SYSTOLIC BLOOD PRESSURE: 113 MMHG | DIASTOLIC BLOOD PRESSURE: 82 MMHG

## 2019-11-17 VITALS — DIASTOLIC BLOOD PRESSURE: 77 MMHG | SYSTOLIC BLOOD PRESSURE: 134 MMHG

## 2019-11-17 VITALS — DIASTOLIC BLOOD PRESSURE: 83 MMHG | SYSTOLIC BLOOD PRESSURE: 129 MMHG

## 2019-11-17 VITALS — DIASTOLIC BLOOD PRESSURE: 80 MMHG | SYSTOLIC BLOOD PRESSURE: 113 MMHG

## 2019-11-17 VITALS — DIASTOLIC BLOOD PRESSURE: 77 MMHG | SYSTOLIC BLOOD PRESSURE: 131 MMHG

## 2019-11-17 LAB
ADD MANUAL DIFF: NO
ALBUMIN SERPL-MCNC: 3 G/DL (ref 3.4–5)
ALBUMIN/GLOB SERPL: 0.9 {RATIO} (ref 1–2.7)
ALP SERPL-CCNC: 44 U/L (ref 46–116)
ALT SERPL-CCNC: 75 U/L (ref 12–78)
ANION GAP SERPL CALC-SCNC: 2 MMOL/L (ref 5–15)
AST SERPL-CCNC: 43 U/L (ref 15–37)
BASOPHILS NFR BLD AUTO: 0.9 % (ref 0–2)
BILIRUB SERPL-MCNC: 0.2 MG/DL (ref 0.2–1)
BUN SERPL-MCNC: 18 MG/DL (ref 7–18)
CALCIUM SERPL-MCNC: 8.2 MG/DL (ref 8.5–10.1)
CHLORIDE SERPL-SCNC: 100 MMOL/L (ref 98–107)
CHOLEST SERPL-MCNC: 181 MG/DL (ref ?–200)
CK SERPL-CCNC: 195 U/L (ref 26–308)
CO2 SERPL-SCNC: 34 MMOL/L (ref 21–32)
CREAT SERPL-MCNC: 1.1 MG/DL (ref 0.55–1.3)
EOSINOPHIL NFR BLD AUTO: 0.2 % (ref 0–3)
ERYTHROCYTE [DISTWIDTH] IN BLOOD BY AUTOMATED COUNT: 11.3 % (ref 11.6–14.8)
GAMMA GLUTAMYL TRANSPEPTIDASE: 122 U/L (ref 5–85)
GLOBULIN SER-MCNC: 3.4 G/DL
HCT VFR BLD CALC: 44 % (ref 42–52)
HDLC SERPL-MCNC: 76 MG/DL (ref 40–60)
HGB BLD-MCNC: 14.6 G/DL (ref 14.2–18)
LYMPHOCYTES NFR BLD AUTO: 22.4 % (ref 20–45)
MCV RBC AUTO: 83 FL (ref 80–99)
MONOCYTES NFR BLD AUTO: 12.3 % (ref 1–10)
NEUTROPHILS NFR BLD AUTO: 64.2 % (ref 45–75)
PHOSPHATE SERPL-MCNC: 3.4 MG/DL (ref 2.5–4.9)
PLATELET # BLD: 126 K/UL (ref 150–450)
POTASSIUM SERPL-SCNC: 3.7 MMOL/L (ref 3.5–5.1)
RBC # BLD AUTO: 5.31 M/UL (ref 4.7–6.1)
SODIUM SERPL-SCNC: 136 MMOL/L (ref 136–145)
TRIGL SERPL-MCNC: 99 MG/DL (ref 30–150)
WBC # BLD AUTO: 9.6 K/UL (ref 4.8–10.8)

## 2019-11-17 RX ADMIN — DOCUSATE SODIUM SCH MG: 100 CAPSULE, LIQUID FILLED ORAL at 13:00

## 2019-11-17 RX ADMIN — DEXTROSE MONOHYDRATE SCH MLS/HR: 50 INJECTION, SOLUTION INTRAVENOUS at 05:10

## 2019-11-17 RX ADMIN — THEOPHYLLINE ANHYDROUS SCH MG: 100 CAPSULE, EXTENDED RELEASE ORAL at 20:41

## 2019-11-17 RX ADMIN — OXYCODONE HYDROCHLORIDE AND ACETAMINOPHEN PRN TAB: 5; 325 TABLET ORAL at 14:49

## 2019-11-17 RX ADMIN — DEXTROSE MONOHYDRATE SCH MLS/HR: 50 INJECTION, SOLUTION INTRAVENOUS at 22:09

## 2019-11-17 RX ADMIN — IPRATROPIUM BROMIDE AND ALBUTEROL SULFATE PRN ML: .5; 3 SOLUTION RESPIRATORY (INHALATION) at 10:34

## 2019-11-17 RX ADMIN — DOCUSATE SODIUM SCH MG: 100 CAPSULE, LIQUID FILLED ORAL at 17:58

## 2019-11-17 RX ADMIN — DEXTROSE MONOHYDRATE SCH MLS/HR: 50 INJECTION, SOLUTION INTRAVENOUS at 13:48

## 2019-11-17 RX ADMIN — TAMSULOSIN HYDROCHLORIDE SCH MG: 0.4 CAPSULE ORAL at 20:41

## 2019-11-17 RX ADMIN — DOCUSATE SODIUM SCH MG: 100 CAPSULE, LIQUID FILLED ORAL at 08:40

## 2019-11-17 RX ADMIN — HEPARIN SODIUM SCH UNITS: 5000 INJECTION INTRAVENOUS; SUBCUTANEOUS at 20:40

## 2019-11-17 RX ADMIN — THEOPHYLLINE ANHYDROUS SCH MG: 100 CAPSULE, EXTENDED RELEASE ORAL at 08:42

## 2019-11-17 RX ADMIN — PROMETHAZINE HYDROCHLORIDE PRN ML: 6.25 SOLUTION ORAL at 06:08

## 2019-11-17 RX ADMIN — HEPARIN SODIUM SCH UNITS: 5000 INJECTION INTRAVENOUS; SUBCUTANEOUS at 09:00

## 2019-11-17 RX ADMIN — AZITHROMYCIN DIHYDRATE SCH MLS/HR: 500 INJECTION, POWDER, LYOPHILIZED, FOR SOLUTION INTRAVENOUS at 11:42

## 2019-11-17 RX ADMIN — METHOCARBAMOL SCH MG: 500 TABLET, FILM COATED ORAL at 13:41

## 2019-11-17 RX ADMIN — TRAZODONE HYDROCHLORIDE SCH MG: 100 TABLET ORAL at 20:41

## 2019-11-17 RX ADMIN — METHOCARBAMOL SCH MG: 500 TABLET, FILM COATED ORAL at 18:01

## 2019-11-17 RX ADMIN — METHOCARBAMOL SCH MG: 500 TABLET, FILM COATED ORAL at 08:41

## 2019-11-17 NOTE — NUR
NURSE NOTES:

Patient resting,respirations unlabored,patient state he is starting to feel better.Call 
light within reach.

## 2019-11-17 NOTE — NUR
NURSE NOTES:

 Patient awake and alert and oriented.02 on at 2L N/C respirations unlabored patient sitting 
up in bed and eating  breakfast.Call light within reach.

## 2019-11-17 NOTE — NEPHROLOGY PROGRESS NOTE
Assessment/Plan


Problem List:  


(1) ATN (acute tubular necrosis)


(2) COPD exacerbation


(3) Drug abuse


(4) Rhabdomyolysis


Assessment:  mild 





Assessment





ATN (acute tubular necrosis)- was on NSAIDs


Acute exacerbation of chronic obstructive pulmonary disease (COPD)


Emphysema lung


h/o HCV


Plan





Urine + Opiates, Benzo, MJ





Hold Motrin


Optimize pulmonary status


Urine studies


Urine tox screen


monitor renal parameters


2D echo


per orders





Subjective


ROS Limited/Unobtainable:  No


Constitutional:  Reports: malaise





Objective


Objective





Last 24 Hour Vital Signs








  Date Time  Temp Pulse Resp B/P (MAP) Pulse Ox O2 Delivery O2 Flow Rate FiO2


 


11/17/19 09:06      Nasal Cannula 2.0 


 


11/17/19 08:00 98.8 88 18 143/78 (99) 95   


 


11/17/19 04:04 97.3 76 18 134/77 (96) 96   


 


11/17/19 00:00 97.7 72 20 113/82 (92) 95   


 


11/16/19 21:00      Nasal Cannula 2.0 


 


11/16/19 20:13     96 Nasal Cannula 2.0 28


 


11/16/19 20:13  77 20  96 Nasal Cannula 2.0 28


 


11/16/19 20:00 97.0 72 18 139/86 (103) 98   


 


11/16/19 18:13 97.7 77 18 134/88 (103) 95   


 


11/16/19 12:00 96.8 71 18 152/93 (112) 93   

















Intake and Output  


 


 11/16/19 11/17/19





 18:59 06:59


 


Intake Total 1000 ml 137.5 ml


 


Balance 1000 ml 137.5 ml


 


  


 


Intake Oral 1000 ml 


 


IV Total  137.5 ml


 


# Voids 4 


 


# Bowel Movements 3 








Laboratory Tests


11/17/19 05:15: 


White Blood Count 9.6, Red Blood Count 5.31, Hemoglobin 14.6, Hematocrit 44.0, 

Mean Corpuscular Volume 83, Mean Corpuscular Hemoglobin 27.6, Mean Corpuscular 

Hemoglobin Concent 33.3, Red Cell Distribution Width 11.3L, Platelet Count 126L

, Mean Platelet Volume 7.8, Neutrophils (%) (Auto) 64.2, Lymphocytes (%) (Auto) 

22.4, Monocytes (%) (Auto) 12.3H, Eosinophils (%) (Auto) 0.2, Basophils (%) (

Auto) 0.9, Sodium Level 136, Potassium Level 3.7, Chloride Level 100, Carbon 

Dioxide Level 34H, Anion Gap 2L, Blood Urea Nitrogen 18, Creatinine 1.1, 

Estimat Glomerular Filtration Rate > 60, Glucose Level 95, Uric Acid 2.1L, 

Calcium Level 8.2L, Phosphorus Level 3.4, Magnesium Level 2.1, Total Bilirubin 

0.2, Gamma Glutamyl Transpeptidase 122H, Aspartate Amino Transf (AST/SGOT) 43H, 

Alanine Aminotransferase (ALT/SGPT) 75, Alkaline Phosphatase 44L, Total 

Creatine Kinase 195, Total Protein 6.4, Albumin 3.0L, Globulin 3.4, Albumin/

Globulin Ratio 0.9L, Triglycerides Level 99, Cholesterol Level 181, LDL 

Cholesterol 75, HDL Cholesterol 76H, Cholesterol/HDL Ratio 2.4L


Height (Feet):  6


Height (Inches):  2.00


Weight (Pounds):  191


General Appearance:  no apparent distress


Objective


no change











Shade Gray MD Nov 17, 2019 10:31

## 2019-11-17 NOTE — GENERAL PROGRESS NOTE
Assessment/Plan


Problem List:  


(1) Insomnia


ICD Codes:  G47.00 - Insomnia, unspecified


SNOMED:  677447926


(2) ATN (acute tubular necrosis)


ICD Codes:  N17.0 - Acute kidney failure with tubular necrosis


SNOMED:  97052973


(3) Acute exacerbation of chronic obstructive pulmonary disease (COPD)


ICD Codes:  J44.1 - Chronic obstructive pulmonary disease with (acute) 

exacerbation


SNOMED:  643318376


(4) Pneumonia


ICD Codes:  J18.9 - Pneumonia, unspecified organism


SNOMED:  288726796


(5) Emphysema lung


ICD Codes:  J43.9 - Emphysema, unspecified


SNOMED:  81384716


Status:  stable, progressing


Assessment/Plan:


o2 pulm tx abx pt diet cbc bmp am taper steroids





Subjective


Constitutional:  Reports: weakness


Respiratory:  Reports: shortness of breath, wheezing


Allergies:  


Coded Allergies:  


     Tuna (Verified  Allergy, Unknown, 6/18/19)


All Systems:  reviewed and negative except above


Subjective


o2nc calm





Objective





Last 24 Hour Vital Signs








  Date Time  Temp Pulse Resp B/P (MAP) Pulse Ox O2 Delivery O2 Flow Rate FiO2


 


11/17/19 08:00 98.8 88 18 143/78 (99) 95   


 


11/17/19 04:04 97.3 76 18 134/77 (96) 96   


 


11/17/19 00:00 97.7 72 20 113/82 (92) 95   


 


11/16/19 21:00      Nasal Cannula 2.0 


 


11/16/19 20:13     96 Nasal Cannula 2.0 28


 


11/16/19 20:13  77 20  96 Nasal Cannula 2.0 28


 


11/16/19 20:00 97.0 72 18 139/86 (103) 98   


 


11/16/19 18:13 97.7 77 18 134/88 (103) 95   


 


11/16/19 12:00 96.8 71 18 152/93 (112) 93   


 


11/16/19 09:00      Room Air  

















Intake and Output  


 


 11/16/19 11/17/19





 19:00 07:00


 


Intake Total 1000 ml 137.5 ml


 


Balance 1000 ml 137.5 ml


 


  


 


Intake Oral 1000 ml 


 


IV Total  137.5 ml


 


# Voids 4 


 


# Bowel Movements 3 








Laboratory Tests


11/17/19 05:15: 


White Blood Count 9.6, Red Blood Count 5.31, Hemoglobin 14.6, Hematocrit 44.0, 

Mean Corpuscular Volume 83, Mean Corpuscular Hemoglobin 27.6, Mean Corpuscular 

Hemoglobin Concent 33.3, Red Cell Distribution Width 11.3L, Platelet Count 126L

, Mean Platelet Volume 7.8, Neutrophils (%) (Auto) 64.2, Lymphocytes (%) (Auto) 

22.4, Monocytes (%) (Auto) 12.3H, Eosinophils (%) (Auto) 0.2, Basophils (%) (

Auto) 0.9, Sodium Level 136, Potassium Level 3.7, Chloride Level 100, Carbon 

Dioxide Level 34H, Anion Gap 2L, Blood Urea Nitrogen 18, Creatinine 1.1, 

Estimat Glomerular Filtration Rate > 60, Glucose Level 95, Uric Acid 2.1L, 

Calcium Level 8.2L, Phosphorus Level 3.4, Magnesium Level 2.1, Total Bilirubin 

0.2, Gamma Glutamyl Transpeptidase 122H, Aspartate Amino Transf (AST/SGOT) 43H, 

Alanine Aminotransferase (ALT/SGPT) 75, Alkaline Phosphatase 44L, Total 

Creatine Kinase 195, Total Protein 6.4, Albumin 3.0L, Globulin 3.4, Albumin/

Globulin Ratio 0.9L, Triglycerides Level 99, Cholesterol Level 181, LDL 

Cholesterol 75, HDL Cholesterol 76H, Cholesterol/HDL Ratio 2.4L


Height (Feet):  6


Height (Inches):  2.00


Weight (Pounds):  191


General Appearance:  lethargic


EENT:  normal ENT inspection


Neck:  normal alignment


Cardiovascular:  normal peripheral pulses, normal rate, regular rhythm


Respiratory/Chest:  chest wall non-tender, lungs clear, normal breath sounds


Abdomen:  normal bowel sounds, non tender, soft


Extremities:  normal inspection


Edema:  no edema noted Arm (L), no edema noted Arm (R), no edema noted Leg (L), 

no edema noted Leg (R), no edema noted Pedal (L), no edema noted Pedal (R), no 

edema noted Generalized


Neurologic:  responsive, motor weakness


Skin:  normal pigmentation, warm/dry











Juan M Uriarte DO Nov 17, 2019 08:50

## 2019-11-17 NOTE — PULMONOLOGY PROGRESS NOTE
Assessment/Plan


Assessment/Plan


ASSESSMENT


COPD exacerbation  


Possible pneumonia


Acute kidney injury


Emphysema


Electrolyte imbalance : hyponatremia hypokalemia


Elevated AST





PLAN OF CARE


MS floor


O2 titrate, HHN PRN


IV steroids and taper


empiric abx 


SCX negative, BCX NGTD ,   influenza swab negative


fup with CXR 


antitussive PRN


trial of theophylline


DVT prophylaxis


Echo with pEF


monitor renal parameters , electrolytes,  correct electrolytes as needed , 

avoid nephrotoxic 


Na and K stable this am 


renal US negative  





case discussed and evaluated by supervising physician





Subjective


Allergies:  


Coded Allergies:  


     Tuna (Verified  Allergy, Unknown, 6/18/19)


Subjective


still congested, less wheezing


no CP 


leukocytosis resolved ? reactive





Objective





Last 24 Hour Vital Signs








  Date Time  Temp Pulse Resp B/P (MAP) Pulse Ox O2 Delivery O2 Flow Rate FiO2


 


11/17/19 10:38     95 Nasal Cannula 2.0 


 


11/17/19 10:35  72 18  95 Nasal Cannula 2.0 


 


11/17/19 09:06      Nasal Cannula 2.0 


 


11/17/19 08:00 98.8 88 18 143/78 (99) 95   


 


11/17/19 04:04 97.3 76 18 134/77 (96) 96   


 


11/17/19 00:00 97.7 72 20 113/82 (92) 95   


 


11/16/19 21:00      Nasal Cannula 2.0 


 


11/16/19 20:13     96 Nasal Cannula 2.0 28


 


11/16/19 20:13  77 20  96 Nasal Cannula 2.0 28


 


11/16/19 20:00 97.0 72 18 139/86 (103) 98   


 


11/16/19 18:13 97.7 77 18 134/88 (103) 95   


 


11/16/19 12:00 96.8 71 18 152/93 (112) 93   

















Intake and Output  


 


 11/16/19 11/17/19





 18:59 06:59


 


Intake Total 1000 ml 137.5 ml


 


Balance 1000 ml 137.5 ml


 


  


 


Intake Oral 1000 ml 


 


IV Total  137.5 ml


 


# Voids 4 


 


# Bowel Movements 3 








Objective


General Appearance:  no acute distress,  A/A/O x 4 AA male


HEENT:  normocephalic, atraumatic, anicteric, mucous membranes moist


Respiratory/Chest:  expiratory wheezes  - scattered bilaterally


Cardiovascular:  normal rate, no gallop/murmur, no JVD


Abdomen:  normal bowel sounds, soft, non tender


Extremities:  no edema, pedal pulses normal


Neurologic/Psychiatric:  no motor/sensory deficits, alert, oriented x 3, 

responsive


Musculoskeletal:  normal muscle bulk





Microbiology








 Date/Time


Source Procedure


Growth Status


 


 


 11/14/19 17:05


Nose - Final Complete


 


 11/14/19 17:05


Nose - Final Complete








Laboratory Tests


11/17/19 05:15: 


White Blood Count 9.6, Red Blood Count 5.31, Hemoglobin 14.6, Hematocrit 44.0, 

Mean Corpuscular Volume 83, Mean Corpuscular Hemoglobin 27.6, Mean Corpuscular 

Hemoglobin Concent 33.3, Red Cell Distribution Width 11.3L, Platelet Count 126L

, Mean Platelet Volume 7.8, Neutrophils (%) (Auto) 64.2, Lymphocytes (%) (Auto) 

22.4, Monocytes (%) (Auto) 12.3H, Eosinophils (%) (Auto) 0.2, Basophils (%) (

Auto) 0.9, Sodium Level 136, Potassium Level 3.7, Chloride Level 100, Carbon 

Dioxide Level 34H, Anion Gap 2L, Blood Urea Nitrogen 18, Creatinine 1.1, 

Estimat Glomerular Filtration Rate > 60, Glucose Level 95, Uric Acid 2.1L, 

Calcium Level 8.2L, Phosphorus Level 3.4, Magnesium Level 2.1, Total Bilirubin 

0.2, Gamma Glutamyl Transpeptidase 122H, Aspartate Amino Transf (AST/SGOT) 43H, 

Alanine Aminotransferase (ALT/SGPT) 75, Alkaline Phosphatase 44L, Total 

Creatine Kinase 195, Total Protein 6.4, Albumin 3.0L, Globulin 3.4, Albumin/

Globulin Ratio 0.9L, Triglycerides Level 99, Cholesterol Level 181, LDL 

Cholesterol 75, HDL Cholesterol 76H, Cholesterol/HDL Ratio 2.4L





Current Medications








 Medications


  (Trade)  Dose


 Ordered  Sig/Zana


 Route


 PRN Reason  Start Time


 Stop Time Status Last Admin


Dose Admin


 


 Albuterol/


 Ipratropium


  (Albuterol/


 Ipratropium)  3 ml  Q4H  PRN


 HHN


 dyspnea  11/15/19 17:15


 11/18/19 21:14  11/17/19 10:34


 


 


 Azithromycin 250


 mg/Dextrose  275 ml @ 


 275 mls/hr  Q24HRS


 IV


   11/16/19 11:00


 11/22/19 11:59  11/16/19 13:23


 


 


 Cyclobenzaprine


 HCl


  (Flexeril)  10 mg  HSPRN  PRN


 ORAL


 muscle spasm  11/15/19 21:15


 12/13/19 21:14   


 


 


 Dextrose


  (Dextrose 50%)  25 ml  Q30M  PRN


 IV


 Hypoglycemia  11/15/19 14:15


 12/13/19 21:14   


 


 


 Dextrose


  (Dextrose 50%)  50 ml  Q30M  PRN


 IV


 Hypoglycemia  11/15/19 14:15


 12/13/19 21:14   


 


 


 Docusate Sodium


  (Colace)  100 mg  THREE TIMES A  DAY


 ORAL


   11/15/19 18:00


 12/14/19 17:59   


 


 


 Heparin Sodium


  (Porcine)


  (Heparin 5000


 units/ml)  5,000 units  EVERY 12  HOURS


 SUBQ


   11/15/19 21:00


 12/14/19 08:59   


 


 


 Lorazepam


  (Ativan 2mg/ml


 1ml)  0.5 mg  Q4H  PRN


 IV


 For Anxiety  11/15/19 17:15


 11/20/19 21:14   


 


 


 Methocarbamol


  (Robaxin)  500 mg  TID


 ORAL


   11/15/19 18:00


 12/14/19 08:59  11/17/19 08:41


 


 


 Methylprednisolone


 Sodium Succinate


  (Solu-MEDROL)  50 mg  DAILY


 IV


   11/17/19 09:00


 12/14/19 00:00  11/17/19 08:42


 


 


 Morphine Sulfate


  (Morphine


 Sulfate)  2 mg  Q4H  PRN


 IVP


 severe pain 7-10  11/15/19 17:15


 11/20/19 21:14   


 


 


 Nitroglycerin


  (Ntg)  0.4 mg  Q5M X 3 DOSES PRN


 SL


 Prn Chest Pain  11/15/19 14:00


 12/13/19 21:14   


 


 


 Ondansetron HCl


  (Zofran)  4 mg  Q6H  PRN


 IVP


 Nausea & Vomiting  11/15/19 15:15


 12/13/19 21:14   


 


 


 Oxycodone/


 Acetaminophen


  (Percocet 5-325)  1 tab  Q6H  PRN


 ORAL


 Moderate Pain (Pain Scale 4-6)  11/15/19 15:15


 11/20/19 21:14  11/16/19 08:49


 


 


 Pantoprazole


  (Protonix)  40 mg  EVERY 12  HOURS


 ORAL


   11/15/19 21:00


 12/14/19 20:59  11/17/19 08:42


 


 


 Piperacillin Sod/


 Tazobactam Sod


 3.375 gm/Sodium


 Chloride  110 ml @ 


 27.5 mls/hr  EVERY 8  HOURS


 IVPB


   11/15/19 15:00


 11/19/19 23:59  11/17/19 05:10


 


 


 Promethazine HCl/


 Codeine


  (Phenergan with


 Codeine)  5 ml  Q6H  PRN


 ORAL


 cough  11/15/19 15:15


 12/13/19 21:14  11/17/19 06:08


 


 


 Quetiapine


 Fumarate


  (SEROqueL)  100 mg  QHS


 ORAL


   11/15/19 21:00


 12/13/19 21:44  11/16/19 21:15


 


 


 Tamsulosin HCl


  (Flomax)  0.4 mg  BEDTIME


 ORAL


   11/15/19 21:00


 12/14/19 20:59  11/16/19 21:15


 


 


 Temazepam


  (Restoril)  15 mg  HSPRN  PRN


 ORAL


 Insomnia  11/15/19 21:15


 11/20/19 21:14   


 


 


 Theophylline


  (Julien-Dur)  100 mg  EVERY 12  HOURS


 ORAL


   11/15/19 21:00


 12/14/19 08:59  11/17/19 08:42


 


 


 Trazodone HCl


  (Desyrel)  100 mg  BEDTIME


 ORAL


   11/15/19 21:00


 12/13/19 21:44  11/16/19 21:15


 

















Yoana Marroquin NP Nov 17, 2019 11:08

## 2019-11-17 NOTE — NUR
NURSE NOTES:

Received patient awake in bed, in good spirits, no c/o pain at this time, no s/s of acute 
distress. Bed low and locked, non slip socks on. IV access patent, dressing dry and intact. 
Bed alarm on, patient ambulatory and on BRP.

## 2019-11-18 VITALS — SYSTOLIC BLOOD PRESSURE: 129 MMHG | DIASTOLIC BLOOD PRESSURE: 83 MMHG

## 2019-11-18 VITALS — DIASTOLIC BLOOD PRESSURE: 74 MMHG | SYSTOLIC BLOOD PRESSURE: 148 MMHG

## 2019-11-18 VITALS — SYSTOLIC BLOOD PRESSURE: 135 MMHG | DIASTOLIC BLOOD PRESSURE: 82 MMHG

## 2019-11-18 VITALS — DIASTOLIC BLOOD PRESSURE: 81 MMHG | SYSTOLIC BLOOD PRESSURE: 131 MMHG

## 2019-11-18 LAB
ADD MANUAL DIFF: NO
ANION GAP SERPL CALC-SCNC: 7 MMOL/L (ref 5–15)
BASOPHILS NFR BLD AUTO: 1.7 % (ref 0–2)
BUN SERPL-MCNC: 15 MG/DL (ref 7–18)
CALCIUM SERPL-MCNC: 8.2 MG/DL (ref 8.5–10.1)
CHLORIDE SERPL-SCNC: 103 MMOL/L (ref 98–107)
CO2 SERPL-SCNC: 31 MMOL/L (ref 21–32)
CREAT SERPL-MCNC: 1 MG/DL (ref 0.55–1.3)
EOSINOPHIL NFR BLD AUTO: 0.4 % (ref 0–3)
ERYTHROCYTE [DISTWIDTH] IN BLOOD BY AUTOMATED COUNT: 10.1 % (ref 11.6–14.8)
HCT VFR BLD CALC: 42.6 % (ref 42–52)
HGB BLD-MCNC: 14.6 G/DL (ref 14.2–18)
LYMPHOCYTES NFR BLD AUTO: 24.7 % (ref 20–45)
MCV RBC AUTO: 81 FL (ref 80–99)
MONOCYTES NFR BLD AUTO: 12.3 % (ref 1–10)
NEUTROPHILS NFR BLD AUTO: 61 % (ref 45–75)
PLATELET # BLD: 134 K/UL (ref 150–450)
POTASSIUM SERPL-SCNC: 3.3 MMOL/L (ref 3.5–5.1)
RBC # BLD AUTO: 5.27 M/UL (ref 4.7–6.1)
SODIUM SERPL-SCNC: 141 MMOL/L (ref 136–145)
WBC # BLD AUTO: 9.4 K/UL (ref 4.8–10.8)

## 2019-11-18 RX ADMIN — THEOPHYLLINE ANHYDROUS SCH MG: 100 CAPSULE, EXTENDED RELEASE ORAL at 08:48

## 2019-11-18 RX ADMIN — OXYCODONE HYDROCHLORIDE AND ACETAMINOPHEN PRN TAB: 5; 325 TABLET ORAL at 08:48

## 2019-11-18 RX ADMIN — AZITHROMYCIN DIHYDRATE SCH MLS/HR: 500 INJECTION, POWDER, LYOPHILIZED, FOR SOLUTION INTRAVENOUS at 12:10

## 2019-11-18 RX ADMIN — DEXTROSE MONOHYDRATE SCH MLS/HR: 50 INJECTION, SOLUTION INTRAVENOUS at 05:42

## 2019-11-18 RX ADMIN — METHOCARBAMOL SCH MG: 500 TABLET, FILM COATED ORAL at 12:42

## 2019-11-18 RX ADMIN — HEPARIN SODIUM SCH UNITS: 5000 INJECTION INTRAVENOUS; SUBCUTANEOUS at 09:00

## 2019-11-18 RX ADMIN — METHOCARBAMOL SCH MG: 500 TABLET, FILM COATED ORAL at 08:47

## 2019-11-18 RX ADMIN — DOCUSATE SODIUM SCH MG: 100 CAPSULE, LIQUID FILLED ORAL at 08:48

## 2019-11-18 RX ADMIN — DOCUSATE SODIUM SCH MG: 100 CAPSULE, LIQUID FILLED ORAL at 12:11

## 2019-11-18 NOTE — NUR
NOTES





INQUIRY FAXED TO [facility]. OK TO [facility].  WILL CALL PT AND SCHEDULE AN APPOINTMENT.

## 2019-11-18 NOTE — PULMONOLOGY PROGRESS NOTE
Assessment/Plan


Problems:  


(1) Acute exacerbation of chronic obstructive pulmonary disease (COPD)


(2) ATN (acute tubular necrosis)


(3) Emphysema lung


Assessment/Plan


afebrile


getting better


check sputum


no new complains


dc home today


med/recopn reviewed


dvt prophylaxis.





Subjective


ROS Limited/Unobtainable:  No


Constitutional:  Reports: no symptoms


HEENT:  Repors: no symptoms


Respiratory:  Reports: no symptoms


Allergies:  


Coded Allergies:  


     Tuna (Verified  Allergy, Unknown, 6/18/19)





Objective





Last 24 Hour Vital Signs








  Date Time  Temp Pulse Resp B/P (MAP) Pulse Ox O2 Delivery O2 Flow Rate FiO2


 


11/18/19 09:18 97.0       


 


11/18/19 09:07     96 Nasal Cannula 2.0 28


 


11/18/19 09:06  77 20  96 Nasal Cannula 2.0 28


 


11/18/19 09:00      Nasal Cannula 2.0 


 


11/18/19 08:00 97.0 89 20 148/74 (98) 96   


 


11/18/19 04:00 97.6 77 17 131/81 (98) 98   


 


11/18/19 03:31  80 20  97 Nasal Cannula 2.0 28


 


11/18/19 03:21  83 22  95 Nasal Cannula 2.0 28


 


11/18/19 00:00 97.7 75 18 135/82 (99) 98   


 


11/17/19 23:43      Nasal Cannula 2.0 


 


11/17/19 20:00 97.3 89 19 113/80 (91) 98   


 


11/17/19 19:48  74 20  96 Nasal Cannula 2.0 28


 


11/17/19 19:48     96 Nasal Cannula 2.0 28


 


11/17/19 16:00 97.9  19 129/83 (98) 98   

















Intake and Output  


 


 11/17/19 11/18/19





 19:00 07:00


 


Intake Total 600 ml 250 ml


 


Balance 600 ml 250 ml


 


  


 


Intake Oral 600 ml 250 ml


 


# Voids 9 2


 


# Bowel Movements 1 








General Appearance:  WD/WN


HEENT:  normocephalic, atraumatic


Respiratory/Chest:  chest wall non-tender, normal breath sounds


Cardiovascular:  normal peripheral pulses, normal rate


Abdomen:  normal bowel sounds, soft, non tender


Genitourinary:  normal external genitalia


Skin:  no rash, no lesions


Laboratory Tests


11/18/19 05:50: 


White Blood Count 9.4, Red Blood Count 5.27, Hemoglobin 14.6, Hematocrit 42.6, 

Mean Corpuscular Volume 81, Mean Corpuscular Hemoglobin 27.7, Mean Corpuscular 

Hemoglobin Concent 34.2, Red Cell Distribution Width 10.1L, Platelet Count 134L

, Mean Platelet Volume 7.4, Neutrophils (%) (Auto) 61.0, Lymphocytes (%) (Auto) 

24.7, Monocytes (%) (Auto) 12.3H, Eosinophils (%) (Auto) 0.4, Basophils (%) (

Auto) 1.7, Sodium Level 141, Potassium Level 3.3L, Chloride Level 103, Carbon 

Dioxide Level 31, Anion Gap 7, Blood Urea Nitrogen 15, Creatinine 1.0, Estimat 

Glomerular Filtration Rate > 60, Glucose Level 90, Calcium Level 8.2L





Current Medications








 Medications


  (Trade)  Dose


 Ordered  Sig/Zana


 Route


 PRN Reason  Start Time


 Stop Time Status Last Admin


Dose Admin


 


 Albuterol/


 Ipratropium


  (Albuterol/


 Ipratropium)  3 ml  Q4H  PRN


 HHN


 dyspnea  11/17/19 15:15


 11/20/19 15:14  11/18/19 03:21


 


 


 Azithromycin 250


 mg/Dextrose  275 ml @ 


 275 mls/hr  Q24HRS


 IV


   11/16/19 11:00


 11/22/19 11:59  11/18/19 12:10


 


 


 Cyclobenzaprine


 HCl


  (Flexeril)  10 mg  HSPRN  PRN


 ORAL


 muscle spasm  11/15/19 21:15


 12/13/19 21:14   


 


 


 Dextrose


  (Dextrose 50%)  25 ml  Q30M  PRN


 IV


 Hypoglycemia  11/15/19 14:15


 12/13/19 21:14   


 


 


 Dextrose


  (Dextrose 50%)  50 ml  Q30M  PRN


 IV


 Hypoglycemia  11/15/19 14:15


 12/13/19 21:14   


 


 


 Docusate Sodium


  (Colace)  100 mg  THREE TIMES A  DAY


 ORAL


   11/15/19 18:00


 12/14/19 17:59  11/18/19 12:11


 


 


 Heparin Sodium


  (Porcine)


  (Heparin 5000


 units/ml)  5,000 units  EVERY 12  HOURS


 SUBQ


   11/15/19 21:00


 12/14/19 08:59   


 


 


 Lorazepam


  (Ativan 2mg/ml


 1ml)  0.5 mg  Q4H  PRN


 IV


 For Anxiety  11/15/19 17:15


 11/20/19 21:14   


 


 


 Methocarbamol


  (Robaxin)  500 mg  TID


 ORAL


   11/15/19 18:00


 12/14/19 08:59  11/18/19 08:47


 


 


 Methylprednisolone


 Sodium Succinate


  (Solu-MEDROL)  40 mg  DAILY


 IVP


   11/18/19 09:00


 12/14/19 00:00  11/18/19 08:47


 


 


 Morphine Sulfate


  (Morphine


 Sulfate)  2 mg  Q4H  PRN


 IVP


 severe pain 7-10  11/15/19 17:15


 11/20/19 21:14   


 


 


 Nitroglycerin


  (Ntg)  0.4 mg  Q5M X 3 DOSES PRN


 SL


 Prn Chest Pain  11/15/19 14:00


 12/13/19 21:14   


 


 


 Ondansetron HCl


  (Zofran)  4 mg  Q6H  PRN


 IVP


 Nausea & Vomiting  11/15/19 15:15


 12/13/19 21:14   


 


 


 Oxycodone/


 Acetaminophen


  (Percocet 5-325)  1 tab  Q6H  PRN


 ORAL


 Moderate Pain (Pain Scale 4-6)  11/15/19 15:15


 11/20/19 21:14  11/18/19 08:48


 


 


 Pantoprazole


  (Protonix)  40 mg  EVERY 12  HOURS


 ORAL


   11/15/19 21:00


 12/14/19 20:59  11/18/19 08:48


 


 


 Piperacillin Sod/


 Tazobactam Sod


 3.375 gm/Sodium


 Chloride  110 ml @ 


 27.5 mls/hr  EVERY 8  HOURS


 IVPB


   11/15/19 15:00


 11/19/19 23:59  11/18/19 05:42


 


 


 Potassium Chloride


  (K-Dur)  40 meq  DAILY


 ORAL


   11/18/19 09:00


 12/18/19 08:59  11/18/19 08:48


 


 


 Promethazine HCl/


 Codeine


  (Phenergan with


 Codeine)  5 ml  Q6H  PRN


 ORAL


 cough  11/15/19 15:15


 12/13/19 21:14  11/17/19 06:08


 


 


 Quetiapine


 Fumarate


  (SEROqueL)  100 mg  QHS


 ORAL


   11/15/19 21:00


 12/13/19 21:44  11/17/19 20:42


 


 


 Tamsulosin HCl


  (Flomax)  0.4 mg  BEDTIME


 ORAL


   11/15/19 21:00


 12/14/19 20:59  11/17/19 20:41


 


 


 Temazepam


  (Restoril)  15 mg  HSPRN  PRN


 ORAL


 Insomnia  11/15/19 21:15


 11/20/19 21:14   


 


 


 Theophylline


  (Julien-Dur)  100 mg  EVERY 12  HOURS


 ORAL


   11/15/19 21:00


 12/14/19 08:59  11/18/19 08:48


 


 


 Trazodone HCl


  (Desyrel)  100 mg  BEDTIME


 ORAL


   11/15/19 21:00


 12/13/19 21:44  11/17/19 20:41


 

















Toan Norman MD Nov 18, 2019 12:36

## 2019-11-18 NOTE — NUR
NURSE NOTES:

Pt has discharge order, all D/C assessments and instructions done and pt verbally confirmed 
to understand all. pt is stable,m V/S stable, pt is aware about ABX in his pharmacy to get 
and use for 2 days, Dr PAT visited pt and no new order for D/C AND no prescription, per 
Dr CALLAHAN order, PNA vaccine given to pt. pt know about [facility] with [id number]. all 
belongings are with pt and list signed by pt. per pt no family involved that RN call.  iv 
access D/C, hospital provided taxi for pt, waiting to  him. will continue to monitor.

## 2019-11-18 NOTE — NEPHROLOGY PROGRESS NOTE
Assessment/Plan


Problem List:  


(1) ATN (acute tubular necrosis)


(2) COPD exacerbation


(3) Drug abuse


(4) Rhabdomyolysis


Assessment:  mild 





Assessment





ATN (acute tubular necrosis)- was on NSAIDs


Acute exacerbation of chronic obstructive pulmonary disease (COPD)


Emphysema lung


h/o HCV


Plan





Urine + Opiates, Benzo, MJ


K supplement


Taper steroids per pulmonary





Hold Motrin


Optimize pulmonary status


Urine studies


Urine tox screen


monitor renal parameters


2D echo


per orders





Subjective


ROS Limited/Unobtainable:  No


Constitutional:  Reports: malaise





Objective


Objective





Last 24 Hour Vital Signs








  Date Time  Temp Pulse Resp B/P (MAP) Pulse Ox O2 Delivery O2 Flow Rate FiO2


 


11/18/19 09:18 97.0       


 


11/18/19 09:07     96 Nasal Cannula 2.0 28


 


11/18/19 09:06  77 20  96 Nasal Cannula 2.0 28


 


11/18/19 09:00      Nasal Cannula 2.0 


 


11/18/19 08:00 97.0 89 20 148/74 (98) 96   


 


11/18/19 04:00 97.6 77 17 131/81 (98) 98   


 


11/18/19 03:31  80 20  97 Nasal Cannula 2.0 28


 


11/18/19 03:21  83 22  95 Nasal Cannula 2.0 28


 


11/18/19 00:00 97.7 75 18 135/82 (99) 98   


 


11/17/19 23:43      Nasal Cannula 2.0 


 


11/17/19 20:00 97.3 89 19 113/80 (91) 98   


 


11/17/19 19:48  74 20  96 Nasal Cannula 2.0 28


 


11/17/19 19:48     96 Nasal Cannula 2.0 28


 


11/17/19 16:00 97.9  19 129/83 (98) 98   

















Intake and Output  


 


 11/17/19 11/18/19





 19:00 07:00


 


Intake Total 600 ml 250 ml


 


Balance 600 ml 250 ml


 


  


 


Intake Oral 600 ml 250 ml


 


# Voids 9 2


 


# Bowel Movements 1 








Laboratory Tests


11/18/19 05:50: 


White Blood Count 9.4, Red Blood Count 5.27, Hemoglobin 14.6, Hematocrit 42.6, 

Mean Corpuscular Volume 81, Mean Corpuscular Hemoglobin 27.7, Mean Corpuscular 

Hemoglobin Concent 34.2, Red Cell Distribution Width 10.1L, Platelet Count 134L

, Mean Platelet Volume 7.4, Neutrophils (%) (Auto) 61.0, Lymphocytes (%) (Auto) 

24.7, Monocytes (%) (Auto) 12.3H, Eosinophils (%) (Auto) 0.4, Basophils (%) (

Auto) 1.7, Sodium Level 141, Potassium Level 3.3L, Chloride Level 103, Carbon 

Dioxide Level 31, Anion Gap 7, Blood Urea Nitrogen 15, Creatinine 1.0, Estimat 

Glomerular Filtration Rate > 60, Glucose Level 90, Calcium Level 8.2L


Height (Feet):  6


Height (Inches):  2.00


Weight (Pounds):  191


General Appearance:  no apparent distress


Respiratory/Chest:  decreased breath sounds


Abdomen:  soft


Objective


no change











Shade Gray MD Nov 18, 2019 12:10

## 2019-11-18 NOTE — NUR
NURSE NOTES:

Dr CALLAHAN visited pt and he is notified about  and HEP didn't administer, no new order 
to RN. will continue to monitor.

## 2019-11-18 NOTE — GENERAL PROGRESS NOTE
Assessment/Plan


Problem List:  


(1) Insomnia


ICD Codes:  G47.00 - Insomnia, unspecified


SNOMED:  775184001


(2) ATN (acute tubular necrosis)


ICD Codes:  N17.0 - Acute kidney failure with tubular necrosis


SNOMED:  02096689


(3) Acute exacerbation of chronic obstructive pulmonary disease (COPD)


ICD Codes:  J44.1 - Chronic obstructive pulmonary disease with (acute) 

exacerbation


SNOMED:  712726947


(4) Pneumonia


ICD Codes:  J18.9 - Pneumonia, unspecified organism


SNOMED:  856481143


(5) Emphysema lung


ICD Codes:  J43.9 - Emphysema, unspecified


SNOMED:  81273292


Status:  stable, progressing


Assessment/Plan:


o2 pulm tx abx pt diet dc if clear





Subjective


Constitutional:  Reports: weakness


Allergies:  


Coded Allergies:  


     Tuna (Verified  Allergy, Unknown, 6/18/19)


All Systems:  reviewed and negative except above


Subjective


o2nc calm





Objective





Last 24 Hour Vital Signs








  Date Time  Temp Pulse Resp B/P (MAP) Pulse Ox O2 Delivery O2 Flow Rate FiO2


 


11/18/19 09:07     96 Nasal Cannula 2.0 28


 


11/18/19 09:06  77 20  96 Nasal Cannula 2.0 28


 


11/18/19 08:00 97.0 89 20 148/74 (98) 96   


 


11/18/19 04:00 97.6 77 17 131/81 (98) 98   


 


11/18/19 03:31  80 20  97 Nasal Cannula 2.0 28


 


11/18/19 03:21  83 22  95 Nasal Cannula 2.0 28


 


11/18/19 00:00 97.7 75 18 135/82 (99) 98   


 


11/17/19 23:43      Nasal Cannula 2.0 


 


11/17/19 20:00 97.3 89 19 113/80 (91) 98   


 


11/17/19 19:48  74 20  96 Nasal Cannula 2.0 28


 


11/17/19 19:48     96 Nasal Cannula 2.0 28


 


11/17/19 16:00 97.9  19 129/83 (98) 98   


 


11/17/19 12:00 97.0  20 131/77 (95) 94   


 


11/17/19 10:38     95 Nasal Cannula 2.0 


 


11/17/19 10:35  72 18  95 Nasal Cannula 2.0 





  72 18     


 


11/17/19 10:35  72 18  95 Nasal Cannula 2.0 

















Intake and Output  


 


 11/17/19 11/18/19





 19:00 07:00


 


Intake Total 600 ml 250 ml


 


Balance 600 ml 250 ml


 


  


 


Intake Oral 600 ml 250 ml


 


# Voids 9 2


 


# Bowel Movements 1 








Laboratory Tests


11/18/19 05:50: 


White Blood Count 9.4, Red Blood Count 5.27, Hemoglobin 14.6, Hematocrit 42.6, 

Mean Corpuscular Volume 81, Mean Corpuscular Hemoglobin 27.7, Mean Corpuscular 

Hemoglobin Concent 34.2, Red Cell Distribution Width 10.1L, Platelet Count 134L

, Mean Platelet Volume 7.4, Neutrophils (%) (Auto) 61.0, Lymphocytes (%) (Auto) 

24.7, Monocytes (%) (Auto) 12.3H, Eosinophils (%) (Auto) 0.4, Basophils (%) (

Auto) 1.7, Sodium Level 141, Potassium Level 3.3L, Chloride Level 103, Carbon 

Dioxide Level 31, Anion Gap 7, Blood Urea Nitrogen 15, Creatinine 1.0, Estimat 

Glomerular Filtration Rate > 60, Glucose Level 90, Calcium Level 8.2L


Height (Feet):  6


Height (Inches):  2.00


Weight (Pounds):  191


General Appearance:  alert


EENT:  normal ENT inspection


Neck:  non-tender, normal alignment, supple


Cardiovascular:  normal peripheral pulses, normal rate, regular rhythm


Respiratory/Chest:  chest wall non-tender, lungs clear, normal breath sounds


Abdomen:  normal bowel sounds, non tender, soft


Extremities:  normal inspection


Edema:  no edema noted Arm (L), no edema noted Arm (R), no edema noted Leg (L), 

no edema noted Leg (R), no edema noted Pedal (L), no edema noted Pedal (R), no 

edema noted Generalized


Neurologic:  responsive, motor weakness


Skin:  normal pigmentation, warm/dry











Juan M Uriarte DO Nov 18, 2019 09:27

## 2019-11-18 NOTE — INFECTIOUS DISEASES PROG NOTE
Assessment/Plan


Assessment/Plan


63yo gentleman with PMH below presents to ED with productive cough with thick 

green sputum. Last week, started having sniffles and worsened cough. Got Z 

pack. Took Z pack. No improvement so he came to ED. Reports greenish sputum, SOB

, LEMOS, sniffly, congestion. Watery diarrhea since starting Z ankit. No rash. No 

body aches. Denies fever or chills at home. No dysuria, hematuria. 


Daughter also had a cold but his symptoms started before he met up with his 

daughter. 





Tmax 100.8, SP


Leukocytosis on steroids, SP


COPD exacerbation vs CAP


   flu swab negative


   CXR: The lungs are hyperexpanded. Reticular densities again noted within 

perihilar and basilar aspects of the lungs. Heart size is stable. Bones are 

osteopenic.


   11/14 sputum cx: normal nba





r/o bacteremia


   11/13 BCx: ngtd





Unlikely UTI


   UA negative








Plan:


continue Zosyn #6 and azithromycin #4


DC on augmentin 875mg BID and doxycycline 100mg BID for 2 days


   SP Ceftriaxone 11/13





f/u bcx


f/u sputum cx


pulmonary toilet, breathing treatment, methylprednisolone per pulm


discussed with RN





Thank you for this consult. [facility] will continue to follow the patient with 

you.





Subjective


Allergies:  


Coded Allergies:  


     Tuna (Verified  Allergy, Unknown, 6/18/19)


Subjective


Afebrile. 2L.


Feels much better





Objective


Vital Signs





Last 24 Hour Vital Signs








  Date Time  Temp Pulse Resp B/P (MAP) Pulse Ox O2 Delivery O2 Flow Rate FiO2


 


11/18/19 09:07     96 Nasal Cannula 2.0 28


 


11/18/19 09:06  77 20  96 Nasal Cannula 2.0 28


 


11/18/19 08:00 97.0 89 20 148/74 (98) 96   


 


11/18/19 04:00 97.6 77 17 131/81 (98) 98   


 


11/18/19 03:31  80 20  97 Nasal Cannula 2.0 28


 


11/18/19 03:21  83 22  95 Nasal Cannula 2.0 28


 


11/18/19 00:00 97.7 75 18 135/82 (99) 98   


 


11/17/19 23:43      Nasal Cannula 2.0 


 


11/17/19 20:00 97.3 89 19 113/80 (91) 98   


 


11/17/19 19:48  74 20  96 Nasal Cannula 2.0 28


 


11/17/19 19:48     96 Nasal Cannula 2.0 28


 


11/17/19 16:00 97.9  19 129/83 (98) 98   


 


11/17/19 12:00 97.0  20 131/77 (95) 94   


 


11/17/19 10:38     95 Nasal Cannula 2.0 


 


11/17/19 10:35  72 18  95 Nasal Cannula 2.0 





  72 18     


 


11/17/19 10:35  72 18  95 Nasal Cannula 2.0 








Height (Feet):  6


Height (Inches):  2.00


Weight (Pounds):  191


Objective





Gen: NAD


HEENT: anicteric sclera


CV: RRR. no rubs or gallop


Resp: prolonged expiratory phase. expiratory wheezes. Rhonchi.


Abd: distended. soft. no TTP


Ext: No LE edema


Neuro: alert. appropriate


Skin: warm. dry.





Laboratory Tests








Test


  11/18/19


05:50


 


White Blood Count


  9.4 K/UL


(4.8-10.8)


 


Red Blood Count


  5.27 M/UL


(4.70-6.10)


 


Hemoglobin


  14.6 G/DL


(14.2-18.0)


 


Hematocrit


  42.6 %


(42.0-52.0)


 


Mean Corpuscular Volume 81 FL (80-99)  


 


Mean Corpuscular Hemoglobin


  27.7 PG


(27.0-31.0)


 


Mean Corpuscular Hemoglobin


Concent 34.2 G/DL


(32.0-36.0)


 


Red Cell Distribution Width


  10.1 %


(11.6-14.8)  L


 


Platelet Count


  134 K/UL


(150-450)  L


 


Mean Platelet Volume


  7.4 FL


(6.5-10.1)


 


Neutrophils (%) (Auto)


  61.0 %


(45.0-75.0)


 


Lymphocytes (%) (Auto)


  24.7 %


(20.0-45.0)


 


Monocytes (%) (Auto)


  12.3 %


(1.0-10.0)  H


 


Eosinophils (%) (Auto)


  0.4 %


(0.0-3.0)


 


Basophils (%) (Auto)


  1.7 %


(0.0-2.0)


 


Sodium Level


  141 MMOL/L


(136-145)


 


Potassium Level


  3.3 MMOL/L


(3.5-5.1)  L


 


Chloride Level


  103 MMOL/L


()


 


Carbon Dioxide Level


  31 MMOL/L


(21-32)


 


Anion Gap


  7 mmol/L


(5-15)


 


Blood Urea Nitrogen


  15 mg/dL


(7-18)


 


Creatinine


  1.0 MG/DL


(0.55-1.30)


 


Estimat Glomerular Filtration


Rate > 60 mL/min


(>60)


 


Glucose Level


  90 MG/DL


()


 


Calcium Level


  8.2 MG/DL


(8.5-10.1)  L











Current Medications








 Medications


  (Trade)  Dose


 Ordered  Sig/Zana


 Route


 PRN Reason  Start Time


 Stop Time Status Last Admin


Dose Admin


 


 Albuterol/


 Ipratropium


  (Albuterol/


 Ipratropium)  3 ml  Q4H  PRN


 HHN


 dyspnea  11/17/19 15:15


 11/20/19 15:14  11/18/19 03:21


 


 


 Azithromycin 250


 mg/Dextrose  275 ml @ 


 275 mls/hr  Q24HRS


 IV


   11/16/19 11:00


 11/22/19 11:59  11/17/19 11:42


 


 


 Cyclobenzaprine


 HCl


  (Flexeril)  10 mg  HSPRN  PRN


 ORAL


 muscle spasm  11/15/19 21:15


 12/13/19 21:14   


 


 


 Dextrose


  (Dextrose 50%)  25 ml  Q30M  PRN


 IV


 Hypoglycemia  11/15/19 14:15


 12/13/19 21:14   


 


 


 Dextrose


  (Dextrose 50%)  50 ml  Q30M  PRN


 IV


 Hypoglycemia  11/15/19 14:15


 12/13/19 21:14   


 


 


 Docusate Sodium


  (Colace)  100 mg  THREE TIMES A  DAY


 ORAL


   11/15/19 18:00


 12/14/19 17:59  11/18/19 08:48


 


 


 Heparin Sodium


  (Porcine)


  (Heparin 5000


 units/ml)  5,000 units  EVERY 12  HOURS


 SUBQ


   11/15/19 21:00


 12/14/19 08:59   


 


 


 Lorazepam


  (Ativan 2mg/ml


 1ml)  0.5 mg  Q4H  PRN


 IV


 For Anxiety  11/15/19 17:15


 11/20/19 21:14   


 


 


 Methocarbamol


  (Robaxin)  500 mg  TID


 ORAL


   11/15/19 18:00


 12/14/19 08:59  11/18/19 08:47


 


 


 Methylprednisolone


 Sodium Succinate


  (Solu-MEDROL)  40 mg  DAILY


 IVP


   11/18/19 09:00


 12/14/19 00:00  11/18/19 08:47


 


 


 Morphine Sulfate


  (Morphine


 Sulfate)  2 mg  Q4H  PRN


 IVP


 severe pain 7-10  11/15/19 17:15


 11/20/19 21:14   


 


 


 Nitroglycerin


  (Ntg)  0.4 mg  Q5M X 3 DOSES PRN


 SL


 Prn Chest Pain  11/15/19 14:00


 12/13/19 21:14   


 


 


 Ondansetron HCl


  (Zofran)  4 mg  Q6H  PRN


 IVP


 Nausea & Vomiting  11/15/19 15:15


 12/13/19 21:14   


 


 


 Oxycodone/


 Acetaminophen


  (Percocet 5-325)  1 tab  Q6H  PRN


 ORAL


 Moderate Pain (Pain Scale 4-6)  11/15/19 15:15


 11/20/19 21:14  11/18/19 08:48


 


 


 Pantoprazole


  (Protonix)  40 mg  EVERY 12  HOURS


 ORAL


   11/15/19 21:00


 12/14/19 20:59  11/18/19 08:48


 


 


 Piperacillin Sod/


 Tazobactam Sod


 3.375 gm/Sodium


 Chloride  110 ml @ 


 27.5 mls/hr  EVERY 8  HOURS


 IVPB


   11/15/19 15:00


 11/19/19 23:59  11/18/19 05:42


 


 


 Potassium Chloride


  (K-Dur)  40 meq  DAILY


 ORAL


   11/18/19 09:00


 12/18/19 08:59  11/18/19 08:48


 


 


 Promethazine HCl/


 Codeine


  (Phenergan with


 Codeine)  5 ml  Q6H  PRN


 ORAL


 cough  11/15/19 15:15


 12/13/19 21:14  11/17/19 06:08


 


 


 Quetiapine


 Fumarate


  (SEROqueL)  100 mg  QHS


 ORAL


   11/15/19 21:00


 12/13/19 21:44  11/17/19 20:42


 


 


 Tamsulosin HCl


  (Flomax)  0.4 mg  BEDTIME


 ORAL


   11/15/19 21:00


 12/14/19 20:59  11/17/19 20:41


 


 


 Temazepam


  (Restoril)  15 mg  HSPRN  PRN


 ORAL


 Insomnia  11/15/19 21:15


 11/20/19 21:14   


 


 


 Theophylline


  (Julien-Dur)  100 mg  EVERY 12  HOURS


 ORAL


   11/15/19 21:00


 12/14/19 08:59  11/18/19 08:48


 


 


 Trazodone HCl


  (Desyrel)  100 mg  BEDTIME


 ORAL


   11/15/19 21:00


 12/13/19 21:44  11/17/19 20:41


 

















Rossi Gonzalez MD Nov 18, 2019 10:07

## 2019-11-18 NOTE — NUR
NURSE NOTES:

Dr PAT visited pt and he is aware about SPO2 92 with RA, he will give discharge 
prescription. will continue to monitor.

## 2019-11-19 NOTE — DISCHARGE SUMMARY
Discharge Summary


Discharge Summary


_


DATE OF ADMISSION:  11/13/2019





DATE OF DISCHARGE: 11/18/2019








DISCHARGED BY: 





REASON FOR ADMISSION: 


[] 64 years old male past medical history of COPD, emphysema, presented to 

emergency department complaining of increased shortness of breath, productive 

cough for the last 3 days.  Patient was diagnosed in the emergency department 

patient vital signs reveal vital signs are stable.  Pulse oximetry was 92% on 

room air.  Laboratory work-up revealed no leukocytosis stable hemoglobin 

hematocrit.  Sodium 133 potassium 3.3.  BUN 21, creatinine 1.5.  AST 68 ALT 45.

  Albumin 4.0.  Urinalysis revealed remove urine toxicology screen was positive 

for benzodiazepine marijuana and opiates.


Chest x-ray revealed COPD/emphysema.  In emergency department patient received 

nebulizing treatment IV steroids and admitted for further management


 


CONSULTANTS:


cardiologist Dr. Lo


pulmonary Dr. Norman


nephrologist Dr. Gray


 


 


 


[facility] [place] COU[facility]SE: 


[] Patient admitted to medical surgical floor.  Supplemental oxygen provided 

and titrated to keep pulse oximetry above 92%.  Pulmonary toilet with 

bronchodilator provided as needed and around provided.  Patient started on the 

IV steroids with gradual tapering down.  Patient started on empiric antibiotic.

  Sputum culture was negative.  Blood culture was negative.  Influenza swab was 

negative.  Antitussive provided as needed.  Trial of theophylline instituted.  

DVT prophylaxis provided.  Follow-up chest x-ray echocardiogram revealed 

preserved ejection fraction of 55% no evidence of wall motion abnormality.  No 

evidence of left ventricular hypertrophy.  Right ventricular systolic pressure 

of 36 consistent with a mild pulmonary hypertension.  Cardiologist followed.  

Dyspnea was most likely due to his acute COPD exacerbation.  Troponin was 

negative.  EKG revealed no acute ischemic changes.  No evidence of CHF or acute 

coronary syndrome.


Pneumococcal vaccine given prior to discharge.  GI prophylaxis provided.  

Nephrologist closely follow.  Potassium and electrolytes were closely monitor 

renal parameters electrolytes are closely monitor.  Electrolytes corrected as 

needed and nephrotoxins were avoided.  Prior to discharge sodium removed renal 

ultrasound revealed was negative.  Renal parameters electrolytes.  Prior to 

discharge BUN from 21 down to 15 and creatinine from 1.5 down to 1.0 acute 

kidney injury resolved.  Necrosis





CK trended down from initial 105 1-1 95 upon discharge acute kidney injury 

resolved.  Acute kidney injury was most possibly precipitated by 

rhabdomyolysis.  Patient was counseled on abstinence from the drugs.  AST 

minimally elevated possibly due to history of HCV.  Lipid panel was stable.  

Patient clinically stabilized and was ready for discharge home with home health 

services.





FINAL DIAGNOSES: 


COPD exacerbation


Possible pneumonia


Emphysema


Acute kidney injury/ATN


Electrolyte imbalance:  hyponatremia, hypokalemia


Elevated AST with history of HCV


Rhabdomyolysis


Drug abuse





DISCHARGE MEDICATIONS:


See Medication Reconciliation list.





DISCHARGE INSTRUCTIONS:


Patient was discharged home with home health services.  


Follow up with primary care provider in one week.











Yoana Marroquin NP Nov 19, 2019 10:37

## 2023-05-12 ENCOUNTER — HOSPITAL ENCOUNTER (OUTPATIENT)
Dept: HOSPITAL 87 - LAB | Age: 68
Discharge: HOME | End: 2023-05-12
Payer: MEDICARE

## 2023-05-12 DIAGNOSIS — J44.9: ICD-10-CM

## 2023-05-12 DIAGNOSIS — I27.20: ICD-10-CM

## 2023-05-12 DIAGNOSIS — N18.30: Primary | ICD-10-CM

## 2023-05-12 LAB
APPEARANCE UR: CLEAR
BASOPHILS NFR BLD AUTO: 0.4 % (ref 0–2)
CHLORIDE SERPL-SCNC: 102 MEQ/L (ref 98–107)
COLOR UR: YELLOW
CRP SERPL-MCNC: 0.7 MG/L (ref 0–3)
EOSINOPHIL NFR BLD AUTO: 0.4 % (ref 0–5)
ERYTHROCYTE [DISTWIDTH] IN BLOOD BY AUTOMATED COUNT: 13.8 % (ref 11.6–14.6)
HCT VFR BLD AUTO: 44.7 % (ref 42–52)
HDLC SERPL-MCNC: 139 MG/DL (ref 40–59)
HGB BLD-MCNC: 15 G/DL (ref 14–18)
HGB UR QL STRIP: NEGATIVE
KETONES UR STRIP-MCNC: NEGATIVE MG/DL
LDLC SERPL DIRECT ASSAY-MCNC: 43 MG/DL (ref 5–100)
LEUKOCYTE ESTERASE UR QL STRIP: NEGATIVE
LYMPHOCYTES NFR BLD AUTO: 19.7 % (ref 20–50)
MCH RBC QN AUTO: 28 PG (ref 28–32)
MCV RBC AUTO: 83.6 FL (ref 80–94)
MONOCYTES NFR BLD AUTO: 9.1 % (ref 2–8)
NEUTROPHILS NFR BLD AUTO: 70.4 % (ref 40–76)
NITRITE UR QL STRIP: NEGATIVE
PH UR STRIP: 7 [PH] (ref 4.5–8)
PLATELET # BLD AUTO: 143 X1000/UL (ref 130–400)
PMV BLD AUTO: 8.1 FL (ref 7.4–10.4)
PROT UR QL STRIP: NEGATIVE
RBC # BLD AUTO: 5.35 MILL/UL (ref 4.7–6.1)
SP GR UR STRIP: 1 (ref 1–1.03)
UROBILINOGEN UR STRIP-MCNC: 0.2 E.U./DL (ref 0.2–1)

## 2023-05-12 PROCEDURE — 83735 ASSAY OF MAGNESIUM: CPT

## 2023-05-12 PROCEDURE — 86140 C-REACTIVE PROTEIN: CPT

## 2023-05-12 PROCEDURE — 82570 ASSAY OF URINE CREATININE: CPT

## 2023-05-12 PROCEDURE — 82306 VITAMIN D 25 HYDROXY: CPT

## 2023-05-12 PROCEDURE — 84443 ASSAY THYROID STIM HORMONE: CPT

## 2023-05-12 PROCEDURE — 83615 LACTATE (LD) (LDH) ENZYME: CPT

## 2023-05-12 PROCEDURE — 83036 HEMOGLOBIN GLYCOSYLATED A1C: CPT

## 2023-05-12 PROCEDURE — 82728 ASSAY OF FERRITIN: CPT

## 2023-05-12 PROCEDURE — 85025 COMPLETE CBC W/AUTO DIFF WBC: CPT

## 2023-05-12 PROCEDURE — 80053 COMPREHEN METABOLIC PANEL: CPT

## 2023-05-12 PROCEDURE — 84550 ASSAY OF BLOOD/URIC ACID: CPT

## 2023-05-12 PROCEDURE — 80061 LIPID PANEL: CPT

## 2023-05-12 PROCEDURE — 83880 ASSAY OF NATRIURETIC PEPTIDE: CPT

## 2023-05-12 PROCEDURE — 36415 COLL VENOUS BLD VENIPUNCTURE: CPT

## 2023-05-12 PROCEDURE — 82043 UR ALBUMIN QUANTITATIVE: CPT

## 2023-05-12 PROCEDURE — 81003 URINALYSIS AUTO W/O SCOPE: CPT

## 2023-05-15 LAB
CREAT UR-MCNC: 9.2 MG/DL
MICROALBUMIN UR-MCNC: <3 UG/ML

## 2024-03-29 NOTE — NUR
HAND-OFF: 

Report given to DARA Tomlinson. Uneventful night. Pt rested comfortably. Minimal c/o pain this shift. Heparin drip therapeutic at 9mL/hr. Pt continues to progress towards meeting goals. VSS. Will continue to monitor.